# Patient Record
Sex: FEMALE | Race: WHITE | NOT HISPANIC OR LATINO | Employment: OTHER | URBAN - NONMETROPOLITAN AREA
[De-identification: names, ages, dates, MRNs, and addresses within clinical notes are randomized per-mention and may not be internally consistent; named-entity substitution may affect disease eponyms.]

---

## 2017-01-12 PROCEDURE — 88305 TISSUE EXAM BY PATHOLOGIST: CPT | Performed by: GENERAL PRACTICE

## 2017-01-13 ENCOUNTER — LAB REQUISITION (OUTPATIENT)
Dept: LAB | Facility: HOSPITAL | Age: 72
End: 2017-01-13

## 2017-01-13 DIAGNOSIS — Z00.00 ENCOUNTER FOR GENERAL ADULT MEDICAL EXAMINATION WITHOUT ABNORMAL FINDINGS: ICD-10-CM

## 2017-01-16 LAB
CYTO UR: NORMAL
LAB AP CASE REPORT: NORMAL
LAB AP CLINICAL INFORMATION: NORMAL
Lab: NORMAL
PATH REPORT.FINAL DX SPEC: NORMAL
PATH REPORT.GROSS SPEC: NORMAL

## 2018-01-23 ENCOUNTER — OFFICE VISIT (OUTPATIENT)
Dept: GASTROENTEROLOGY | Facility: CLINIC | Age: 73
End: 2018-01-23

## 2018-01-23 VITALS
WEIGHT: 202 LBS | HEART RATE: 82 BPM | DIASTOLIC BLOOD PRESSURE: 78 MMHG | BODY MASS INDEX: 33.66 KG/M2 | SYSTOLIC BLOOD PRESSURE: 172 MMHG | HEIGHT: 65 IN | OXYGEN SATURATION: 98 % | TEMPERATURE: 97.7 F

## 2018-01-23 DIAGNOSIS — Z86.010 HX OF COLONIC POLYP: Primary | ICD-10-CM

## 2018-01-23 PROCEDURE — S0260 H&P FOR SURGERY: HCPCS | Performed by: NURSE PRACTITIONER

## 2018-01-23 RX ORDER — OMEPRAZOLE 20 MG/1
20 CAPSULE, DELAYED RELEASE ORAL AS NEEDED
COMMUNITY

## 2018-01-23 RX ORDER — LOSARTAN POTASSIUM AND HYDROCHLOROTHIAZIDE 12.5; 5 MG/1; MG/1
TABLET ORAL
Refills: 3 | Status: ON HOLD | COMMUNITY
Start: 2017-10-17 | End: 2019-11-14

## 2018-01-23 RX ORDER — LORAZEPAM 0.5 MG/1
TABLET ORAL
Refills: 0 | Status: ON HOLD | COMMUNITY
Start: 2017-11-06 | End: 2019-11-14

## 2018-01-23 RX ORDER — INFLUENZA VACCINE, ADJUVANTED 15; 15; 15 UG/.5ML; UG/.5ML; UG/.5ML
INJECTION, SUSPENSION INTRAMUSCULAR
Refills: 0 | COMMUNITY
Start: 2017-10-17 | End: 2018-01-23

## 2018-01-23 RX ORDER — FERROUS SULFATE 325(65) MG
325 TABLET ORAL
COMMUNITY
End: 2021-08-19

## 2018-01-23 RX ORDER — ASPIRIN 81 MG/1
81 TABLET ORAL DAILY
COMMUNITY

## 2018-02-16 ENCOUNTER — TELEPHONE (OUTPATIENT)
Dept: FAMILY MEDICINE CLINIC | Facility: CLINIC | Age: 73
End: 2018-02-16

## 2018-02-16 NOTE — TELEPHONE ENCOUNTER
No she is not I did not even look in chart I just fig since kids were then she was ours I will have her call her dr for meds

## 2018-02-16 NOTE — TELEPHONE ENCOUNTER
Pt called she has 99.0 temp and just bad headache just she thinks she is gettingthe flu she asked for tamiflu wg

## 2019-03-19 ENCOUNTER — OFFICE VISIT (OUTPATIENT)
Dept: GASTROENTEROLOGY | Facility: CLINIC | Age: 74
End: 2019-03-19

## 2019-03-19 VITALS
SYSTOLIC BLOOD PRESSURE: 164 MMHG | WEIGHT: 205 LBS | TEMPERATURE: 97.8 F | BODY MASS INDEX: 34.16 KG/M2 | DIASTOLIC BLOOD PRESSURE: 78 MMHG | HEIGHT: 65 IN | OXYGEN SATURATION: 99 % | HEART RATE: 61 BPM

## 2019-03-19 DIAGNOSIS — Z86.010 HX OF COLONIC POLYP: Primary | ICD-10-CM

## 2019-03-19 DIAGNOSIS — I10 ESSENTIAL HYPERTENSION: ICD-10-CM

## 2019-03-19 PROCEDURE — S0260 H&P FOR SURGERY: HCPCS | Performed by: NURSE PRACTITIONER

## 2019-03-19 NOTE — PROGRESS NOTES
[facility]    Primary Physician Billy Leal MD    3/19/2019    Pascale Jimenez   1945      Chief Complaint   Patient presents with   • Colonoscopy       Subjective     HPI    Pascale Jimenez is a 74 y.o. female who presents as a referral for preventative maintenance. She has no complaints of nausea or vomiting. No change in bowels. No wt loss. No BRBPR. No melena. No abdominal pain.        Last colonoscopy was 3/2014 polyp ( polypoid appearing granulation tissue, no malignancy), sigmoid diverticulosis, .  The patient does have history of colon polyps. The patient does not have history of colon cancer.   There is no family history of colon polyps. There is no family history of colon cancer.     Past Medical History:   Diagnosis Date   • Colon polyp    • Diverticulitis    • Duodenal ulcer    • GERD (gastroesophageal reflux disease)    • Hemorrhoids    • Histoplasmosis    • Hyperlipidemia    • Hypertension        Past Surgical History:   Procedure Laterality Date   • APPENDECTOMY     • CHOLECYSTECTOMY     • COLONOSCOPY  03/25/2014   • ENDOSCOPY  07/14/2015   • HYSTERECTOMY     • REPLACEMENT TOTAL KNEE     • SPINE SURGERY     • TOTAL HIP ARTHROPLASTY Bilateral     x 2   • TUBAL ABDOMINAL LIGATION         Outpatient Medications Marked as Taking for the 3/19/19 encounter (Office Visit) with Namrata Quiros APRN   Medication Sig Dispense Refill   • aspirin 81 MG EC tablet Take 81 mg by mouth Daily.     • ferrous sulfate 325 (65 FE) MG tablet Take 325 mg by mouth Daily With Breakfast.     • FLUTICASONE PROPIONATE, INHAL, IN Inhale Daily.     • LORazepam (ATIVAN) 0.5 MG tablet TK 1 T PO TID PRA. NO DRIVING IF DROWSY PRN  0   • losartan-hydrochlorothiazide (HYZAAR) 50-12.5 MG per tablet TK 1 T PO D  3   • omeprazole (priLOSEC) 20 MG capsule Take 20 mg by mouth Daily.         Allergies   Allergen Reactions   • Demerol [Meperidine] Nausea And Vomiting   • Morphine And Related Nausea  And Vomiting   • Prednisone Other (See Comments)     Pt does not recall   • Stadol [Butorphanol] Other (See Comments)     Pt does not recall   • Talwin [Pentazocine] Mental Status Change   • Adhesive Tape Rash       Social History     Socioeconomic History   • Marital status:      Spouse name: Not on file   • Number of children: Not on file   • Years of education: Not on file   • Highest education level: Not on file   Social Needs   • Financial resource strain: Not on file   • Food insecurity - worry: Not on file   • Food insecurity - inability: Not on file   • Transportation needs - medical: Not on file   • Transportation needs - non-medical: Not on file   Occupational History   • Not on file   Tobacco Use   • Smoking status: Former Smoker   • Smokeless tobacco: Never Used   Substance and Sexual Activity   • Alcohol use: No   • Drug use: No   • Sexual activity: Defer   Other Topics Concern   • Not on file   Social History Narrative   • Not on file       Family History   Problem Relation Age of Onset   • Colon cancer Neg Hx    • Colon polyps Neg Hx        Review of Systems   Constitutional: Negative for chills, fever and unexpected weight change.   Respiratory: Negative for cough, shortness of breath and wheezing.    Cardiovascular: Negative for chest pain and palpitations.   Gastrointestinal: Negative for abdominal distention, abdominal pain, anal bleeding, blood in stool, constipation, diarrhea, nausea, rectal pain and vomiting.       Objective     Vitals:    03/19/19 1419   BP: 164/78   Pulse: 61   Temp: 97.8 °F (36.6 °C)   SpO2: 99%         03/19/19  1419   Weight: 93 kg (205 lb)     Body mass index is 34.64 kg/m².    Physical Exam   Constitutional: She appears well-developed and well-nourished. No distress.   Cardiovascular: Normal rate, regular rhythm and normal heart sounds.   Pulmonary/Chest: Effort normal and breath sounds normal.   Abdominal: Soft. Bowel sounds are normal. She exhibits no  distension. There is no tenderness.   Musculoskeletal: She exhibits no edema.   Neurological: She is alert.   Skin: Skin is warm and dry.   Psychiatric: She has a normal mood and affect. Her behavior is normal.   Vitals reviewed.      Imaging Results (most recent)     None          Assessment/Plan     Pascale was seen today for colonoscopy.    Diagnoses and all orders for this visit:    Hx of colonic polyp  -     External Facility Surgical/Procedural Request; Future    Essential hypertension    Other orders  -     polyethylene glycol (GOLYTELY) 236 g solution; Take 4,000 mL by mouth 1 (One) Time for 1 dose. Take as directed per instruction sheet.    Plan for colonoscopy.  The patient was advised to take any blood pressure or heart  medications the morning of  procedure if that is when he/she normally takes.             Body mass index is 34.64 kg/m².    Patient's Body mass index is 34.64 kg/m². BMI is above normal parameters. Recommendations include: no follow up, recommend weight loss. .      * Surgery not found *  All risks, benefits, alternatives, and indications of colonoscopy procedure have been discussed with the patient. Risks to include perforation of the colon requiring possible surgery or colostomy, risk of bleeding from biopsies or removal of colon tissue, possibility of missing a colon polyp or cancer, or adverse drug reaction.  Benefits to include the diagnosis and management of disease of the colon and rectum. Alternatives to include barium enema, radiographic evaluation, lab testing or no intervention. Pt verbalizes understanding and agrees.       JUAN Persaud      EMR Dragon/transcription disclaimer:  Much of this encounter note is electronic transcription/translation of spoken language to printed text.  The electronic translation of spoken language may be erroneous, or at times, nonsensical words or phrases may be inadvertently transcribed.  Although I have reviewed the note for such errors,  some may still exist.

## 2019-04-15 ENCOUNTER — TELEPHONE (OUTPATIENT)
Dept: INTERNAL MEDICINE | Facility: CLINIC | Age: 74
End: 2019-04-15

## 2019-04-15 NOTE — TELEPHONE ENCOUNTER
Pt never seen Emma Deimm or anyone else in our clinic, called pt to confirm and she said that it is indeed a mistake. Wrong pt.

## 2019-04-15 NOTE — TELEPHONE ENCOUNTER
PATIENT CALLED IN WANTING TO REFILL A RX FOR HER EYE. SHE STATED SHE WAS ATTACKED BY A [employer]IG WITH LIME DISEASE, SHE STATED SHE HAS A BULLS EYES AND HAS BEEN SEEN FOR THIS BEFORE. CALL WAS LOST

## 2019-04-17 ENCOUNTER — TELEPHONE (OUTPATIENT)
Dept: GASTROENTEROLOGY | Facility: CLINIC | Age: 74
End: 2019-04-17

## 2019-04-17 NOTE — TELEPHONE ENCOUNTER
PT called and would like to do the mag citrate instead of the Golytely.  Pt said she discussed it with you.

## 2019-04-23 ENCOUNTER — OUTSIDE FACILITY SERVICE (OUTPATIENT)
Dept: GASTROENTEROLOGY | Facility: CLINIC | Age: 74
End: 2019-04-23

## 2019-04-23 PROCEDURE — 45378 DIAGNOSTIC COLONOSCOPY: CPT | Performed by: INTERNAL MEDICINE

## 2019-04-30 PROCEDURE — G0123 SCREEN CERV/VAG THIN LAYER: HCPCS | Performed by: GENERAL PRACTICE

## 2019-05-02 ENCOUNTER — LAB REQUISITION (OUTPATIENT)
Dept: LAB | Facility: HOSPITAL | Age: 74
End: 2019-05-02

## 2019-05-02 DIAGNOSIS — Z01.419 ENCOUNTER FOR GYNECOLOGICAL EXAMINATION WITHOUT ABNORMAL FINDING: ICD-10-CM

## 2019-05-02 LAB
GEN CATEG CVX/VAG CYTO-IMP: NORMAL
LAB AP CASE REPORT: NORMAL
LAB AP GYN ADDITIONAL INFORMATION: NORMAL
LAB AP GYN OTHER FINDINGS: NORMAL
PATH INTERP SPEC-IMP: NORMAL
STAT OF ADQ CVX/VAG CYTO-IMP: NORMAL

## 2019-11-13 ENCOUNTER — HOSPITAL ENCOUNTER (OUTPATIENT)
Facility: HOSPITAL | Age: 74
Setting detail: OBSERVATION
Discharge: HOME | End: 2019-11-15
Attending: NEUROLOGICAL SURGERY | Admitting: NEUROLOGICAL SURGERY

## 2019-11-13 DIAGNOSIS — R26.89 DECREASED MOBILITY: ICD-10-CM

## 2019-11-13 DIAGNOSIS — S22.000A COMPRESSION FRACTURE OF BODY OF THORACIC VERTEBRA (HCC): Primary | ICD-10-CM

## 2019-11-13 LAB
ALBUMIN SERPL-MCNC: 4 G/DL (ref 3.5–5.2)
ALBUMIN/GLOB SERPL: 1.4 G/DL
ALP SERPL-CCNC: 90 U/L (ref 39–117)
ALT SERPL W P-5'-P-CCNC: 12 U/L (ref 1–33)
ANION GAP SERPL CALCULATED.3IONS-SCNC: 13 MMOL/L (ref 5–15)
APTT PPP: 31.1 SECONDS (ref 24.1–35)
AST SERPL-CCNC: 17 U/L (ref 1–32)
BASOPHILS # BLD AUTO: 0.04 10*3/MM3 (ref 0–0.2)
BASOPHILS NFR BLD AUTO: 0.4 % (ref 0–1.5)
BILIRUB SERPL-MCNC: 0.5 MG/DL (ref 0.2–1.2)
BUN BLD-MCNC: 17 MG/DL (ref 8–23)
BUN/CREAT SERPL: 30.9 (ref 7–25)
CALCIUM SPEC-SCNC: 9.4 MG/DL (ref 8.6–10.5)
CHLORIDE SERPL-SCNC: 98 MMOL/L (ref 98–107)
CO2 SERPL-SCNC: 30 MMOL/L (ref 22–29)
CREAT BLD-MCNC: 0.55 MG/DL (ref 0.57–1)
DEPRECATED RDW RBC AUTO: 42.7 FL (ref 37–54)
EOSINOPHIL # BLD AUTO: 0.17 10*3/MM3 (ref 0–0.4)
EOSINOPHIL NFR BLD AUTO: 1.5 % (ref 0.3–6.2)
ERYTHROCYTE [DISTWIDTH] IN BLOOD BY AUTOMATED COUNT: 13.1 % (ref 12.3–15.4)
GFR SERPL CREATININE-BSD FRML MDRD: 108 ML/MIN/1.73
GLOBULIN UR ELPH-MCNC: 2.9 GM/DL
GLUCOSE BLD-MCNC: 154 MG/DL (ref 65–99)
HCT VFR BLD AUTO: 34.7 % (ref 34–46.6)
HGB BLD-MCNC: 11.8 G/DL (ref 12–15.9)
IMM GRANULOCYTES # BLD AUTO: 0.06 10*3/MM3 (ref 0–0.05)
IMM GRANULOCYTES NFR BLD AUTO: 0.5 % (ref 0–0.5)
INR PPP: 1 (ref 0.91–1.09)
LYMPHOCYTES # BLD AUTO: 1.56 10*3/MM3 (ref 0.7–3.1)
LYMPHOCYTES NFR BLD AUTO: 13.8 % (ref 19.6–45.3)
MCH RBC QN AUTO: 30.6 PG (ref 26.6–33)
MCHC RBC AUTO-ENTMCNC: 34 G/DL (ref 31.5–35.7)
MCV RBC AUTO: 90.1 FL (ref 79–97)
MONOCYTES # BLD AUTO: 0.74 10*3/MM3 (ref 0.1–0.9)
MONOCYTES NFR BLD AUTO: 6.6 % (ref 5–12)
NEUTROPHILS # BLD AUTO: 8.72 10*3/MM3 (ref 1.7–7)
NEUTROPHILS NFR BLD AUTO: 77.2 % (ref 42.7–76)
NRBC BLD AUTO-RTO: 0 /100 WBC (ref 0–0.2)
PLATELET # BLD AUTO: 344 10*3/MM3 (ref 140–450)
PMV BLD AUTO: 9.7 FL (ref 6–12)
POTASSIUM BLD-SCNC: 3.9 MMOL/L (ref 3.5–5.2)
PROT SERPL-MCNC: 6.9 G/DL (ref 6–8.5)
PROTHROMBIN TIME: 13.5 SECONDS (ref 11.9–14.6)
RBC # BLD AUTO: 3.85 10*6/MM3 (ref 3.77–5.28)
SODIUM BLD-SCNC: 141 MMOL/L (ref 136–145)
WBC NRBC COR # BLD: 11.29 10*3/MM3 (ref 3.4–10.8)

## 2019-11-13 PROCEDURE — 96374 THER/PROPH/DIAG INJ IV PUSH: CPT

## 2019-11-13 PROCEDURE — G0378 HOSPITAL OBSERVATION PER HR: HCPCS

## 2019-11-13 PROCEDURE — 80053 COMPREHEN METABOLIC PANEL: CPT | Performed by: NEUROLOGICAL SURGERY

## 2019-11-13 PROCEDURE — 99219 PR INITIAL OBSERVATION CARE/DAY 50 MINUTES: CPT | Performed by: NEUROLOGICAL SURGERY

## 2019-11-13 PROCEDURE — 85730 THROMBOPLASTIN TIME PARTIAL: CPT | Performed by: NEUROLOGICAL SURGERY

## 2019-11-13 PROCEDURE — 85025 COMPLETE CBC W/AUTO DIFF WBC: CPT | Performed by: NEUROLOGICAL SURGERY

## 2019-11-13 PROCEDURE — 25010000002 KETOROLAC TROMETHAMINE PER 15 MG: Performed by: NEUROLOGICAL SURGERY

## 2019-11-13 PROCEDURE — 94799 UNLISTED PULMONARY SVC/PX: CPT

## 2019-11-13 PROCEDURE — 94760 N-INVAS EAR/PLS OXIMETRY 1: CPT

## 2019-11-13 PROCEDURE — 85610 PROTHROMBIN TIME: CPT | Performed by: NEUROLOGICAL SURGERY

## 2019-11-13 RX ORDER — UBIDECARENONE 75 MG
50 CAPSULE ORAL DAILY
COMMUNITY
End: 2021-01-18 | Stop reason: SDDI

## 2019-11-13 RX ORDER — FERROUS SULFATE 325(65) MG
325 TABLET ORAL
Status: DISCONTINUED | OUTPATIENT
Start: 2019-11-14 | End: 2019-11-15 | Stop reason: HOSPADM

## 2019-11-13 RX ORDER — LORAZEPAM 0.5 MG/1
0.5 TABLET ORAL EVERY 6 HOURS PRN
Status: DISCONTINUED | OUTPATIENT
Start: 2019-11-13 | End: 2019-11-15 | Stop reason: HOSPADM

## 2019-11-13 RX ORDER — PROMETHAZINE HYDROCHLORIDE 25 MG/ML
12.5 INJECTION, SOLUTION INTRAMUSCULAR; INTRAVENOUS EVERY 6 HOURS PRN
Status: DISCONTINUED | OUTPATIENT
Start: 2019-11-13 | End: 2019-11-15 | Stop reason: HOSPADM

## 2019-11-13 RX ORDER — PROMETHAZINE HYDROCHLORIDE 25 MG/1
12.5 TABLET ORAL EVERY 6 HOURS PRN
Status: DISCONTINUED | OUTPATIENT
Start: 2019-11-13 | End: 2019-11-15 | Stop reason: HOSPADM

## 2019-11-13 RX ORDER — SENNA AND DOCUSATE SODIUM 50; 8.6 MG/1; MG/1
2 TABLET, FILM COATED ORAL 2 TIMES DAILY PRN
Status: DISCONTINUED | OUTPATIENT
Start: 2019-11-13 | End: 2019-11-15 | Stop reason: HOSPADM

## 2019-11-13 RX ORDER — ASPIRIN 81 MG/1
81 TABLET ORAL DAILY
Status: DISCONTINUED | OUTPATIENT
Start: 2019-11-14 | End: 2019-11-15 | Stop reason: HOSPADM

## 2019-11-13 RX ORDER — BISACODYL 10 MG
10 SUPPOSITORY, RECTAL RECTAL DAILY PRN
Status: DISCONTINUED | OUTPATIENT
Start: 2019-11-13 | End: 2019-11-15 | Stop reason: HOSPADM

## 2019-11-13 RX ORDER — PANTOPRAZOLE SODIUM 40 MG/1
40 TABLET, DELAYED RELEASE ORAL
Status: DISCONTINUED | OUTPATIENT
Start: 2019-11-14 | End: 2019-11-15 | Stop reason: HOSPADM

## 2019-11-13 RX ORDER — PROMETHAZINE HYDROCHLORIDE 12.5 MG/1
12.5 SUPPOSITORY RECTAL EVERY 6 HOURS PRN
Status: DISCONTINUED | OUTPATIENT
Start: 2019-11-13 | End: 2019-11-15 | Stop reason: HOSPADM

## 2019-11-13 RX ORDER — SODIUM CHLORIDE 0.9 % (FLUSH) 0.9 %
10 SYRINGE (ML) INJECTION EVERY 12 HOURS SCHEDULED
Status: DISCONTINUED | OUTPATIENT
Start: 2019-11-13 | End: 2019-11-15 | Stop reason: HOSPADM

## 2019-11-13 RX ORDER — SODIUM CHLORIDE 0.9 % (FLUSH) 0.9 %
10 SYRINGE (ML) INJECTION AS NEEDED
Status: DISCONTINUED | OUTPATIENT
Start: 2019-11-13 | End: 2019-11-15 | Stop reason: HOSPADM

## 2019-11-13 RX ORDER — KETOROLAC TROMETHAMINE 30 MG/ML
30 INJECTION, SOLUTION INTRAMUSCULAR; INTRAVENOUS EVERY 6 HOURS PRN
Status: DISCONTINUED | OUTPATIENT
Start: 2019-11-13 | End: 2019-11-15 | Stop reason: HOSPADM

## 2019-11-13 RX ADMIN — SODIUM CHLORIDE, PRESERVATIVE FREE 10 ML: 5 INJECTION INTRAVENOUS at 23:08

## 2019-11-13 RX ADMIN — KETOROLAC TROMETHAMINE 30 MG: 30 INJECTION, SOLUTION INTRAMUSCULAR; INTRAVENOUS at 23:07

## 2019-11-13 RX ADMIN — LORAZEPAM 0.5 MG: 0.5 TABLET ORAL at 23:07

## 2019-11-14 ENCOUNTER — APPOINTMENT (OUTPATIENT)
Dept: MRI IMAGING | Facility: HOSPITAL | Age: 74
End: 2019-11-14

## 2019-11-14 ENCOUNTER — APPOINTMENT (OUTPATIENT)
Dept: CT IMAGING | Facility: HOSPITAL | Age: 74
End: 2019-11-14

## 2019-11-14 PROCEDURE — 25010000002 KETOROLAC TROMETHAMINE PER 15 MG: Performed by: NEUROLOGICAL SURGERY

## 2019-11-14 PROCEDURE — 96376 TX/PRO/DX INJ SAME DRUG ADON: CPT

## 2019-11-14 PROCEDURE — 94799 UNLISTED PULMONARY SVC/PX: CPT

## 2019-11-14 PROCEDURE — 72146 MRI CHEST SPINE W/O DYE: CPT

## 2019-11-14 PROCEDURE — 99225 PR SBSQ OBSERVATION CARE/DAY 25 MINUTES: CPT | Performed by: NURSE PRACTITIONER

## 2019-11-14 PROCEDURE — G0378 HOSPITAL OBSERVATION PER HR: HCPCS

## 2019-11-14 PROCEDURE — 94760 N-INVAS EAR/PLS OXIMETRY 1: CPT

## 2019-11-14 PROCEDURE — 71250 CT THORAX DX C-: CPT

## 2019-11-14 PROCEDURE — 97161 PT EVAL LOW COMPLEX 20 MIN: CPT

## 2019-11-14 PROCEDURE — 97165 OT EVAL LOW COMPLEX 30 MIN: CPT

## 2019-11-14 RX ORDER — HYDROCHLOROTHIAZIDE 12.5 MG/1
12.5 TABLET ORAL DAILY
COMMUNITY

## 2019-11-14 RX ORDER — LOSARTAN POTASSIUM 100 MG/1
100 TABLET ORAL DAILY
COMMUNITY

## 2019-11-14 RX ADMIN — LORAZEPAM 0.5 MG: 0.5 TABLET ORAL at 21:58

## 2019-11-14 RX ADMIN — PANTOPRAZOLE SODIUM 40 MG: 40 TABLET, DELAYED RELEASE ORAL at 05:12

## 2019-11-14 RX ADMIN — KETOROLAC TROMETHAMINE 30 MG: 30 INJECTION, SOLUTION INTRAMUSCULAR; INTRAVENOUS at 21:58

## 2019-11-14 RX ADMIN — SODIUM CHLORIDE, PRESERVATIVE FREE 10 ML: 5 INJECTION INTRAVENOUS at 10:11

## 2019-11-14 RX ADMIN — LORAZEPAM 0.5 MG: 0.5 TABLET ORAL at 16:03

## 2019-11-14 NOTE — THERAPY EVALUATION
Patient Name: Pascale Jimenez  : 1945    MRN: [id number]                              Today's Date: 2019       Admit Date: 2019    Visit Dx:     ICD-10-CM ICD-9-CM   1. Decreased mobility R26.89 781.99     Patient Active Problem List   Diagnosis   • Compression fracture of body of thoracic vertebra (CMS/HCC)     Past Medical History:   Diagnosis Date   • Colon polyp    • Diverticulitis    • Duodenal ulcer    • GERD (gastroesophageal reflux disease)    • Hemorrhoids    • Histoplasmosis    • Hyperlipidemia    • Hypertension      Past Surgical History:   Procedure Laterality Date   • APPENDECTOMY     • CHOLECYSTECTOMY     • COLONOSCOPY  2014   • ENDOSCOPY  2015   • HYSTERECTOMY     • REPLACEMENT TOTAL KNEE     • SPINE SURGERY     • TOTAL HIP ARTHROPLASTY Bilateral     x 2   • TUBAL ABDOMINAL LIGATION       General Information     Row Name 19 1128          PT Evaluation Time/Intention    Document Type  evaluation pt presents with possible acute T7 compression Fx, pt reports L paraspinal back pain and midthoracic pain  -MS (r) DE (t) MS (c)     Mode of Treatment  physical therapy  -MS (r) DE (t) MS (c)     Row Name 19 1128          General Information    Patient Profile Reviewed?  yes  -MS (r) DE (t) MS (c)     Prior Level of Function  independent:;all household mobility;community mobility;gait;transfer;ADL's;grooming;feeding;cooking;home management;dressing;bathing  -MS (r) DE (t) MS (c)     Existing Precautions/Restrictions  fall;spinal  -MS (r) DE (t) MS (c)     Barriers to Rehab  none identified  -MS (r) DE (t) MS (c)     Row Name 19 1128          Relationship/Environment    Lives With  grandchild(jeanie)  -MS (r) DE (t) MS (c)     Name(s) of Who Lives With Patient  cares for 2 grandchildren  -MS (r) DE (t) MS (c)     Row Name 19 1128          Resource/Environmental Concerns    Current Living Arrangements  home/apartment/condo  -MS (r) DE (t) MS (c)     Row Name  11/14/19 1128          Home Main Entrance    Number of Stairs, Main Entrance  eight  -MS (r) DE (t) MS (c)     Stair Railings, Main Entrance  railings on both sides of stairs  -MS (r) DE (t) MS (c)     Row Name 11/14/19 1128          Cognitive Assessment/Intervention- PT/OT    Orientation Status (Cognition)  oriented x 4  -MS (r) DE (t) MS (c)     Row Name 11/14/19 1128          Safety Issues, Functional Mobility    Impairments Affecting Function (Mobility)  pain  -MS (r) DE (t) MS (c)       User Key  (r) = Recorded By, (t) = Taken By, (c) = Cosigned By    Initials Name Provider Type    Marilu Loza, PT, DPT, NCS Physical Therapist    Abiodun Milan, PT Student PT Student        Mobility     Row Name 11/14/19 1130          Bed Mobility Assessment/Treatment    Bed Mobility Assessment/Treatment  supine-sit;scooting/bridging;sit-supine  -MS (r) DE (t) MS (c)     Scooting/Bridging [place] (Bed Mobility)  conditional independence  -MS (r) DE (t) MS (c)     Supine-Sit [place] (Bed Mobility)  conditional independence  -MS (r) DE (t) MS (c)     Sit-Supine [place] (Bed Mobility)  conditional independence  -MS (r) DE (t) MS (c)     Assistive Device (Bed Mobility)  head of bed elevated  -MS (r) DE (t) MS (c)     Row Name 11/14/19 1130          Sit-Stand Transfer    Sit-Stand [place] (Transfers)  independent  -MS (r) DE (t) MS (c)     Row Name 11/14/19 1130          Gait/Stairs Assessment/Training    Gait/Stairs Assessment/Training  gait/ambulation independence  -MS (r) DE (t) MS (c)     [place] Level (Gait)  independent  -MS (r) DE (t) MS (c)     Distance in Feet (Gait)  200 feet.  -MS (r) DE (t) MS (c)     Negotiation (Stairs)  stairs independence  -MS (r) DE (t) MS (c)     [place] Level (Stairs)  independent  -MS (r) DE (t) MS (c)     Handrail Location (Stairs)  both sides  -MS (r) DE (t) MS (c)     Number of Steps (Stairs)  5 stairs   -MS (r) DE (t) MS (c)       User Key  (r) =  Recorded By, (t) = Taken By, (c) = Cosigned By    Initials Name Provider Type    Marilu Loza R, PT, DPT, NCS Physical Therapist    Abiodun Milan, PT Student PT Student        Obj/Interventions     Row Name 11/14/19 1132          General ROM    GENERAL ROM COMMENTS  BUE and BLE WNL  -MS (r) DE (t) MS (c)     Row Name 11/14/19 1132          MMT (Manual Muscle Testing)    General MMT Comments  BUE and BLE WNL  -MS (r) DE (t) MS (c)     Row Name 11/14/19 1132          Static Sitting Balance    Level of Coffee (Unsupported Sitting, Static Balance)  independent  -MS (r) DE (t) MS (c)     Sitting Position (Unsupported Sitting, Static Balance)  sitting on edge of bed  -MS (r) DE (t) MS (c)     Row Name 11/14/19 1132          Dynamic Sitting Balance    Level of [place], Reaches Outside Midline (Sitting, Dynamic Balance)  independent  -MS (r) DE (t) MS (c)     Sitting Position, Reaches Outside Midline (Sitting, Dynamic Balance)  sitting on edge of bed  -MS (r) DE (t) MS (c)     Row Name 11/14/19 1132          Static Standing Balance    Level of Coffee (Supported Standing, Static Balance)  independent  -MS (r) DE (t) MS (c)     Row Name 11/14/19 1132          Dynamic Standing Balance    Level of Coffee, Reaches Outside Midline (Standing, Dynamic Balance)  supervision  -MS (r) DE (t) MS (c)     Row Name 11/14/19 1132          Sensory Assessment/Intervention    Sensory General Assessment  no sensation deficits identified  -MS (r) DE (t) MS (c)       User Key  (r) = Recorded By, (t) = Taken By, (c) = Cosigned By    Initials Name Provider Type    Marilu Loaz R, PT, DPT, NCS Physical Therapist    Abiodun Milan, PT Student PT Student        Goals/Plan    No documentation.       Clinical Impression     Row Name 11/14/19 1133          Pain Assessment    Additional Documentation  Pain Scale: Numbers Pre/Post-Treatment (Group)  -MS (r) DE (t) MS (c)     Row Name 11/14/19 1133          Pain  Scale: Numbers Pre/Post-Treatment    Pain Scale: Numbers, Pretreatment  2/10  -MS (r) DE (t) MS (c)     Pain Location - Side  Left  -MS (r) DE (t) MS (c)     Pain Location - Orientation  posterior  -MS (r) DE (t) MS (c)     Pain Location  back  -MS (r) DE (t) MS (c)     Pre/Post Treatment Pain Comment  pt also states her buttocks are a little sore from fall.  -MS (r) DE (t) MS (c)     Pain Intervention(s)  Ambulation/increased activity;Repositioned  -MS (r) DE (t) MS (c)     Row Name 11/14/19 1133          Plan of Care Review    Plan of Care Reviewed With  patient  -MS (r) DE (t) MS (c)     Row Name 11/14/19 1133          Physical Therapy Clinical Impression    Patient/Family Goals Statement (PT Clinical Impression)  to be able to return home safely  -MS (r) DE (t) MS (c)     Criteria for Skilled Interventions Met (PT Clinical Impression)  no;current level of function same as previous level of function;no problems identified which require skilled intervention  -MS (r) DE (t) MS (c)     Row Name 11/14/19 1133          Positioning and Restraints    Pre-Treatment Position  in bed  -MS (r) DE (t) MS (c)     Post Treatment Position  bed  -MS (r) DE (t) MS (c)     In Bed  fowlers;encouraged to call for assist;call light within reach;side rails up x2;SCD pump applied  -MS (r) DE (t) MS (c)       User Key  (r) = Recorded By, (t) = Taken By, (c) = Cosigned By    Initials Name Provider Type    Marilu Loza, PT, DPT, NCS Physical Therapist    Abiodun Milan, PT Student PT Student        Outcome Measures     Row Name 11/14/19 4769          How much help from another person do you currently need...    Turning from your back to your side while in flat bed without using bedrails?  4  -MS (r) DE (t) MS (c)     Moving from lying on back to sitting on the side of a flat bed without bedrails?  4  -MS (r) DE (t) MS (c)     Moving to and from a bed to a chair (including a wheelchair)?  4  -MS (r) DE (t) MS (c)     Standing up  "from a chair using your arms (e.g., wheelchair, bedside chair)?  4  -MS (r) DE (t) MS (c)     Climbing 3-5 steps with a railing?  4  -MS (r) DE (t) MS (c)     To walk in hospital room?  4  -MS (r) DE (t) MS (c)     AM-PAC 6 Clicks Score (PT)  24  -MS (r) DE (t)     Row Name 11/14/19 1135          Functional Assessment    Outcome Measure Options  AM-PAC 6 Clicks Basic Mobility (PT)  -MS (r) DE (t) MS (c)       User Key  (r) = Recorded By, (t) = Taken By, (c) = Cosigned By    Initials Name Provider Type    Marilu Loza FATOU, PT, DPT, NCS Physical Therapist    Abiodun Milan, PT Student PT Student        Physical Therapy Education     Title: PT OT SLP Therapies (Done)     Topic: Physical Therapy (Done)     Point: Body mechanics (Done)     Learning Progress Summary           Patient BELLA Manuel COURTNEY CHRISTIANSON by DE at 11/14/2019 11:36 AM    Comment:  pt educated on current precautions and strategies to adhere to them. pt given tips for proper body mechanics during functional task                   Point: Precautions (Done)     Learning Progress Summary           Patient BELLA Manuel VU, DU by DE at 11/14/2019 11:36 AM    Comment:  pt educated on current precautions and strategies to adhere to them. pt given tips for proper body mechanics during functional task                               User Key     Initials Effective Dates Name Provider Type Discipline    DE 10/10/19 -  Abiodun Tolentino, PT Student PT Student PT              PT Recommendation and Plan     Outcome Summary/Treatment Plan (PT)  Anticipated Discharge Disposition (PT): home with assist  Plan of Care Reviewed With: patient  Progress: improving  Outcome Summary: PT eval completed on this date. pt was A&O x4 and very cooperative. pt reported a minor amount of pain in her midback. pt states she has been getting around fine the past 3 days just has had a little pain and she came to the hospital to get answers on what injury she obtained and make sure \"she wouldnt be " "paralyzed\" from it. pt moved in and out of bed, performed functional transfers, ambulated approx 200 feet and used 5 stairs, all with independence. other than noted pain, pt is functioning at her baseline therefore PT intervention is not indicated at this time. Anticipated D/C to home with assist from family, when medically ready.        Time Calculation:   PT Charges     Row Name 11/14/19 1127             Time Calculation    Start Time  1108 chart review [id number]  -MS (r) DE (t) MS (c)      Stop Time  1126  -MS (r) DE (t) MS (c)      Time Calculation (min)  18 min  -MS (r) DE (t)      PT Received On  11/14/19  -MS (r) DE (t) MS (c)      PT Goal Re-Cert Due Date  --  -MS (r) DE (t) MS (c)        User Key  (r) = Recorded By, (t) = Taken By, (c) = Cosigned By    Initials Name Provider Type    MS Salbador Marilu R, PT, DPT, NCS Physical Therapist    Abiodun Milan, PT Student PT Student        Therapy Charges for Today     Code Description Service Date Service Provider Modifiers Qty    [id number] HC PT EVAL LOW COMPLEXITY 2 11/14/2019 Abiodun Tolentino, PT Student GP 1          PT G-Codes  Outcome Measure Options: AM-PAC 6 Clicks Basic Mobility (PT)  AM-PAC 6 Clicks Score (PT): 24    Abiodun Tolentino PT Student  11/14/2019       "

## 2019-11-14 NOTE — PLAN OF CARE
"Problem: Patient Care Overview  Goal: Plan of Care Review   11/14/19 7239   Coping/Psychosocial   Plan of Care Reviewed With patient   Plan of Care Review   Progress improving   OTHER   Outcome Summary PT annabelle completed on this date. pt was A&O x4 and very cooperative. pt reported a minor amount of pain in her midback. pt states she has been getting around fine the past 3 days just has had a little pain and she came to the hospital to get answers on what injury she obtained and make sure \"she wouldnt be paralyzed\" from it. pt moved in and out of bed, performed functional transfers, ambulated approx 200 feet and used 5 stairs, all with independence. other than noted pain, pt is functioning at her baseline therefore PT intervention is not indicated at this time. Anticipated D/C to home with assist from family, when medically ready.          "

## 2019-11-14 NOTE — THERAPY DISCHARGE NOTE
Acute Care - Occupational Therapy Initial Eval/Discharge  [facility]     Patient Name: Pascale Jimenez  : 1945  MRN: [id number]  Today's Date: 2019  Onset of Illness/Injury or Date of Surgery: 19  Date of Referral to OT: 19  Referring Physician: Dr. Argueta      Admit Date: 2019       ICD-10-CM ICD-9-CM   1. Decreased mobility R26.89 781.99     Patient Active Problem List   Diagnosis   • Compression fracture of body of thoracic vertebra (CMS/HCC)     Past Medical History:   Diagnosis Date   • Colon polyp    • Diverticulitis    • Duodenal ulcer    • GERD (gastroesophageal reflux disease)    • Hemorrhoids    • Histoplasmosis    • Hyperlipidemia    • Hypertension      Past Surgical History:   Procedure Laterality Date   • APPENDECTOMY     • CHOLECYSTECTOMY     • COLONOSCOPY  2014   • ENDOSCOPY  2015   • HYSTERECTOMY     • REPLACEMENT TOTAL KNEE     • SPINE SURGERY     • TOTAL HIP ARTHROPLASTY Bilateral     x 2   • TUBAL ABDOMINAL LIGATION            OT ASSESSMENT FLOWSHEET (last 12 hours)      Occupational Therapy Evaluation     Row Name 19 1112                   OT Evaluation Time/Intention    Subjective Information  complains of;pain  -CS        Document Type  evaluation  -CS        Mode of Treatment  occupational therapy  -CS           General Information    Patient Profile Reviewed?  yes  -CS        Onset of Illness/Injury or Date of Surgery  19  -CS        Referring Physician  Dr. Argueta  -CS        Patient Observations  alert;cooperative;agree to therapy  -CS        Patient/Family Observations  no family present  -CS        General Observations of Patient  Pt up in bathroom independently  -CS        Prior Level of Function  independent:;all household mobility;community mobility;ADL's;dressing;bathing  -CS        Equipment Currently Used at Home  none  -CS        Pertinent History of Current Functional Problem  Pt presents with back pain after slipping on ice  on Monday 11/10.  Pt is experiencing L paraspinal back pain.  DX: T7 compression fx  -CS        Existing Precautions/Restrictions  fall;spinal  -CS        Risks Reviewed  patient:;LOB;nausea/vomiting;dizziness;increased discomfort  -CS        Benefits Reviewed  patient:;improve function;increase independence;increase strength;increase balance;decrease pain  -CS        Barriers to Rehab  none identified  -CS           Relationship/Environment    Lives With  grandchild(jeanie) dependent  -CS           Resource/Environmental Concerns    Current Living Arrangements  home/apartment/condo  -CS           Home Main Entrance    Number of Stairs, Main Entrance  eight  -CS        Stair Railings, Main Entrance  railings on both sides of stairs  -CS           Cognitive Assessment/Interventions    Additional Documentation  Cognitive Assessment/Intervention (Group)  -CS           Cognitive Assessment/Intervention- PT/OT    Affect/Mental Status (Cognitive)  WNL  -CS        Orientation Status (Cognition)  oriented x 4  -CS        Follows Commands (Cognition)  WNL  -CS        Cognitive Function (Cognitive)  WNL  -CS        Personal Safety Interventions  fall prevention program maintained;gait belt;nonskid shoes/slippers when out of bed;supervised activity  -CS           Safety Issues, Functional Mobility    Impairments Affecting Function (Mobility)  pain  -CS           Bed Mobility Assessment/Treatment    Bed Mobility Assessment/Treatment  sit-supine;scooting/bridging  -CS        Scooting/Bridging Kay (Bed Mobility)  independent  -CS        Sit-Supine Kay (Bed Mobility)  independent  -CS        Assistive Device (Bed Mobility)  head of bed elevated  -CS           Functional Mobility    Functional Mobility- Ind. Level  independent  -CS        Functional Mobility- Comment  Pt walked entire length of hallway  -CS           Transfer Assessment/Treatment    Transfer Assessment/Treatment  sit-stand transfer;stand-sit transfer   -CS           Sit-Stand Transfer    Sit-Stand [place] (Transfers)  independent  -CS           Stand-Sit Transfer    Stand-Sit [place] (Transfers)  independent  -CS           ADL Assessment/Intervention    BADL Assessment/Intervention  bathing;upper body dressing;lower body dressing  -CS           Bathing Assessment/Intervention    Comment (Bathing)  Pt completed independently prior to OT eval  -CS           Upper Body Dressing Assessment/Training    Comment (Upper Body Dressing)  Pt completed independently prior to OT eval  -CS           Lower Body Dressing Assessment/Training    Comment (Lower Body Dressing)  Pt completed independently prior to OT eval  -CS           General ROM    GENERAL ROM COMMENTS  BUE AROM WFL  -CS           MMT (Manual Muscle Testing)    General MMT Comments  BUE strength: 5/5  -CS           Sensory Assessment/Intervention    Sensory General Assessment  no sensation deficits identified  -CS           Positioning and Restraints    Pre-Treatment Position  bathroom  -CS        Post Treatment Position  bed  -CS        In Bed  fowlers;call light within reach;encouraged to call for assist  -CS           Pain Assessment    Additional Documentation  Pain Scale: Numbers Pre/Post-Treatment (Group)  -CS           Pain Scale: Numbers Pre/Post-Treatment    Pain Scale: Numbers, Pretreatment  2/10  -CS        Pain Location - Side  Bilateral  -CS        Pain Location - Orientation  posterior  -CS        Pain Location  back  -CS        Pain Intervention(s)  Medication (See MAR);Repositioned;Ambulation/increased activity  -CS           Plan of Care Review    Plan of Care Reviewed With  patient  -CS           Clinical Impression (OT)    Date of Referral to OT  11/13/19  -CS        OT Diagnosis  Impaired ADLs and funcitonal mobility  -CS        Patient/Family Goals Statement (OT Eval)  to go home  -CS        Criteria for Skilled Therapeutic Interventions Met (OT Eval)  no;no problems identified which  require skilled intervention  -CS        Therapy Frequency (OT Eval)  evaluation only  -CS        Care Plan Review (OT)  evaluation/treatment results reviewed  -CS        Anticipated Discharge Disposition (OT)  home with assist  -CS           Living Environment    Home Accessibility  stairs to enter home  -CS          User Key  (r) = Recorded By, (t) = Taken By, (c) = Cosigned By    Initials Name Effective Dates    Kesha Quinones OTR/LGONZALO 04/02/19 -               OT Recommendation and Plan  Outcome Summary/Treatment Plan (OT)  Anticipated Discharge Disposition (OT): home with assist  Therapy Frequency (OT Eval): evaluation only  Plan of Care Review  Plan of Care Reviewed With: patient  Plan of Care Reviewed With: patient  Outcome Summary: OT evaluation completed.  Pt demo no further need for skilled OT services, as pt is at her baseline.  Pt completed full shower and dressing task prior to therapy evaluation independently.  Pt is at her baseline with ADLs and completed all functional mobility with mod I.  OT will sign off at this time with recommendation for pt to discharge home with assist.         Outcome Measures     Row Name 11/14/19 1112             How much help from another is currently needed...    Putting on and taking off regular lower body clothing?  4  -CS      Bathing (including washing, rinsing, and drying)  4  -CS      Toileting (which includes using toilet bed pan or urinal)  4  -CS      Putting on and taking off regular upper body clothing  4  -CS      Taking care of personal grooming (such as brushing teeth)  4  -CS      Eating meals  4  -CS      AM-PAC 6 Clicks Score (OT)  24  -CS         Functional Assessment    Outcome Measure Options  AM-PAC 6 Clicks Daily Activity (OT)  -CS        User Key  (r) = Recorded By, (t) = Taken By, (c) = Cosigned By    Initials Name Provider Type    Kesha Quinones, OTR/L, GONZALO Occupational Therapist          Time Calculation:   Time Calculation- OT      Row Name 11/14/19 1346 11/14/19 1345          Time Calculation- OT    OT Start Time  1112  -CS  0740  -CS     OT Stop Time  1134  -CS  0800  -CS     OT Time Calculation (min)  22 min  -CS  20 min  -CS     OT Received On  11/14/19  -CS  11/14/19  -CS       User Key  (r) = Recorded By, (t) = Taken By, (c) = Cosigned By    Initials Name Provider Type    CS Kesha Knutson OTR/L, GONZALO Occupational Therapist        Therapy Suggested Charges     Code   Minutes Charges    None           Therapy Charges for Today     Code Description Service Date Service Provider Modifiers Qty    [id number] HC OT EVAL LOW COMPLEXITY 3 11/14/2019 Kesah Knutson OTR/L, CNT GO 1               OT Discharge Summary  Anticipated Discharge Disposition (OT): home with assist  Reason for Discharge: At baseline function, Independent  Outcomes Achieved: Other(1x OT evaluation)  Discharge Destination: Home with assist    MARYCRUZ Guardado/L, CNT  11/14/2019

## 2019-11-14 NOTE — PROGRESS NOTES
"Pascale Jimenez  74 y.o.      Chief complaint:   Thoracic back pain.  CT scan of the chest did not show a rib fracture.  There is concern that the T7 compression deformity may be acute.  Pain controlled with IV Toradol    Subjective:  Symptoms:  Stable.    Diet:  NPO.    Activity level: Impaired due to pain.    Pain:  She complains of pain that is moderate.  She reports pain is improving.  Pain is partially controlled.      No events    Temp:  [97.6 °F (36.4 °C)-98.4 °F (36.9 °C)] 98.4 °F (36.9 °C)  Heart Rate:  [75-80] 80  Resp:  [18-19] 18  BP: (156-189)/(50-75) 156/50      Objective:  General Appearance:  Well-appearing, comfortable, in no acute distress and in pain.    Vital signs: (most recent): Blood pressure 156/50, pulse 80, temperature 98.4 °F (36.9 °C), temperature source Oral, resp. rate 18, height 167.6 cm (66\"), weight 96.2 kg (212 lb 0 oz), SpO2 96 %.  Vital signs are normal.  No fever.    Output: Producing urine.    HEENT: Normal HEENT exam.    Lungs:  Normal effort and normal respiratory rate.  She is not in respiratory distress.    Heart: Normal rate.  Regular rhythm.    Chest: Symmetric chest wall expansion.   Extremities: Normal range of motion.    Skin:  Warm and dry.    Abdomen: Bowel sounds are normal.   There is no abdominal tenderness.     Pulses: Distal pulses are intact.        Neurologic Exam     Mental Status   Oriented to person, place, and time.   Attention: normal. Concentration: normal.   Speech: speech is normal   Level of consciousness: alert  Normal comprehension.     Cranial Nerves   Cranial nerves II through XII intact.     Motor Exam   Muscle bulk: normal    Strength   Strength 5/5 throughout.     Sensory Exam   Light touch normal.         Compression fracture of body of thoracic vertebra (CMS/HCC)      Lab Results (last 24 hours)     Procedure Component Value Units Date/Time    Comprehensive Metabolic Panel [[id number]]  (Abnormal) Collected:  11/13/19 2206    Specimen:  Blood " Updated:  11/13/19 2338     Glucose 154 mg/dL      BUN 17 mg/dL      Creatinine 0.55 mg/dL      Sodium 141 mmol/L      Potassium 3.9 mmol/L      Chloride 98 mmol/L      CO2 30.0 mmol/L      Calcium 9.4 mg/dL      Total Protein 6.9 g/dL      Albumin 4.00 g/dL      ALT (SGPT) 12 U/L      AST (SGOT) 17 U/L      Alkaline Phosphatase 90 U/L      Total Bilirubin 0.5 mg/dL      eGFR Non African Amer 108 mL/min/1.73      Globulin 2.9 gm/dL      A/G Ratio 1.4 g/dL      BUN/Creatinine Ratio 30.9     Anion Gap 13.0 mmol/L     Narrative:       GFR Normal >60  Chronic Kidney Disease <60  Kidney Failure <15    aPTT [[id number]]  (Normal) Collected:  11/13/19 2314    Specimen:  Blood Updated:  11/13/19 2332     PTT 31.1 seconds     Protime-INR [[id number]]  (Normal) Collected:  11/13/19 2314    Specimen:  Blood Updated:  11/13/19 2332     Protime 13.5 Seconds      INR 1.00    CBC Auto Differential [[id number]]  (Abnormal) Collected:  11/13/19 2314    Specimen:  Blood Updated:  11/13/19 2322     WBC 11.29 10*3/mm3      RBC 3.85 10*6/mm3      Hemoglobin 11.8 g/dL      Hematocrit 34.7 %      MCV 90.1 fL      MCH 30.6 pg      MCHC 34.0 g/dL      RDW 13.1 %      RDW-SD 42.7 fl      MPV 9.7 fL      Platelets 344 10*3/mm3      Neutrophil % 77.2 %      Lymphocyte % 13.8 %      Monocyte % 6.6 %      Eosinophil % 1.5 %      Basophil % 0.4 %      Immature Grans % 0.5 %      Neutrophils, Absolute 8.72 10*3/mm3      Lymphocytes, Absolute 1.56 10*3/mm3      Monocytes, Absolute 0.74 10*3/mm3      Eosinophils, Absolute 0.17 10*3/mm3      Basophils, Absolute 0.04 10*3/mm3      Immature Grans, Absolute 0.06 10*3/mm3      nRBC 0.0 /100 WBC               Plan:   CV: Heart rate and blood pressure stable  Pulmonary: Oxygen level stable   GI: N.p.o.  : Adequate urine output  Neuro: Exam stable  MRI of the thoracic spine to be done today to check the acuity of fracture.  Continue with pain control.  PT to see and evaluate today    Car Mejia,  APRN

## 2019-11-14 NOTE — H&P
Date of Admission: 11/13/2019  Primary Care Physician: Billy Leal MD        Subjective:    Chief complaint back pain    HPI: 74-year-old female who slipped on some ice on Monday.  She landed backwards on her buttocks.  She has had left paraspinal back pain and midthoracic area ever since.  Is worse when she lies on her left side.  She denies any lower extremity pain numbness or tingling.  She went to an outside emergency room and had a CT scan which was curious for a T7 compression fracture. she was transferred here for neurosurgical evaluation    Review of Systems   Musculoskeletal: Positive for back pain.   All other systems reviewed and are negative.       Past Medical History:   Past Medical History:   Diagnosis Date   • Colon polyp    • Diverticulitis    • Duodenal ulcer    • GERD (gastroesophageal reflux disease)    • Hemorrhoids    • Histoplasmosis    • Hyperlipidemia    • Hypertension        Past Surgical History:   Past Surgical History:   Procedure Laterality Date   • APPENDECTOMY     • CHOLECYSTECTOMY     • COLONOSCOPY  03/25/2014   • ENDOSCOPY  07/14/2015   • HYSTERECTOMY     • REPLACEMENT TOTAL KNEE     • SPINE SURGERY     • TOTAL HIP ARTHROPLASTY Bilateral     x 2   • TUBAL ABDOMINAL LIGATION         Family History: family history is not on file.    Social History:  reports that she has quit smoking. She has never used smokeless tobacco. She reports that she does not drink alcohol or use drugs.    Code Status: full    Medications:  Medications Prior to Admission   Medication Sig Dispense Refill Last Dose   • aspirin 81 MG EC tablet Take 81 mg by mouth Daily.   Taking   • ferrous sulfate 325 (65 FE) MG tablet Take 325 mg by mouth Daily With Breakfast.   Taking   • FLUTICASONE PROPIONATE, INHAL, IN Inhale Daily.   Taking   • LORazepam (ATIVAN) 0.5 MG tablet TK 1 T PO TID PRA. NO DRIVING IF DROWSY PRN  0 Taking   • losartan-hydrochlorothiazide (HYZAAR) 50-12.5 MG per tablet TK 1 T PO D  3  Taking   • omeprazole (priLOSEC) 20 MG capsule Take 20 mg by mouth Daily.   Taking   • vitamin B-12 (CYANOCOBALAMIN) 100 MCG tablet Take 50 mcg by mouth Daily.          Allergies:  Allergies   Allergen Reactions   • Demerol [Meperidine] Nausea And Vomiting   • Morphine And Related Nausea And Vomiting   • Prednisone Other (See Comments)     Pt does not recall   • Stadol [Butorphanol] Other (See Comments)     Pt does not recall   • Talwin [Pentazocine] Mental Status Change   • Adhesive Tape Rash       Objective:  Physical Exam   Constitutional: She is oriented to person, place, and time. She appears well-developed and well-nourished.   Cardiovascular: Normal rate and regular rhythm.   Pulmonary/Chest: Effort normal. No stridor. No respiratory distress.   Abdominal: Soft. She exhibits no distension.   Neurological: She is oriented to person, place, and time. She has normal strength. She has a normal Finger-Nose-Finger Test. Gait normal.   Psychiatric: Her speech is normal.       Neurologic Exam     Mental Status   Oriented to person, place, and time.   Attention: normal.   Speech: speech is normal   Level of consciousness: alert  Knowledge: good.     Cranial Nerves   Cranial nerves II through XII intact.     Motor Exam   Muscle bulk: normal  Overall muscle tone: normal  Right arm pronator drift: absent  Left arm pronator drift: absent    Strength   Strength 5/5 throughout.     Sensory Exam   Light touch normal.   Pinprick normal.     Gait, Coordination, and Reflexes     Gait  Gait: normal    Coordination   Finger to nose coordination: normal    Tremor   Resting tremor: absent  Intention tremor: absent  Action tremor: absent    Reflexes   Reflexes 2+ except as noted.       Vital Signs  Temp:  [97.8 °F (36.6 °C)] 97.8 °F (36.6 °C)  Heart Rate:  [75] 75  Resp:  [19] 19  BP: (189)/(75) 189/75        Results Review:  I reviewed the patient's new clinical results.  I reviewed the patient's new imaging results and agree with  the interpretation.  I reviewed the patient's other test results and agree with the interpretation         Lab Results (last 24 hours)     ** No results found for the last 24 hours. **          Imaging Results (Last 24 Hours)     ** No results found for the last 24 hours. **             Active Hospital Problems    Diagnosis   • Compression fracture of body of thoracic vertebra (CMS/HCC)       Assessment/Plan:   CT scan of the thoracic and lumbar spine shows multilevel degenerative changes throughout.  There is the suggestion of a superior endplate fracture of T7.  The patient is complaining of paraspinal leftward back pain at about T7.  We are getting an MRI of the thoracic spine tomorrow and a CT scan of the chest without contrast to assess for her rib fractures and whether there is any acute compression deformity of T7.    I discussed the patients findings and my recommendations with patient, family and nursing staff    Artemio Argueta MD  11/13/19  10:52 PM    Time: More than 50% of time spent in counseling and coordination of care:  Total face-to-face/floor time 60 min.  Time spent in counseling 30 min. Counseling included the following topics: Diagnosis, condition, treatment, plan

## 2019-11-14 NOTE — PLAN OF CARE
Problem: Patient Care Overview  Goal: Plan of Care Review  Outcome: Ongoing (interventions implemented as appropriate)   11/14/19 3722   Coping/Psychosocial   Plan of Care Reviewed With patient   Plan of Care Review   Progress improving   OTHER   Outcome Summary OT evaluation completed. Pt demo no further need for skilled OT services, as pt is at her baseline. Pt completed full shower and dressing task prior to therapy evaluation independently. Pt is at her baseline with ADLs and completed all functional mobility with mod I. OT will sign off at this time with recommendation for pt to discharge home with assist.

## 2019-11-14 NOTE — PLAN OF CARE
Problem: Patient Care Overview  Goal: Plan of Care Review  Outcome: Ongoing (interventions implemented as appropriate)   11/14/19 0335   Coping/Psychosocial   Plan of Care Reviewed With patient;family   Plan of Care Review   Progress no change   OTHER   Outcome Summary Pt came to floor as a DA from [facility]. Pt was very nervous and anxious upon to arrival to the floor with a SBP of 189. Gave pt Ativan and Toradol; pt has been resting well throughout the night. MRI and CT of chest scheduled for 11/14. MRI checklist complete. Cont to monitor VS. Bed alarm set.        Problem: Fall Risk (Adult)  Goal: Identify Related Risk Factors and Signs and Symptoms  Outcome: Ongoing (interventions implemented as appropriate)    Goal: Absence of Fall  Outcome: Ongoing (interventions implemented as appropriate)      Problem: Pain, Chronic (Adult)  Goal: Identify Related Risk Factors and Signs and Symptoms  Outcome: Ongoing (interventions implemented as appropriate)    Goal: Acceptable Pain/Comfort Level and Functional Ability  Outcome: Ongoing (interventions implemented as appropriate)      Problem: Anxiety (Adult)  Goal: Identify Related Risk Factors and Signs and Symptoms  Outcome: Ongoing (interventions implemented as appropriate)    Goal: Reduction/Resolution  Outcome: Ongoing (interventions implemented as appropriate)      Problem: Fracture Orthopaedic (Adult)  Goal: Signs and Symptoms of Listed Potential Problems Will be Absent, Minimized or Managed (Fracture Orthopaedic)  Outcome: Ongoing (interventions implemented as appropriate)      Problem: Pain, Acute (Adult)  Goal: Identify Related Risk Factors and Signs and Symptoms  Outcome: Ongoing (interventions implemented as appropriate)    Goal: Acceptable Pain Control/Comfort Level  Outcome: Ongoing (interventions implemented as appropriate)

## 2019-11-14 NOTE — PROGRESS NOTES
Discharge Planning Assessment   [facility]     Patient Name: Pascale Jimenez  MRN: [id number]  Today's Date: 11/14/2019    Admit Date: 11/13/2019    Discharge Needs Assessment     Row Name 11/14/19 1056       Living Environment    Lives With  other (see comments)    Name(s) of Who Lives With Patient  cares for 2 grandkids    Current Living Arrangements  home/apartment/condo    Primary Care Provided by  self    Provides Primary Care For  no one    Family Caregiver if Needed  child(jeanie), adult    Quality of Family Relationships  helpful;involved;supportive    Able to Return to Prior Arrangements  yes       Resource/Environmental Concerns    Resource/Environmental Concerns  none    Transportation Concerns  car, none       Transition Planning    Patient/Family Anticipates Transition to  home    Patient/Family Anticipated Services at Transition  none    Transportation Anticipated  family or friend will provide       Discharge Needs Assessment    Readmission Within the Last 30 Days  no previous admission in last 30 days    Concerns to be Addressed  no discharge needs identified;denies needs/concerns at this time    Equipment Currently Used at Home  cane, straight;shower chair;commode;walker, standard;walker, rolling    Anticipated Changes Related to Illness  none    Equipment Needed After Discharge  none    Discharge Coordination/Progress  Pt has RX coverage and a PCP. Pt plans on discharging home to care for her 2 grandchildren when medically stable. Pt states that children are in the care of other grandparents at this time. Pt denies any needs or concerns at this time. SW will follow and assist with any other discharge needs that may arise.         Discharge Plan    No documentation.       Destination      No service coordination in this encounter.      Durable Medical Equipment      No service coordination in this encounter.      Dialysis/Infusion      No service coordination in this encounter.      Home Medical Care       No service coordination in this encounter.      Therapy      No service coordination in this encounter.      Community Resources      No service coordination in this encounter.          Demographic Summary    No documentation.       Functional Status    No documentation.       Psychosocial    No documentation.       Abuse/Neglect    No documentation.       Legal    No documentation.       Substance Abuse    No documentation.       Patient Forms    No documentation.           Kaitlynn Dey

## 2019-11-15 VITALS
RESPIRATION RATE: 16 BRPM | DIASTOLIC BLOOD PRESSURE: 69 MMHG | WEIGHT: 212 LBS | HEIGHT: 66 IN | TEMPERATURE: 97.8 F | HEART RATE: 96 BPM | BODY MASS INDEX: 34.07 KG/M2 | OXYGEN SATURATION: 96 % | SYSTOLIC BLOOD PRESSURE: 154 MMHG

## 2019-11-15 PROCEDURE — 99217 PR OBSERVATION CARE DISCHARGE MANAGEMENT: CPT | Performed by: NURSE PRACTITIONER

## 2019-11-15 PROCEDURE — G0378 HOSPITAL OBSERVATION PER HR: HCPCS

## 2019-11-15 PROCEDURE — 94799 UNLISTED PULMONARY SVC/PX: CPT

## 2019-11-15 RX ORDER — ACETAMINOPHEN AND CODEINE PHOSPHATE 300; 30 MG/1; MG/1
1 TABLET ORAL EVERY 8 HOURS PRN
Qty: 90 TABLET | Refills: 0 | Status: SHIPPED | OUTPATIENT
Start: 2019-11-15 | End: 2021-01-19

## 2019-11-15 RX ADMIN — FERROUS SULFATE TAB 325 MG (65 MG ELEMENTAL FE) 325 MG: 325 (65 FE) TAB at 10:15

## 2019-11-15 RX ADMIN — ASPIRIN 81 MG: 81 TABLET ORAL at 10:15

## 2019-11-15 RX ADMIN — PANTOPRAZOLE SODIUM 40 MG: 40 TABLET, DELAYED RELEASE ORAL at 06:04

## 2019-11-15 RX ADMIN — BUDESONIDE 1 PUFF: 180 AEROSOL, POWDER RESPIRATORY (INHALATION) at 06:36

## 2019-11-15 RX ADMIN — LOSARTAN POTASSIUM: 50 TABLET, FILM COATED ORAL at 10:15

## 2019-11-15 NOTE — PROGRESS NOTES
Continued Stay Note  [facility]     Patient Name: Pascale Jimenez  MRN: [id number]  Today's Date: 11/15/2019    Admit Date: 11/13/2019    Discharge Plan     Row Name 11/15/19 1044       Plan    Final Discharge Disposition Code  01 - home or self-care    Final Note  PT IS BEING DCD HOME TODAY WITH NO DC NEEDS. PT HAS DME AT HOME.         Discharge Codes    No documentation.       Expected Discharge Date and Time     Expected Discharge Date Expected Discharge Time    Nov 15, 2019             FLORENCE Porras

## 2019-11-15 NOTE — DISCHARGE SUMMARY
Date of Discharge:  11/15/2019    Discharge Diagnosis: Thoracic pain, T7 compression deformity    Presenting Problem/History of Present Illness  Compression fracture of body of thoracic vertebra (CMS/HCC) [S22.000A]  Compression fracture of body of thoracic vertebra (CMS/HCC) [S22.000A]       Hospital Course      This is a 74-year-old female patient who slipped and fell on ice on November 11, 2019.  She landed backwards on her buttocks and had left paraspinal pain and pain in her midthoracic region.  Pain did get to the point where she had to go to the emergency room.  She was not complaining of pain in her legs or numbness and tingling.  She says the pain in her back is constant.  She has CT scan at the outside facility which did show concern for T7 compression deformity.  She was transferred to [facility] for neurosurgical evaluation.  An MRI was done which did show an acute T7 compression deformity.  Patient has been managing this with anti-inflammatory medication.  She says the pain is still present but it is tolerable at this time.  She thinks that she would like to go home and try to treat this conservatively for bracing.  We will order a TLSO brace for the patient.  We will also order a shower chair for her.  We will follow-up again with her in 2 weeks.  I will give her an order for Tylenol 3 to help with the pain.  She was told to call our office if she had any worsening pain.  She is not to engage in any strenuous activity including bending lifting or twisting    Procedures Performed         Consults:   Consults     No orders found from 10/15/2019 to 11/14/2019.          Pertinent Test Results: radiology: MRI: Showed a T7 compression deformity that is acute.  There is no retropulsion or concern for instability.  No cord signal change    Condition on Discharge: Stable    Vital Signs  Temp:  [97.5 °F (36.4 °C)-98.3 °F (36.8 °C)] 97.8 °F (36.6 °C)  Heart Rate:  [73-98] 96  Resp:  [16-18] 16  BP:  (154-174)/(54-78) 154/69    Physical Exam:   Physical Exam   Constitutional: She is oriented to person, place, and time. She appears well-developed and well-nourished.   HENT:   Head: Normocephalic.   Eyes: Conjunctivae, EOM and lids are normal. Pupils are equal, round, and reactive to light.   Neck: Normal range of motion.   Cardiovascular: Normal rate, regular rhythm and normal heart sounds.   Pulmonary/Chest: Effort normal and breath sounds normal.   Abdominal: Normal appearance.   Musculoskeletal: Normal range of motion.        Thoracic back: She exhibits pain.   Neurological: She is alert and oriented to person, place, and time. She has normal strength and normal reflexes. She displays normal reflexes. No cranial nerve deficit or sensory deficit. GCS eye subscore is 4. GCS verbal subscore is 5. GCS motor subscore is 6.   Skin: Skin is warm.   Psychiatric: She has a normal mood and affect. Her speech is normal and behavior is normal. Thought content normal. Cognition and memory are normal.        Neurologic Exam     Mental Status   Oriented to person, place, and time.   Attention: normal. Concentration: normal.   Speech: speech is normal   Level of consciousness: alert  Normal comprehension.     Cranial Nerves   Cranial nerves II through XII intact.     CN III, IV, VI   Pupils are equal, round, and reactive to light.  Extraocular motions are normal.     Motor Exam   Muscle bulk: normal    Strength   Strength 5/5 throughout.     Sensory Exam   Light touch normal.          Discharge Disposition  Home or Self Care    Discharge Medications     Discharge Medications      New Medications      Instructions Start Date   acetaminophen-codeine 300-30 MG per tablet  Commonly known as:  TYLENOL #3   1 tablet, Oral, Every 8 Hours PRN         Continue These Medications      Instructions Start Date   aspirin 81 MG EC tablet   81 mg, Oral, Daily      ferrous sulfate 325 (65 FE) MG tablet   325 mg, Oral, Daily With Breakfast       hydroCHLOROthiazide 12.5 MG tablet  Commonly known as:  HYDRODIURIL   12.5 mg, Oral, Daily      losartan 100 MG tablet  Commonly known as:  COZAAR   100 mg, Oral, Daily      omeprazole 20 MG capsule  Commonly known as:  priLOSEC   20 mg, Oral, 2 Times Daily      vitamin B-12 100 MCG tablet  Commonly known as:  CYANOCOBALAMIN   50 mcg, Oral, Daily             Discharge Diet:   Diet Instructions     Diet: Regular; Thin      Discharge Diet:  Regular    Fluid Consistency:  Thin          Activity at Discharge:   Activity Instructions     Other Instructions (Specify)      Activity Instructions: no strenuous activity. Wear brace when out of bed or sitting up          Follow-up Appointments  No future appointments.  Additional Instructions for the Follow-ups that You Need to Schedule     Call MD With Problems / Concerns   As directed      Call with worsening back or leg pain, drainage from wound, fever, or difficulty walking    Order Comments:  Call with worsening back or leg pain, drainage from wound, fever, or difficulty walking          Discharge Follow-up with Specialty: gurdeep mejia np; 2 Weeks   As directed      Specialty:  gurdeep mejia np    Follow Up:  2 Weeks    Follow Up Details:  follow up on a day when Dr Argueta is in the office               Test Results Pending at Discharge       Gurdeep Mejia, JUAN  11/15/19  8:28 AM    Time: Discharge 31 min

## 2019-11-15 NOTE — PLAN OF CARE
Problem: Patient Care Overview  Goal: Plan of Care Review  Outcome: Ongoing (interventions implemented as appropriate)   11/15/19 0303   Coping/Psychosocial   Plan of Care Reviewed With patient   Plan of Care Review   Progress improving   OTHER   Outcome Summary A&Ox4. C/o back pain and anxious; controlled with IV Toradol and PO Ativan. Pt ambuating steady. Anxious to go home. Pt plans to hire someone to help her in the home to care for her grandkids. D/c SCDs per pt request; called Ellie for OK. VSS.        Problem: Fall Risk (Adult)  Goal: Identify Related Risk Factors and Signs and Symptoms  Outcome: Outcome(s) achieved Date Met: 11/15/19    Goal: Absence of Fall  Outcome: Outcome(s) achieved Date Met: 11/15/19      Problem: Pain, Chronic (Adult)  Goal: Identify Related Risk Factors and Signs and Symptoms  Outcome: Outcome(s) achieved Date Met: 11/15/19    Goal: Acceptable Pain/Comfort Level and Functional Ability  Outcome: Outcome(s) achieved Date Met: 11/15/19      Problem: Anxiety (Adult)  Goal: Identify Related Risk Factors and Signs and Symptoms  Outcome: Ongoing (interventions implemented as appropriate)    Goal: Reduction/Resolution  Outcome: Outcome(s) achieved Date Met: 11/15/19      Problem: Fracture Orthopaedic (Adult)  Goal: Signs and Symptoms of Listed Potential Problems Will be Absent, Minimized or Managed (Fracture Orthopaedic)  Outcome: Ongoing (interventions implemented as appropriate)      Problem: Pain, Acute (Adult)  Goal: Identify Related Risk Factors and Signs and Symptoms  Outcome: Ongoing (interventions implemented as appropriate)    Goal: Acceptable Pain Control/Comfort Level  Outcome: Ongoing (interventions implemented as appropriate)

## 2019-11-27 ENCOUNTER — HOSPITAL ENCOUNTER (OUTPATIENT)
Dept: GENERAL RADIOLOGY | Facility: HOSPITAL | Age: 74
Discharge: HOME | End: 2019-11-27
Admitting: NURSE PRACTITIONER

## 2019-11-27 ENCOUNTER — TELEPHONE (OUTPATIENT)
Dept: NEUROSURGERY | Facility: CLINIC | Age: 74
End: 2019-11-27

## 2019-11-27 ENCOUNTER — OFFICE VISIT (OUTPATIENT)
Dept: NEUROSURGERY | Facility: CLINIC | Age: 74
End: 2019-11-27

## 2019-11-27 VITALS
SYSTOLIC BLOOD PRESSURE: 120 MMHG | WEIGHT: 212.5 LBS | HEIGHT: 65 IN | BODY MASS INDEX: 35.4 KG/M2 | DIASTOLIC BLOOD PRESSURE: 70 MMHG

## 2019-11-27 DIAGNOSIS — M54.50 ACUTE BILATERAL LOW BACK PAIN WITHOUT SCIATICA: ICD-10-CM

## 2019-11-27 DIAGNOSIS — S22.000A COMPRESSION FRACTURE OF BODY OF THORACIC VERTEBRA (HCC): ICD-10-CM

## 2019-11-27 DIAGNOSIS — Z87.891 FORMER SMOKER: ICD-10-CM

## 2019-11-27 DIAGNOSIS — S22.000A COMPRESSION FRACTURE OF BODY OF THORACIC VERTEBRA (HCC): Primary | ICD-10-CM

## 2019-11-27 PROCEDURE — 72070 X-RAY EXAM THORAC SPINE 2VWS: CPT

## 2019-11-27 PROCEDURE — 99213 OFFICE O/P EST LOW 20 MIN: CPT | Performed by: NURSE PRACTITIONER

## 2019-11-27 PROCEDURE — 72100 X-RAY EXAM L-S SPINE 2/3 VWS: CPT

## 2019-11-27 RX ORDER — FLUTICASONE PROPIONATE 50 MCG
SPRAY, SUSPENSION (ML) NASAL
Refills: 3 | COMMUNITY
Start: 2019-08-29

## 2019-11-27 NOTE — TELEPHONE ENCOUNTER
Patient went for x-rays after her appointment today, Car reviewed them and patient was called with the results, x-rays did not show new fracture and her Thoracic fracture was stable, she is scheduled for her MRI on 12/04/19,

## 2019-11-27 NOTE — PROGRESS NOTES
"    Chief complaint:   Chief Complaint   Patient presents with   • Thoracic compression fracture     Pascale is returning today with complaint of severe pain, she had an appointment for next week but called the office and felt she should be seen sooner because of her pain level.  She is in her brace for her compression fracture but isn't sure if it is fitted correctly.         Subjective     HPI: This is a 74-year-old female patient who slipped and fell on the ice on November 11, 2019.  She did sustain a T7 compression deformity.  She was improving and had her pain at a tolerable level when she left the hospital.  She was in a TLSO brace.  She comes in today for further evaluation the patient states that she continues to have pain in her back down more so in the lumbar region on both the left and right sides.  She is also complaining of pain up around her bra area which is consistent with a T7 compression deformity.  There is never any imaging done of her low back as the majority of her complaint was in the thoracic region.  Denies any lower extremity pain or numbness and tingling.  Denies any bowel or bladder incontinence.  She remains in the brace.  Rates her pain on a scale of 0-10 at a 8.  She says it can interfere with actives of daily living.  She does take over-the-counter medicine as well as Tylenol with codeine to help with the pain    Review of Systems   Musculoskeletal: Positive for back pain.   Neurological: Negative.          Objective      Vital Signs  /70 (BP Location: Right arm, Patient Position: Sitting)   Ht 165.1 cm (65\")   Wt 96.4 kg (212 lb 8 oz)   BMI 35.36 kg/m²     Physical Exam   Constitutional: She is oriented to person, place, and time. She appears well-developed and well-nourished.   HENT:   Head: Normocephalic.   Eyes: EOM are normal. Pupils are equal, round, and reactive to light.   Neck: Normal range of motion.   Pulmonary/Chest: Effort normal.   Musculoskeletal: Normal range of " motion.        Thoracic back: She exhibits pain.        Lumbar back: She exhibits pain.   Neurological: She is alert and oriented to person, place, and time. She has normal strength and normal reflexes. No cranial nerve deficit or sensory deficit. Gait normal. GCS eye subscore is 4. GCS verbal subscore is 5. GCS motor subscore is 6.   Skin: Skin is warm.   Psychiatric: She has a normal mood and affect. Her speech is normal and behavior is normal. Thought content normal.       Results Review: No new imaging        Assessment/Plan: At this point I think it would be prudent to send the patient for x-rays of the thoracic and lumbar spine.  In addition of that I think it would also be of a benefit to send the patient for an MRI of the lumbar spine to see if any of the vertebrae that show an acute compression deformity.  We will follow-up with her once the imaging is completed.  BMI shows that she is overweight.  Be much chart was given to the patient.  She is a non-smoker      Pascale was seen today for thoracic compression fracture.    Diagnoses and all orders for this visit:    Compression fracture of body of thoracic vertebra (CMS/HCC)  -     XR Spine Thoracic 2 View; Future  -     MRI Lumbar Spine Without Contrast; Future    Acute bilateral low back pain without sciatica  -     XR Spine Lumbar AP & Lateral; Future  -     MRI Lumbar Spine Without Contrast; Future    BMI 35.0-35.9,adult    Former smoker        I discussed the patients findings and my recommendations with patient  Car Mejia, JUAN  11/27/19  12:43 PM

## 2019-11-27 NOTE — PATIENT INSTRUCTIONS

## 2019-12-04 ENCOUNTER — HOSPITAL ENCOUNTER (OUTPATIENT)
Dept: MRI IMAGING | Facility: HOSPITAL | Age: 74
Discharge: HOME | End: 2019-12-04
Admitting: NURSE PRACTITIONER

## 2019-12-04 DIAGNOSIS — S22.000A COMPRESSION FRACTURE OF BODY OF THORACIC VERTEBRA (HCC): ICD-10-CM

## 2019-12-04 DIAGNOSIS — M54.50 ACUTE BILATERAL LOW BACK PAIN WITHOUT SCIATICA: ICD-10-CM

## 2019-12-04 PROCEDURE — 72148 MRI LUMBAR SPINE W/O DYE: CPT

## 2019-12-11 ENCOUNTER — TELEPHONE (OUTPATIENT)
Dept: NEUROSURGERY | Facility: CLINIC | Age: 74
End: 2019-12-11

## 2019-12-11 NOTE — TELEPHONE ENCOUNTER
Pascale has called the office and left a message for a call back requesting an appointment back to the office in December, right now she is scheduled for January, but she wants to be seen before the end of the year, she also wants the results on her recent MRI.    I tried to return her call, no answer but I have left a message for a call back.

## 2019-12-13 ENCOUNTER — TELEPHONE (OUTPATIENT)
Dept: NEUROSURGERY | Facility: CLINIC | Age: 74
End: 2019-12-13

## 2019-12-13 DIAGNOSIS — S22.000A COMPRESSION FRACTURE OF BODY OF THORACIC VERTEBRA (HCC): Primary | ICD-10-CM

## 2019-12-13 NOTE — TELEPHONE ENCOUNTER
Patient called and given the results of her MRI of the lumbar spine.  She states her low back is not bothering as much as her mid back is at this time.  It is known that the patient does have a T7 compression deformity.  We will order a new set of x-rays of her thoracic spine and see her in follow-up next week

## 2019-12-16 ENCOUNTER — HOSPITAL ENCOUNTER (OUTPATIENT)
Dept: GENERAL RADIOLOGY | Facility: HOSPITAL | Age: 74
Discharge: HOME | End: 2019-12-16
Admitting: NURSE PRACTITIONER

## 2019-12-16 DIAGNOSIS — S22.000A COMPRESSION FRACTURE OF BODY OF THORACIC VERTEBRA (HCC): ICD-10-CM

## 2019-12-16 PROCEDURE — 72070 X-RAY EXAM THORAC SPINE 2VWS: CPT

## 2019-12-17 ENCOUNTER — OFFICE VISIT (OUTPATIENT)
Dept: NEUROSURGERY | Facility: CLINIC | Age: 74
End: 2019-12-17

## 2019-12-17 VITALS
BODY MASS INDEX: 35.32 KG/M2 | WEIGHT: 212 LBS | SYSTOLIC BLOOD PRESSURE: 140 MMHG | DIASTOLIC BLOOD PRESSURE: 90 MMHG | HEIGHT: 65 IN

## 2019-12-17 DIAGNOSIS — S22.000A COMPRESSION FRACTURE OF BODY OF THORACIC VERTEBRA (HCC): Primary | ICD-10-CM

## 2019-12-17 DIAGNOSIS — Z87.891 FORMER SMOKER: ICD-10-CM

## 2019-12-17 PROCEDURE — 99213 OFFICE O/P EST LOW 20 MIN: CPT | Performed by: NURSE PRACTITIONER

## 2019-12-17 NOTE — PATIENT INSTRUCTIONS

## 2019-12-17 NOTE — PROGRESS NOTES
"    Chief complaint:   Chief Complaint   Patient presents with   • Compression fracture     Pascale is returning for follow up for her pain from a compression fracture, she states her pain level is still at a level 6.         Subjective     HPI: This is a 74-year-old female patient who slipped and fell on the ice on November 11, 2019.  The patient did sustain a T7 compression deformity.  She states that since she has been in the hospital she does feel that she is about two thirds of the way better but still does have this nagging pain in her back right below her bra.  He says it is exacerbated by the fact that she does have a productive cough at this time which is causing her more pain.  She remains in the brace.  Denies any lower extremity pain or numbness and tingling.  Rates her pain on a scale of 0-10 is 6.  She says it does interfere with her activities of daily living.  Denies any bowel or bladder incontinence    Review of Systems   Musculoskeletal: Positive for back pain.   Neurological: Negative.          Objective      Vital Signs  /90 (BP Location: Right arm, Patient Position: Sitting)   Ht 165.1 cm (65\")   Wt 96.2 kg (212 lb)   BMI 35.28 kg/m²     Physical Exam   Constitutional: She is oriented to person, place, and time. She appears well-developed and well-nourished.   HENT:   Head: Normocephalic.   Eyes: Pupils are equal, round, and reactive to light. EOM are normal.   Neck: Normal range of motion.   Pulmonary/Chest: Effort normal.   Musculoskeletal: Normal range of motion.        Thoracic back: She exhibits pain.        Lumbar back: She exhibits pain.   Neurological: She is alert and oriented to person, place, and time. She has normal strength and normal reflexes. No cranial nerve deficit or sensory deficit. Gait normal. GCS eye subscore is 4. GCS verbal subscore is 5. GCS motor subscore is 6.   Skin: Skin is warm.   Psychiatric: She has a normal mood and affect. Her speech is normal and behavior " is normal. Thought content normal.       Results Review: X-rays of the thoracic spine show stable compression deformity of T7 without retropulsion        Assessment/Plan: At this point the patient is going to continue with conservative treatment to try and get over her compression deformity.  We are going to allow her to come out of the brace due to the fact that she feels like it is restricting her breathing and aggravating her with the cough.  We will follow-up again with her in 2 weeks to see how she is doing.  She was told to call us if she had any further problems.    Patient is a smoker  BMI shows the patient is Obese. BMI chart was given to the patient.     Pascale was seen today for compression fracture.    Diagnoses and all orders for this visit:    Compression fracture of body of thoracic vertebra (CMS/HCC)    Former smoker    BMI 35.0-35.9,adult        I discussed the patients findings and my recommendations with patient  Car Mejia, APRN  12/17/19  8:56 AM

## 2020-11-14 ENCOUNTER — HOSPITAL ENCOUNTER (OUTPATIENT)
Facility: HOSPITAL | Age: 75
Setting detail: OBSERVATION
Discharge: HOME HEALTH | End: 2020-11-16
Attending: FAMILY MEDICINE | Admitting: FAMILY MEDICINE

## 2020-11-14 ENCOUNTER — APPOINTMENT (OUTPATIENT)
Dept: MRI IMAGING | Facility: HOSPITAL | Age: 75
End: 2020-11-14

## 2020-11-14 ENCOUNTER — APPOINTMENT (OUTPATIENT)
Dept: NEUROLOGY | Facility: HOSPITAL | Age: 75
End: 2020-11-14

## 2020-11-14 ENCOUNTER — APPOINTMENT (OUTPATIENT)
Dept: CT IMAGING | Facility: HOSPITAL | Age: 75
End: 2020-11-14

## 2020-11-14 DIAGNOSIS — Z74.09 IMPAIRED MOBILITY: ICD-10-CM

## 2020-11-14 DIAGNOSIS — Z78.9 DECREASED ACTIVITIES OF DAILY LIVING (ADL): ICD-10-CM

## 2020-11-14 DIAGNOSIS — R47.9 SPEECH DISTURBANCE, UNSPECIFIED TYPE: ICD-10-CM

## 2020-11-14 DIAGNOSIS — R93.89 ABNORMAL MRI: ICD-10-CM

## 2020-11-14 DIAGNOSIS — R90.89 ABNORMAL FINDING ON MRI OF BRAIN: ICD-10-CM

## 2020-11-14 DIAGNOSIS — G47.34 NOCTURNAL HYPOXEMIA: ICD-10-CM

## 2020-11-14 DIAGNOSIS — R29.90 STROKE-LIKE SYMPTOMS: Primary | ICD-10-CM

## 2020-11-14 LAB
ALBUMIN SERPL-MCNC: 4.2 G/DL (ref 3.5–5.2)
ALBUMIN/GLOB SERPL: 1.4 G/DL
ALP SERPL-CCNC: 99 U/L (ref 39–117)
ALT SERPL W P-5'-P-CCNC: 18 U/L (ref 1–33)
AMPHET+METHAMPHET UR QL: NEGATIVE
AMPHETAMINES UR QL: NEGATIVE
ANION GAP SERPL CALCULATED.3IONS-SCNC: 9 MMOL/L (ref 5–15)
AST SERPL-CCNC: 20 U/L (ref 1–32)
BARBITURATES UR QL SCN: NEGATIVE
BASOPHILS # BLD AUTO: 0.03 10*3/MM3 (ref 0–0.2)
BASOPHILS NFR BLD AUTO: 0.4 % (ref 0–1.5)
BENZODIAZ UR QL SCN: NEGATIVE
BILIRUB SERPL-MCNC: 0.6 MG/DL (ref 0–1.2)
BUN SERPL-MCNC: 11 MG/DL (ref 8–23)
BUN/CREAT SERPL: 20.8 (ref 7–25)
BUPRENORPHINE SERPL-MCNC: NEGATIVE NG/ML
CALCIUM SPEC-SCNC: 9.4 MG/DL (ref 8.6–10.5)
CANNABINOIDS SERPL QL: NEGATIVE
CHLORIDE SERPL-SCNC: 102 MMOL/L (ref 98–107)
CO2 SERPL-SCNC: 30 MMOL/L (ref 22–29)
COCAINE UR QL: NEGATIVE
CREAT SERPL-MCNC: 0.53 MG/DL (ref 0.57–1)
DEPRECATED RDW RBC AUTO: 43 FL (ref 37–54)
EOSINOPHIL # BLD AUTO: 0.16 10*3/MM3 (ref 0–0.4)
EOSINOPHIL NFR BLD AUTO: 1.9 % (ref 0.3–6.2)
ERYTHROCYTE [DISTWIDTH] IN BLOOD BY AUTOMATED COUNT: 13.2 % (ref 12.3–15.4)
GFR SERPL CREATININE-BSD FRML MDRD: 113 ML/MIN/1.73
GLOBULIN UR ELPH-MCNC: 3.1 GM/DL
GLUCOSE SERPL-MCNC: 128 MG/DL (ref 65–99)
HCT VFR BLD AUTO: 37.2 % (ref 34–46.6)
HGB BLD-MCNC: 12.8 G/DL (ref 12–15.9)
HOLD SPECIMEN: NORMAL
HOLD SPECIMEN: NORMAL
IMM GRANULOCYTES # BLD AUTO: 0.03 10*3/MM3 (ref 0–0.05)
IMM GRANULOCYTES NFR BLD AUTO: 0.4 % (ref 0–0.5)
INR PPP: 0.99 (ref 0.91–1.09)
LYMPHOCYTES # BLD AUTO: 1.31 10*3/MM3 (ref 0.7–3.1)
LYMPHOCYTES NFR BLD AUTO: 15.3 % (ref 19.6–45.3)
MAGNESIUM SERPL-MCNC: 1.8 MG/DL (ref 1.6–2.4)
MCH RBC QN AUTO: 30.8 PG (ref 26.6–33)
MCHC RBC AUTO-ENTMCNC: 34.4 G/DL (ref 31.5–35.7)
MCV RBC AUTO: 89.4 FL (ref 79–97)
METHADONE UR QL SCN: NEGATIVE
MONOCYTES # BLD AUTO: 0.45 10*3/MM3 (ref 0.1–0.9)
MONOCYTES NFR BLD AUTO: 5.3 % (ref 5–12)
NEUTROPHILS NFR BLD AUTO: 6.56 10*3/MM3 (ref 1.7–7)
NEUTROPHILS NFR BLD AUTO: 76.7 % (ref 42.7–76)
NRBC BLD AUTO-RTO: 0 /100 WBC (ref 0–0.2)
OPIATES UR QL: NEGATIVE
OXYCODONE UR QL SCN: NEGATIVE
PCP UR QL SCN: NEGATIVE
PLATELET # BLD AUTO: 374 10*3/MM3 (ref 140–450)
PMV BLD AUTO: 9.5 FL (ref 6–12)
POTASSIUM SERPL-SCNC: 3.8 MMOL/L (ref 3.5–5.2)
PROPOXYPH UR QL: NEGATIVE
PROT SERPL-MCNC: 7.3 G/DL (ref 6–8.5)
PROTHROMBIN TIME: 12.7 SECONDS (ref 11.9–14.6)
RBC # BLD AUTO: 4.16 10*6/MM3 (ref 3.77–5.28)
SARS-COV-2 RNA PNL SPEC NAA+PROBE: NOT DETECTED
SODIUM SERPL-SCNC: 141 MMOL/L (ref 136–145)
TRICYCLICS UR QL SCN: NEGATIVE
TROPONIN T SERPL-MCNC: <0.01 NG/ML (ref 0–0.03)
WBC # BLD AUTO: 8.54 10*3/MM3 (ref 3.4–10.8)
WHOLE BLOOD HOLD SPECIMEN: NORMAL
WHOLE BLOOD HOLD SPECIMEN: NORMAL

## 2020-11-14 PROCEDURE — 70552 MRI BRAIN STEM W/DYE: CPT

## 2020-11-14 PROCEDURE — 85025 COMPLETE CBC W/AUTO DIFF WBC: CPT

## 2020-11-14 PROCEDURE — G0378 HOSPITAL OBSERVATION PER HR: HCPCS

## 2020-11-14 PROCEDURE — 96375 TX/PRO/DX INJ NEW DRUG ADDON: CPT

## 2020-11-14 PROCEDURE — 84484 ASSAY OF TROPONIN QUANT: CPT | Performed by: NURSE PRACTITIONER

## 2020-11-14 PROCEDURE — 93010 ELECTROCARDIOGRAM REPORT: CPT | Performed by: INTERNAL MEDICINE

## 2020-11-14 PROCEDURE — 93005 ELECTROCARDIOGRAM TRACING: CPT | Performed by: FAMILY MEDICINE

## 2020-11-14 PROCEDURE — 80306 DRUG TEST PRSMV INSTRMNT: CPT | Performed by: PSYCHIATRY & NEUROLOGY

## 2020-11-14 PROCEDURE — 80053 COMPREHEN METABOLIC PANEL: CPT

## 2020-11-14 PROCEDURE — 99284 EMERGENCY DEPT VISIT MOD MDM: CPT

## 2020-11-14 PROCEDURE — 85610 PROTHROMBIN TIME: CPT

## 2020-11-14 PROCEDURE — C9803 HOPD COVID-19 SPEC COLLECT: HCPCS

## 2020-11-14 PROCEDURE — 87635 SARS-COV-2 COVID-19 AMP PRB: CPT | Performed by: NURSE PRACTITIONER

## 2020-11-14 PROCEDURE — 0 GADOBENATE DIMEGLUMINE 529 MG/ML SOLUTION: Performed by: FAMILY MEDICINE

## 2020-11-14 PROCEDURE — 95816 EEG AWAKE AND DROWSY: CPT

## 2020-11-14 PROCEDURE — 83735 ASSAY OF MAGNESIUM: CPT | Performed by: PSYCHIATRY & NEUROLOGY

## 2020-11-14 PROCEDURE — 70450 CT HEAD/BRAIN W/O DYE: CPT

## 2020-11-14 PROCEDURE — 99205 OFFICE O/P NEW HI 60 MIN: CPT | Performed by: PSYCHIATRY & NEUROLOGY

## 2020-11-14 PROCEDURE — 95816 EEG AWAKE AND DROWSY: CPT | Performed by: PSYCHIATRY & NEUROLOGY

## 2020-11-14 PROCEDURE — A9577 INJ MULTIHANCE: HCPCS | Performed by: FAMILY MEDICINE

## 2020-11-14 PROCEDURE — 25010000002 LEVETIRACETAM IN NACL 0.82% 500 MG/100ML SOLUTION: Performed by: PSYCHIATRY & NEUROLOGY

## 2020-11-14 PROCEDURE — 93005 ELECTROCARDIOGRAM TRACING: CPT

## 2020-11-14 PROCEDURE — 70551 MRI BRAIN STEM W/O DYE: CPT

## 2020-11-14 RX ORDER — ACETAMINOPHEN AND CODEINE PHOSPHATE 300; 30 MG/1; MG/1
1 TABLET ORAL EVERY 8 HOURS PRN
Status: DISCONTINUED | OUTPATIENT
Start: 2020-11-14 | End: 2020-11-15

## 2020-11-14 RX ORDER — LOSARTAN POTASSIUM 50 MG/1
100 TABLET ORAL DAILY
Status: DISCONTINUED | OUTPATIENT
Start: 2020-11-14 | End: 2020-11-16 | Stop reason: HOSPADM

## 2020-11-14 RX ORDER — SODIUM CHLORIDE 9 MG/ML
75 INJECTION, SOLUTION INTRAVENOUS ONCE
Status: COMPLETED | OUTPATIENT
Start: 2020-11-14 | End: 2020-11-14

## 2020-11-14 RX ORDER — LEVETIRACETAM 5 MG/ML
500 INJECTION INTRAVASCULAR EVERY 12 HOURS SCHEDULED
Status: DISCONTINUED | OUTPATIENT
Start: 2020-11-14 | End: 2020-11-16

## 2020-11-14 RX ORDER — ASPIRIN 81 MG/1
81 TABLET ORAL DAILY
Status: DISCONTINUED | OUTPATIENT
Start: 2020-11-15 | End: 2020-11-16 | Stop reason: HOSPADM

## 2020-11-14 RX ORDER — HYDROCHLOROTHIAZIDE 25 MG/1
12.5 TABLET ORAL DAILY
Status: DISCONTINUED | OUTPATIENT
Start: 2020-11-14 | End: 2020-11-16 | Stop reason: HOSPADM

## 2020-11-14 RX ORDER — SODIUM CHLORIDE 0.9 % (FLUSH) 0.9 %
10 SYRINGE (ML) INJECTION AS NEEDED
Status: DISCONTINUED | OUTPATIENT
Start: 2020-11-14 | End: 2020-11-16 | Stop reason: HOSPADM

## 2020-11-14 RX ORDER — SODIUM CHLORIDE 0.9 % (FLUSH) 0.9 %
10 SYRINGE (ML) INJECTION EVERY 12 HOURS SCHEDULED
Status: DISCONTINUED | OUTPATIENT
Start: 2020-11-14 | End: 2020-11-16 | Stop reason: HOSPADM

## 2020-11-14 RX ORDER — PANTOPRAZOLE SODIUM 40 MG/1
40 TABLET, DELAYED RELEASE ORAL EVERY MORNING
Status: DISCONTINUED | OUTPATIENT
Start: 2020-11-15 | End: 2020-11-16 | Stop reason: HOSPADM

## 2020-11-14 RX ORDER — ATORVASTATIN CALCIUM 40 MG/1
80 TABLET, FILM COATED ORAL NIGHTLY
Status: DISCONTINUED | OUTPATIENT
Start: 2020-11-14 | End: 2020-11-16 | Stop reason: HOSPADM

## 2020-11-14 RX ORDER — FERROUS SULFATE 325(65) MG
325 TABLET ORAL
Status: DISCONTINUED | OUTPATIENT
Start: 2020-11-15 | End: 2020-11-16 | Stop reason: HOSPADM

## 2020-11-14 RX ORDER — ONDANSETRON 2 MG/ML
4 INJECTION INTRAMUSCULAR; INTRAVENOUS EVERY 6 HOURS PRN
Status: DISCONTINUED | OUTPATIENT
Start: 2020-11-14 | End: 2020-11-16 | Stop reason: HOSPADM

## 2020-11-14 RX ADMIN — SODIUM CHLORIDE, PRESERVATIVE FREE 10 ML: 5 INJECTION INTRAVENOUS at 20:30

## 2020-11-14 RX ADMIN — SODIUM CHLORIDE 75 ML/HR: 9 INJECTION, SOLUTION INTRAVENOUS at 23:46

## 2020-11-14 RX ADMIN — LEVETIRACETAM 500 MG: 5 INJECTION INTRAVENOUS at 23:46

## 2020-11-14 RX ADMIN — GADOBENATE DIMEGLUMINE 20 ML: 529 INJECTION, SOLUTION INTRAVENOUS at 18:04

## 2020-11-14 NOTE — H&P
[facility]  HISTORY AND PHYSICAL    Date of Admission: 11/14/2020  Primary Care Physician: Car Landeros MD    Subjective     Chief Complaint: Strokelike symptoms/possible seizure    History of Present Illness  Patient is a 75-year  female presented ER with complaint of numbness.  Patient started with symptoms started 4:00 this morning when she woke up noted that her ring finger and left small finger feel numb.  Patient then got numbness around her mouth feel like eating hot pepper per patient.  Patient did not feel quite right.  Numbness has improved well in her hands and fingers.  Continue positive stroke symptoms patient went to the ER to be evaluated.  Patient currently denies any chest pain or shortness of breath.  Patient denies nausea vomit diarrhea.  Patient denies any fever night sweats chills.  Patient denies any Covid-like symptoms.    Review of Systems   Constitutional: Positive for activity change, appetite change and fatigue. Negative for chills and fever.   HENT: Negative for hearing loss, nosebleeds, tinnitus and trouble swallowing.    Eyes: Negative for visual disturbance.   Respiratory: Negative for cough, chest tightness, shortness of breath and wheezing.    Cardiovascular: Negative for chest pain, palpitations and leg swelling.   Gastrointestinal: Negative for abdominal distention, abdominal pain, blood in stool, constipation, diarrhea, nausea and vomiting.   Endocrine: Negative for cold intolerance, heat intolerance, polydipsia, polyphagia and polyuria.   Genitourinary: Negative for decreased urine volume, difficulty urinating, dysuria, flank pain, frequency and hematuria.   Musculoskeletal: Positive for arthralgias, gait problem and myalgias. Negative for joint swelling.   Skin: Negative for rash.   Allergic/Immunologic: Negative for immunocompromised state.   Neurological: Positive for weakness. Negative for dizziness,  syncope, light-headedness and headaches.   Hematological: Negative for adenopathy. Does not bruise/bleed easily.   Psychiatric/Behavioral: Positive for confusion. Negative for sleep disturbance. The patient is not nervous/anxious.         Otherwise complete ROS reviewed and negative except as mentioned in the HPI.      Past Medical History:   Past Medical History:   Diagnosis Date   • Colon polyp    • Diabetes mellitus (CMS/HCC)    • Diverticulitis    • Duodenal ulcer    • GERD (gastroesophageal reflux disease)    • Hemorrhoids    • Histoplasmosis    • Hyperlipidemia    • Hypertension        Past Surgical History:  Past Surgical History:   Procedure Laterality Date   • APPENDECTOMY     • CHOLECYSTECTOMY     • COLONOSCOPY  03/25/2014   • ENDOSCOPY  07/14/2015   • HYSTERECTOMY     • REPLACEMENT TOTAL KNEE     • SPINE SURGERY     • TOTAL HIP ARTHROPLASTY Bilateral     x 2   • TUBAL ABDOMINAL LIGATION          Family History: family history includes Cancer in her father; Heart disease in her father and mother.    Social History:  reports that she has quit smoking. She has never used smokeless tobacco. She reports that she does not drink alcohol or use drugs.    Medications:  Prior to Admission medications    Medication Sig Start Date End Date Taking? Authorizing Provider   acetaminophen-codeine (TYLENOL #3) 300-30 MG per tablet Take 1 tablet by mouth Every 8 (Eight) Hours As Needed for Moderate Pain . 11/15/19   Car Mejia APRN   aspirin 81 MG EC tablet Take 81 mg by mouth Daily.    ProviderSangeeta MD   ferrous sulfate 325 (65 FE) MG tablet Take 325 mg by mouth Daily With Breakfast.    Sangeeta Ulloa MD   fluticasone (FLONASE) 50 MCG/ACT nasal spray USE 1 SPRAY IN THE NOSTRILS BID 8/29/19   Sangeeta Ulloa MD   hydroCHLOROthiazide (HYDRODIURIL) 12.5 MG tablet Take 12.5 mg by mouth Daily.    Sangeeta Ulloa MD   losartan (COZAAR) 100 MG tablet Take 100 mg by mouth Daily.    Provider  "MD Sangeeta   omeprazole (priLOSEC) 20 MG capsule Take 20 mg by mouth 2 (Two) Times a Day.    Provider, MD Sangeeta   vitamin B-12 (CYANOCOBALAMIN) 100 MCG tablet Take 50 mcg by mouth Daily.    Provider, MD Sangeeta     Allergies:  Allergies   Allergen Reactions   • Demerol [Meperidine] Nausea And Vomiting   • Morphine And Related Nausea And Vomiting   • Stadol [Butorphanol] Other (See Comments)     Pt does not recall   • Talwin [Pentazocine] Mental Status Change   • Adhesive Tape Rash   • Prednisone Other (See Comments)     Pt does not recall       Objective     Vital Signs: /89 (BP Location: Left arm, Patient Position: Sitting)   Pulse 81   Temp 98.2 °F (36.8 °C) (Oral)   Resp 16   Ht 165.1 cm (65\")   Wt 95.3 kg (210 lb)   SpO2 97%   BMI 34.95 kg/m²   Physical Exam  Vitals signs and nursing note reviewed.   Constitutional:       Appearance: She is well-developed.   HENT:      Head: Normocephalic and atraumatic.   Eyes:      Conjunctiva/sclera: Conjunctivae normal.      Pupils: Pupils are equal, round, and reactive to light.   Neck:      Musculoskeletal: Neck supple.      Vascular: No JVD.   Cardiovascular:      Rate and Rhythm: Normal rate and regular rhythm.      Heart sounds: Normal heart sounds. No murmur. No friction rub. No gallop.    Pulmonary:      Effort: Pulmonary effort is normal. No respiratory distress.      Breath sounds: Normal breath sounds. No wheezing or rales.   Chest:      Chest wall: No tenderness.   Abdominal:      General: Bowel sounds are normal. There is no distension.      Palpations: Abdomen is soft.      Tenderness: There is no abdominal tenderness. There is no guarding or rebound.   Musculoskeletal: Normal range of motion.         General: No tenderness or deformity.   Skin:     General: Skin is warm and dry.      Capillary Refill: Capillary refill takes 2 to 3 seconds.      Findings: No rash.   Neurological:      Mental Status: She is alert and oriented to person, " place, and time.      Cranial Nerves: No cranial nerve deficit.      Motor: No abnormal muscle tone.      Deep Tendon Reflexes: Reflexes normal.      Comments: Cranial nerve 2 through 12 grossly intact.  Negative pronator drift.  Negative finger touch.  Strength is 5 out of 5 symmetrical.   Psychiatric:         Mood and Affect: Mood normal.         Behavior: Behavior normal.         Thought Content: Thought content normal.         Judgment: Judgment normal.             Results Reviewed:    Lab Results (last 24 hours)     Procedure Component Value Units Date/Time    Urine Drug Screen - Urine, Clean Catch [[id number]]  (Normal) Collected: 11/14/20 1556    Specimen: Urine, Clean Catch Updated: 11/14/20 1622     THC, Screen, Urine Negative     Phencyclidine (PCP), Urine Negative     Cocaine Screen, Urine Negative     Methamphetamine, Ur Negative     Opiate Screen Negative     Amphetamine Screen, Urine Negative     Benzodiazepine Screen, Urine Negative     Tricyclic Antidepressants Screen Negative     Methadone Screen, Urine Negative     Barbiturates Screen, Urine Negative     Oxycodone Screen, Urine Negative     Propoxyphene Screen Negative     Buprenorphine, Screen, Urine Negative    Narrative:      Cutoff For Drugs Screened:    Amphetamines               500 ng/ml  Barbiturates               200 ng/ml  Benzodiazepines            150 ng/ml  Cocaine                    150 ng/ml  Methadone                  200 ng/ml  Opiates                    100 ng/ml  Phencyclidine               25 ng/ml  THC                            50 ng/ml  Methamphetamine            500 ng/ml  Tricyclic Antidepressants  300 ng/ml  Oxycodone                  100 ng/ml  Propoxyphene               300 ng/ml  Buprenorphine               10 ng/ml    The normal value for all drugs tested is negative. This report includes unconfirmed screening results, with the cutoff values listed, to be used for medical treatment purposes only.  Unconfirmed results  must not be used for non-medical purposes such as employment or legal testing.  Clinical consideration should be applied to any drug of abuse test, particularly when unconfirmed results are used.      COVID PRE-OP / PRE-PROCEDURE SCREENING ORDER (NO ISOLATION) - Swab, Nasal Cavity [[id number]] Collected: 11/14/20 1558    Specimen: Swab from Nasal Cavity Updated: 11/14/20 1608    Narrative:      The following orders were created for panel order COVID PRE-OP / PRE-PROCEDURE SCREENING ORDER (NO ISOLATION) - Swab, Nasal Cavity.  Procedure                               Abnormality         Status                     ---------                               -----------         ------                     COVID-19,[facility]...[[id number]]                      In process                   Please view results for these tests on the individual orders.    COVID-19,[facility] IN-HOUSE,Nasal Swab No Transport Media 3-4 HR TAT - Swab, Nasal Cavity [[id number]] Collected: 11/14/20 1558    Specimen: Swab from Nasal Cavity Updated: 11/14/20 1608    Magnesium [[id number]]  (Normal) Collected: 11/14/20 1026    Specimen: Blood Updated: 11/14/20 1540     Magnesium 1.8 mg/dL     Troponin [[id number]]  (Normal) Collected: 11/14/20 1026    Specimen: Blood Updated: 11/14/20 1213     Troponin T <0.010 ng/mL     Narrative:      Troponin T Reference Range:  <= 0.03 ng/mL-   Negative for AMI  >0.03 ng/mL-     Abnormal for myocardial necrosis.  Clinicians would have to utilize clinical acumen, EKG, Troponin and serial changes to determine if it is an Acute Myocardial Infarction or myocardial injury due to an underlying chronic condition.       Results may be falsely decreased if patient taking Biotin.      [place] Draw [[id number]] Collected: 11/14/20 1026    Specimen: Blood Updated: 11/14/20 1130    Narrative:      The following orders were created for panel order [place] Draw.  Procedure                               Abnormality         Status                      ---------                               -----------         ------                     Light Blue Top[[id number]]                                   Final result               Green Top (Gel)[[id number]]                                  Final result               Lavender Top[[id number]]                                     Final result               Red Top[[id number]]                                          Final result                 Please view results for these tests on the individual orders.    Green Top (Gel) [[id number]] Collected: 11/14/20 1026    Specimen: Blood Updated: 11/14/20 1130     Extra Tube Hold for add-ons.     Comment: Auto resulted.       Red Top [[id number]] Collected: 11/14/20 1026    Specimen: Blood Updated: 11/14/20 1130     Extra Tube Hold for add-ons.     Comment: Auto resulted.       Light Blue Top [[id number]] Collected: 11/14/20 1026    Specimen: Blood Updated: 11/14/20 1130     Extra Tube hold for add-on     Comment: Auto resulted       Lavender Top [[id number]] Collected: 11/14/20 1026    Specimen: Blood Updated: 11/14/20 1130     Extra Tube hold for add-on     Comment: Auto resulted       Comprehensive Metabolic Panel [[id number]]  (Abnormal) Collected: 11/14/20 1026    Specimen: Blood Updated: 11/14/20 1057     Glucose 128 mg/dL      BUN 11 mg/dL      Creatinine 0.53 mg/dL      Sodium 141 mmol/L      Potassium 3.8 mmol/L      Chloride 102 mmol/L      CO2 30.0 mmol/L      Calcium 9.4 mg/dL      Total Protein 7.3 g/dL      Albumin 4.20 g/dL      ALT (SGPT) 18 U/L      AST (SGOT) 20 U/L      Alkaline Phosphatase 99 U/L      Total Bilirubin 0.6 mg/dL      eGFR Non African Amer 113 mL/min/1.73      Globulin 3.1 gm/dL      A/G Ratio 1.4 g/dL      BUN/Creatinine Ratio 20.8     Anion Gap 9.0 mmol/L     Narrative:      GFR Normal >60  Chronic Kidney Disease <60  Kidney Failure <15      Protime-INR [[id number]]  (Normal) Collected: 11/14/20 1026    Specimen: Blood Updated: 11/14/20 1048      Protime 12.7 Seconds      INR 0.99    CBC & Differential [[id number]]  (Abnormal) Collected: 11/14/20 1026    Specimen: Blood Updated: 11/14/20 1039    Narrative:      The following orders were created for panel order CBC & Differential.  Procedure                               Abnormality         Status                     ---------                               -----------         ------                     CBC Auto Differential[[id number]]        Abnormal            Final result                 Please view results for these tests on the individual orders.    CBC Auto Differential [[id number]]  (Abnormal) Collected: 11/14/20 1026    Specimen: Blood Updated: 11/14/20 1039     WBC 8.54 10*3/mm3      RBC 4.16 10*6/mm3      Hemoglobin 12.8 g/dL      Hematocrit 37.2 %      MCV 89.4 fL      MCH 30.8 pg      MCHC 34.4 g/dL      RDW 13.2 %      RDW-SD 43.0 fl      MPV 9.5 fL      Platelets 374 10*3/mm3      Neutrophil % 76.7 %      Lymphocyte % 15.3 %      Monocyte % 5.3 %      Eosinophil % 1.9 %      Basophil % 0.4 %      Immature Grans % 0.4 %      Neutrophils, Absolute 6.56 10*3/mm3      Lymphocytes, Absolute 1.31 10*3/mm3      Monocytes, Absolute 0.45 10*3/mm3      Eosinophils, Absolute 0.16 10*3/mm3      Basophils, Absolute 0.03 10*3/mm3      Immature Grans, Absolute 0.03 10*3/mm3      nRBC 0.0 /100 WBC            Radiology Data:    Imaging Results (Last 24 Hours)     Procedure Component Value Units Date/Time    MRI Brain Without Contrast [[id number]] Collected: 11/14/20 1452     Updated: 11/14/20 1512    Narrative:      Indication: numbness around mouth and small fingers to the left hand        Technique: Multisequence, multiplanar MRI of the brain without contrast.     Comparison: CT scan dated 11/14/2020     Findings:      Abnormal diffusion signal hyperintensity diffusely along the right  hippocampus. Associated T2 FLAIR hyperintensity identified along the  left hippocampus. No T2 FLAIR abnormality identified at  the temporal  pole. The contralateral mesial temporal lobe is unremarkable. Underlying  mild chronic small vessel ischemic change. No intra-axial or extra-axial  hemorrhage. No intracranial mass lesion or visualized mass effect. The  ventricles, cortical sulci and basal cisterns are symmetric and age  appropriate. Posterior fossa structures are unremarkable. Pituitary  gland and sella are unremarkable. The major intracranial flow-voids are  preserved. Orbital contents are unremarkable. Chronic mucosal thickening  along the floors of the maxillary sinuses. Mastoid air cells are clear.  Nonspecific T2 FLAIR hyperintensity left of midline in the frontal bone.  Inner and outer table of the cortical bone appears intact and there is  no lytic lesion in this area identified on today's CT scan.        Impression:      Impression:     1. Abnormal diffusion restriction identified diffusely along the right  hippocampus. Favor post ictal change over hippocampal infarct given the  diffuse nature of the signal abnormality along the hippocampus as  compared with a focal involvement which is more typical of infarct.  2. I do not see an obvious intra-axial mass on this exam. Postcontrast  imaging could be considered to evaluate for enhancing lesion or  suspicious pachymeningeal enhancement.     Findings and recommendations were discussed with MATIAS HENSLEY on  11/14/2020 at 3:09 PM CST.  This report was finalized on 11/14/2020 15:09 by Dr Ryder Castaneda, .    CT Head Without Contrast [[id number]] Collected: 11/14/20 1246     Updated: 11/14/20 1251    Narrative:      CT HEAD WO CONTRAST- 11/14/2020 12:23 PM CST     HISTORY: numbness around mouth and small fingers in left hand       DOSE LENGTH PRODUCT: 608 mGy cm. Automated exposure control was also  utilized to decrease patient radiation dose.     Technique:   Axial CT of the brain without IV contrast. Sagittal and coronal  reformations are also provided for review. Soft tissue  and bone kernels  are available for interpretation.     Comparison: None.     Findings:      There is no evidence of acute large vascular territory infarct. No  intra-axial or extra-axial hemorrhage. No visualized mass lesion or mass  effect. The ventricles, cortical sulci and basal cisterns are symmetric  and age appropriate.  Posterior fossa structures are unremarkable. The  scalp and calvarium are intact. Visualized paranasal sinuses and  mastoids are clear.        Impression:      Impression:    1. No acute intracranial process.  This report was finalized on 11/14/2020 12:48 by Dr Ryder Castaneda, .          I have personally reviewed and interpreted the radiology studies and ECG obtained at time of admission.     Assessment / Plan      Assessment & Plan  Active Hospital Problems    Diagnosis   • Stroke-like symptoms     Plans    Strokelike symptoms/possible seizure.  TIA protocol.  Neurology consult.  CT scan head-no acute process.  MRI of the head-Abnormal diffusion restriction identified diffusely along the right hippocampus- favor post ictal change over hippocampal infarct given the diffuse nature of the signal abnormality along the hippocampus as compared with a focal involvement which is more typical of infarct.  Cardiogram.  Carotid duplex.    Chronic pain.  Tylenol 3 as needed.    CAD/hypertension.  Continue aspirin.  Continue hydrochlorthiazide.  Continue Cozaar.    Reflux.  Continue Pepcid.    Anemia.  Continue iron sulfate.    Covid-19-negative.    Code Status: Full code.     I discussed the patient's findings and my recommendations with: Patient.    Estimated length of stay: 1 to 3 days.    Electronically signed by Rafi Martin MD, 11/14/20, 4:18 PM CST.

## 2020-11-14 NOTE — ED PROVIDER NOTES
"Subjective   Patient is a 75-year-old female presents emergency department with chief complaints of numbness.  Patient states around 4 AM this morning when she woke up she noted that her left ring finger and left small finger were numb.  She states she additionally noted numbness around her mouth and describes it as \"the feeling you get after eating a hot pepper.\"  She states that she just felt as if something was not quite right.  She has felt somewhat shaky all over.  She states that the numbness has improved however remains in particular to the small fingers in her left hand.  She is concerned about a possible TIA and elected to come the ER for evaluation and treatment.  Patient denies any chest pain or shortness of breath.  She has been otherwise healthy.  She denies any nausea, vomiting, or diarrhea.  She has had no abdominal pain.  She denies any worsening cough or congestion or recorded fevers.  She reports at times she has a small cough secondary to sinus drainage but states that this is not unusual for her.          Review of Systems   Constitutional: Negative.  Negative for fever.   HENT: Negative.  Negative for congestion.    Respiratory: Negative.  Negative for cough and shortness of breath.    Cardiovascular: Negative.  Negative for chest pain.   Gastrointestinal: Negative.  Negative for abdominal pain, diarrhea, nausea and vomiting.   Genitourinary: Negative.    Musculoskeletal: Negative.    Skin: Negative.    Neurological: Positive for numbness.   All other systems reviewed and are negative.      Past Medical History:   Diagnosis Date   • Colon polyp    • Diabetes mellitus (CMS/HCC)    • Diverticulitis    • Duodenal ulcer    • GERD (gastroesophageal reflux disease)    • Hemorrhoids    • Histoplasmosis    • Hyperlipidemia    • Hypertension        Allergies   Allergen Reactions   • Demerol [Meperidine] Nausea And Vomiting   • Morphine And Related Nausea And Vomiting   • Stadol [Butorphanol] Other (See " Comments)     Pt does not recall   • Talwin [Pentazocine] Mental Status Change   • Adhesive Tape Rash   • Prednisone Other (See Comments)     Pt does not recall       Past Surgical History:   Procedure Laterality Date   • APPENDECTOMY     • CHOLECYSTECTOMY     • COLONOSCOPY  03/25/2014   • ENDOSCOPY  07/14/2015   • HYSTERECTOMY     • REPLACEMENT TOTAL KNEE     • SPINE SURGERY     • TOTAL HIP ARTHROPLASTY Bilateral     x 2   • TUBAL ABDOMINAL LIGATION         Family History   Problem Relation Age of Onset   • Colon cancer Neg Hx    • Colon polyps Neg Hx        Social History     Socioeconomic History   • Marital status:      Spouse name: Not on file   • Number of children: Not on file   • Years of education: Not on file   • Highest education level: Not on file   Tobacco Use   • Smoking status: Former Smoker   • Smokeless tobacco: Never Used   Substance and Sexual Activity   • Alcohol use: No   • Drug use: No   • Sexual activity: Defer           Objective   Physical Exam  Vitals signs and nursing note reviewed.   Constitutional:       Appearance: Normal appearance. She is well-developed.   HENT:      Head: Normocephalic and atraumatic.      Right Ear: External ear normal.      Left Ear: External ear normal.      Nose: Nose normal.      Mouth/Throat:      Mouth: Mucous membranes are moist.      Pharynx: Oropharynx is clear.   Eyes:      Conjunctiva/sclera: Conjunctivae normal.      Pupils: Pupils are equal, round, and reactive to light.   Neck:      Musculoskeletal: Normal range of motion and neck supple.   Cardiovascular:      Rate and Rhythm: Normal rate and regular rhythm.      Pulses: Normal pulses.      Heart sounds: Normal heart sounds.   Pulmonary:      Effort: Pulmonary effort is normal.      Breath sounds: Normal breath sounds.   Abdominal:      General: Abdomen is flat. Bowel sounds are normal.      Palpations: Abdomen is soft.   Musculoskeletal: Normal range of motion.   Skin:     General: Skin is  warm and dry.   Neurological:      Mental Status: She is alert and oriented to person, place, and time.      Sensory: Sensory deficit present.      Comments: Patient is alert and oriented x3, speech is clear, uvula and palate is midline, face is symmetrical, motor strength is 5/5 bilaterally to upper and lower extremities, patient reports continued numbness to her left small finger and ring finger   Psychiatric:         Mood and Affect: Mood normal.         Behavior: Behavior normal.         Thought Content: Thought content normal.         Judgment: Judgment normal.         Procedures           ED Course  ED Course as of Nov 14 1626   Sat Nov 14, 2020   1536 Spoke with Dr. Дмитрий Zhou regarding patient's MRI results.  MRI shows abnormal diffusion along the right hyper campus favoring a post ictal scenario versus atypical infarct.  Dr. Дмитрий Alexis wants the patient to be admitted for further evaluation of stroke versus seizure.  She is ordering an EEG.  Spoke with the hospitalist who is agreeable for admission.  She will be placed under Dr. Martin's list.  Please see his note for complete details.    [TW]      ED Course User Index  [TW] Cara Quiñones, JUAN                                           [facility]  Number of Diagnoses or Management Options  Abnormal MRI: new and requires workup  Stroke-like symptoms: new and requires workup     Amount and/or Complexity of Data Reviewed  Clinical lab tests: ordered and reviewed  Tests in the radiology section of CPT®: ordered and reviewed  Discuss the patient with other providers: yes    Risk of Complications, Morbidity, and/or Mortality  Presenting problems: moderate  Diagnostic procedures: moderate  Management options: moderate    Patient Progress  Patient progress: improved      Final diagnoses:   Stroke-like symptoms   Abnormal MRI            Cara Quiñones, JUAN  11/14/20 1626

## 2020-11-14 NOTE — CONSULTS
"    Neurology Consult Note    Patient:  Pascale Jimenez   YOB: 1945  MRN:  [id number]  Date of Admission:  11/14/2020 11:28 AM    Date: 11/14/2020    Referring Provider: Rafi Martin MD  Reason for Consultation: Stroke      History of present illness:     This is a 75 y.o. right handed female with H/O DM, hypertension, hyperlipidemia, former smoker, evaluated for stroke     Patient lives alone but takes care of a 79-year-old grandson grandchildren.  She is frequently visited every day by family.  Her last family member saw at 8 PM last night.  At that time she went to bed and was normal but woke up around 4 AM and noted tingling as if she had just swallowed \"hot pepper\".  It was it of the bilateral lips and not a unilateral symptoms.  In addition she noted numbness in digits 4 and 5 of the left hand.  She has a history of ulnar tunnel syndrome and has had ulnar tunnel surgery.  She is not had numbness and tingling in those 2 digits since the surgery.  She states her symptoms lasted most of the day but is currently not present so it seemed to have lasted under 12 hours.  She did note a small area of pain/headache over the right posterior occipital territory with the onset of the symptoms.  That also has resolved.    She reports that she takes a baby aspirin a day.  Today she took for because she was concerned it was a stroke    She suffered a T7 compression fracture a year ago when she slipped and fell on the ice.  She denies that she ever fell and hit her head at that time.  She denies any head trauma.    An MRI of the brain was performed and showed abnormality in the diffusion weighted imaging.  It did raise the question because of the ADC maps being negative that whether or not this could have been a seizure.  She denies any lapses of memory or of time.  She states 2 months ago she got very tired is around 2:30 PM and she went to bed and did not wake up until the next morning, otherwise she has " been awake alert and not noticing any lapses of time.  She denies any history of seizures.  She denies any history of migraines.  Past Medical History:   Diagnosis Date   • Colon polyp    • Diabetes mellitus (CMS/HCC)    • Diverticulitis    • Duodenal ulcer    • GERD (gastroesophageal reflux disease)    • Hemorrhoids    • Histoplasmosis    • Hyperlipidemia    • Hypertension        Past Surgical History:   Procedure Laterality Date   • APPENDECTOMY     • CHOLECYSTECTOMY     • COLONOSCOPY  03/25/2014   • ENDOSCOPY  07/14/2015   • HYSTERECTOMY     • REPLACEMENT TOTAL KNEE     • SPINE SURGERY     • TOTAL HIP ARTHROPLASTY Bilateral     x 2   • TUBAL ABDOMINAL LIGATION         Prior to Admission medications    Medication Sig Start Date End Date Taking? Authorizing Provider   acetaminophen-codeine (TYLENOL #3) 300-30 MG per tablet Take 1 tablet by mouth Every 8 (Eight) Hours As Needed for Moderate Pain . 11/15/19   Car Mejia APRN   aspirin 81 MG EC tablet Take 81 mg by mouth Daily.    aSngeeta Ulloa MD   ferrous sulfate 325 (65 FE) MG tablet Take 325 mg by mouth Daily With Breakfast.    Sangeeta Ulloa MD   fluticasone (FLONASE) 50 MCG/ACT nasal spray USE 1 SPRAY IN THE NOSTRILS BID 8/29/19   Sangeeta Ulloa MD   hydroCHLOROthiazide (HYDRODIURIL) 12.5 MG tablet Take 12.5 mg by mouth Daily.    Sangeeta Ulloa MD   losartan (COZAAR) 100 MG tablet Take 100 mg by mouth Daily.    Sangeeta Ulloa MD   omeprazole (priLOSEC) 20 MG capsule Take 20 mg by mouth 2 (Two) Times a Day.    Sangeeta Ulloa MD   vitamin B-12 (CYANOCOBALAMIN) 100 MCG tablet Take 50 mcg by mouth Daily.    ProviderSangeeta MD       Hospital scheduled medications:      Hospital PRN medications:  •  sodium chloride    Allergies   Allergen Reactions   • Demerol [Meperidine] Nausea And Vomiting   • Morphine And Related Nausea And Vomiting   • Stadol [Butorphanol] Other (See Comments)     Pt does not recall   •  Talwin [Pentazocine] Mental Status Change   • Adhesive Tape Rash   • Prednisone Other (See Comments)     Pt does not recall       Social History     Socioeconomic History   • Marital status:      Spouse name: Not on file   • Number of children: Not on file   • Years of education: Not on file   • Highest education level: Not on file   Tobacco Use   • Smoking status: Former Smoker   • Smokeless tobacco: Never Used   Substance and Sexual Activity   • Alcohol use: No   • Drug use: No   • Sexual activity: Defer     Family History   Problem Relation Age of Onset   • Colon cancer Neg Hx    • Colon polyps Neg Hx        Review of Systems  A 14 point review of systems was reviewed and was negative except for feeling hungry and thristy    Vital Signs   Temp:  [98.2 °F (36.8 °C)] 98.2 °F (36.8 °C)  Heart Rate:  [81] 81  Resp:  [16] 16  BP: (147)/(89) 147/89    General Exam:  Head:  Normal cephalic, atraumatic  HEENT:  Neck supple  Fundoscopic Exam:  No signs of disc edema  CVS:  Regular rate and rhythm.  No murmurs  Carotid Examination:  No bruits  Lungs:  Clear to auscultation  Abdomen:  Non-tender, Non-distended  Extremities:  No signs of peripheral edema  Skin:  No rashes    Neurologic Exam:    Mental Status:    -Awake, Alert, Oriented X 3  -No word finding difficulties  -No aphasia  -No dysarthria  -Follows simple and complex commands    CN II:  Visual fields full.  Pupils equally reactive to light  CN III, IV, VI:  Extraocular Muscles full with no signs of nystagmus  CN V:  Facial sensory is symmetric   CN VII:  Facial motor symmetric  CN VIII:  Gross hearing intact bilaterally  CN IX/X:  Palate elevates symmetrically  CN XI:  Shoulder shrug symmetric  CN XII:  Tongue is midline on protrusion    Motor: (strength out of 5:  1= minimal movement, 2 = movement in plane of gravity, 3 = movement against gravity, 4 = movement against some resistance, 5 = full strength)    -Right Upper Ext: Proximal: 5 Distal: 5  -Left  Upper Ext: Proximal: 5 Distal: 5    -Right Lower Ext: Proximal: 5 Distal: 5  -Left Lower Ext: Proximal: 5 Distal: 5    DTR:  -Right   Bicep: 2+ Triceps: 2+ Brachioradialis: 2+   Patella: 2+ Ankle: 2+ Neg Babinski  -Left   Bicep: 2+ Triceps: 2+ Brachioradialis: 2+   Patella: 2+ Ankle: 2+ Neg Babinski    Sensory:  -Intact to light touch, pinprick, temperature, pain, and proprioception    Coordination:  -Finger to nose intact  -Heel to shin intact  -No ataxia    Gait  -No signs of ataxia  -ambulates unassisted      Results Review:  Lab Results (last 7 days)     Procedure Component Value Units Date/Time    Magnesium [[id number]]  (Normal) Collected: 11/14/20 1026    Specimen: Blood Updated: 11/14/20 1540     Magnesium 1.8 mg/dL     Troponin [[id number]]  (Normal) Collected: 11/14/20 1026    Specimen: Blood Updated: 11/14/20 1213     Troponin T <0.010 ng/mL     Narrative:      Troponin T Reference Range:  <= 0.03 ng/mL-   Negative for AMI  >0.03 ng/mL-     Abnormal for myocardial necrosis.  Clinicians would have to utilize clinical acumen, EKG, Troponin and serial changes to determine if it is an Acute Myocardial Infarction or myocardial injury due to an underlying chronic condition.       Results may be falsely decreased if patient taking Biotin.      [place] Draw [[id number]] Collected: 11/14/20 1026    Specimen: Blood Updated: 11/14/20 1130    Narrative:      The following orders were created for panel order [place] Draw.  Procedure                               Abnormality         Status                     ---------                               -----------         ------                     Light Blue Top[[id number]]                                   Final result               Green Top (Gel)[[id number]]                                  Final result               Lavender Top[[id number]]                                     Final result               Red Top[[id number]]                                          Final result                  Please view results for these tests on the individual orders.    Green Top (Gel) [[id number]] Collected: 11/14/20 1026    Specimen: Blood Updated: 11/14/20 1130     Extra Tube Hold for add-ons.     Comment: Auto resulted.       Red Top [[id number]] Collected: 11/14/20 1026    Specimen: Blood Updated: 11/14/20 1130     Extra Tube Hold for add-ons.     Comment: Auto resulted.       Light Blue Top [[id number]] Collected: 11/14/20 1026    Specimen: Blood Updated: 11/14/20 1130     Extra Tube hold for add-on     Comment: Auto resulted       Lavender Top [[id number]] Collected: 11/14/20 1026    Specimen: Blood Updated: 11/14/20 1130     Extra Tube hold for add-on     Comment: Auto resulted       Comprehensive Metabolic Panel [[id number]]  (Abnormal) Collected: 11/14/20 1026    Specimen: Blood Updated: 11/14/20 1057     Glucose 128 mg/dL      BUN 11 mg/dL      Creatinine 0.53 mg/dL      Sodium 141 mmol/L      Potassium 3.8 mmol/L      Chloride 102 mmol/L      CO2 30.0 mmol/L      Calcium 9.4 mg/dL      Total Protein 7.3 g/dL      Albumin 4.20 g/dL      ALT (SGPT) 18 U/L      AST (SGOT) 20 U/L      Alkaline Phosphatase 99 U/L      Total Bilirubin 0.6 mg/dL      eGFR Non African Amer 113 mL/min/1.73      Globulin 3.1 gm/dL      A/G Ratio 1.4 g/dL      BUN/Creatinine Ratio 20.8     Anion Gap 9.0 mmol/L     Narrative:      GFR Normal >60  Chronic Kidney Disease <60  Kidney Failure <15      Protime-INR [[id number]]  (Normal) Collected: 11/14/20 1026    Specimen: Blood Updated: 11/14/20 1048     Protime 12.7 Seconds      INR 0.99    CBC & Differential [[id number]]  (Abnormal) Collected: 11/14/20 1026    Specimen: Blood Updated: 11/14/20 1039    Narrative:      The following orders were created for panel order CBC & Differential.  Procedure                               Abnormality         Status                     ---------                               -----------         ------                     CBC Auto  Differential[[id number]]        Abnormal            Final result                 Please view results for these tests on the individual orders.    CBC Auto Differential [[id number]]  (Abnormal) Collected: 11/14/20 1026    Specimen: Blood Updated: 11/14/20 1039     WBC 8.54 10*3/mm3      RBC 4.16 10*6/mm3      Hemoglobin 12.8 g/dL      Hematocrit 37.2 %      MCV 89.4 fL      MCH 30.8 pg      MCHC 34.4 g/dL      RDW 13.2 %      RDW-SD 43.0 fl      MPV 9.5 fL      Platelets 374 10*3/mm3      Neutrophil % 76.7 %      Lymphocyte % 15.3 %      Monocyte % 5.3 %      Eosinophil % 1.9 %      Basophil % 0.4 %      Immature Grans % 0.4 %      Neutrophils, Absolute 6.56 10*3/mm3      Lymphocytes, Absolute 1.31 10*3/mm3      Monocytes, Absolute 0.45 10*3/mm3      Eosinophils, Absolute 0.16 10*3/mm3      Basophils, Absolute 0.03 10*3/mm3      Immature Grans, Absolute 0.03 10*3/mm3      nRBC 0.0 /100 WBC           .  Imaging Results (Last 24 Hours)     Procedure Component Value Units Date/Time    MRI Brain Without Contrast [[id number]] Collected: 11/14/20 1452     Updated: 11/14/20 1512    Narrative:      Indication: numbness around mouth and small fingers to the left hand        Technique: Multisequence, multiplanar MRI of the brain without contrast.     Comparison: CT scan dated 11/14/2020     Findings:      Abnormal diffusion signal hyperintensity diffusely along the right  hippocampus. Associated T2 FLAIR hyperintensity identified along the  left hippocampus. No T2 FLAIR abnormality identified at the temporal  pole. The contralateral mesial temporal lobe is unremarkable. Underlying  mild chronic small vessel ischemic change. No intra-axial or extra-axial  hemorrhage. No intracranial mass lesion or visualized mass effect. The  ventricles, cortical sulci and basal cisterns are symmetric and age  appropriate. Posterior fossa structures are unremarkable. Pituitary  gland and sella are unremarkable. The major intracranial flow-voids  are  preserved. Orbital contents are unremarkable. Chronic mucosal thickening  along the floors of the maxillary sinuses. Mastoid air cells are clear.  Nonspecific T2 FLAIR hyperintensity left of midline in the frontal bone.  Inner and outer table of the cortical bone appears intact and there is  no lytic lesion in this area identified on today's CT scan.        Impression:      Impression:     1. Abnormal diffusion restriction identified diffusely along the right  hippocampus. Favor post ictal change over hippocampal infarct given the  diffuse nature of the signal abnormality along the hippocampus as  compared with a focal involvement which is more typical of infarct.  2. I do not see an obvious intra-axial mass on this exam. Postcontrast  imaging could be considered to evaluate for enhancing lesion or  suspicious pachymeningeal enhancement.     Findings and recommendations were discussed with MATIAS HENSLEY on  11/14/2020 at 3:09 PM CST.  This report was finalized on 11/14/2020 15:09 by Dr Ryder Castaneda, .    CT Head Without Contrast [[id number]] Collected: 11/14/20 1246     Updated: 11/14/20 1251    Narrative:      CT HEAD WO CONTRAST- 11/14/2020 12:23 PM CST     HISTORY: numbness around mouth and small fingers in left hand       DOSE LENGTH PRODUCT: 608 mGy cm. Automated exposure control was also  utilized to decrease patient radiation dose.     Technique:   Axial CT of the brain without IV contrast. Sagittal and coronal  reformations are also provided for review. Soft tissue and bone kernels  are available for interpretation.     Comparison: None.     Findings:      There is no evidence of acute large vascular territory infarct. No  intra-axial or extra-axial hemorrhage. No visualized mass lesion or mass  effect. The ventricles, cortical sulci and basal cisterns are symmetric  and age appropriate.  Posterior fossa structures are unremarkable. The  scalp and calvarium are intact. Visualized paranasal sinuses  and  mastoids are clear.        Impression:      Impression:    1. No acute intracranial process.  This report was finalized on 11/14/2020 12:48 by Dr Ryder Castaneda, .        Personal review of MRI  DWI show abnormal diffusion restriction but ADC maps are unremarkable.       Impression    1.  Transient  episode of numbness in the perioral area as well as digits 4 and 5 on the left lasting under 12 hours.  Symptoms have completely resolved.  2.  Abnormal MRI of the brain.  Definitely there is no imaging abnormality of the ADC map but certainly there is abnormalities with diffusion-weighted imaging.  You can also see some of these abnormalities on FLAIR.  In the differential would have been a stroke that is older that is not showing up on the ADC map.  But a postictal state could also include this.  The patient is very sharp and with it and alert oriented able to answer questions rapidly.  It is unlikely these are seizures or that she has had a recent seizure but this cannot be excluded  3.  She has risk factors for stroke including diabetes, hypertension, hyperlipidemia, and former smoker    Patient has not had anything to eat or drink all day and feels dehydrated  Plan    UDS  Magnesium  MRI of brain with contrast  EEG stat  Lipid panel  Hemoglobin A1c  Telemetry  Echocardiogram  SCD's  Bedside swallowing eval       I discussed the patients findings and my recommendations with patient, nursing staff and Dr Martin and Cara Alexis MD  11/14/20  16:03 CST

## 2020-11-15 ENCOUNTER — APPOINTMENT (OUTPATIENT)
Dept: CARDIOLOGY | Facility: HOSPITAL | Age: 75
End: 2020-11-15

## 2020-11-15 PROBLEM — I25.10 CAD (CORONARY ARTERY DISEASE): Status: ACTIVE | Noted: 2020-11-15

## 2020-11-15 LAB
ALBUMIN SERPL-MCNC: 3.7 G/DL (ref 3.5–5.2)
ALBUMIN/GLOB SERPL: 1.4 G/DL
ALP SERPL-CCNC: 76 U/L (ref 39–117)
ALT SERPL W P-5'-P-CCNC: 14 U/L (ref 1–33)
ANION GAP SERPL CALCULATED.3IONS-SCNC: 10 MMOL/L (ref 5–15)
AST SERPL-CCNC: 14 U/L (ref 1–32)
BH CV ECHO MEAS - AO MAX PG (FULL): 2.2 MMHG
BH CV ECHO MEAS - AO MAX PG: 6.1 MMHG
BH CV ECHO MEAS - AO MEAN PG (FULL): 2 MMHG
BH CV ECHO MEAS - AO MEAN PG: 4 MMHG
BH CV ECHO MEAS - AO ROOT AREA (BSA CORRECTED): 1.6
BH CV ECHO MEAS - AO ROOT AREA: 8 CM^2
BH CV ECHO MEAS - AO ROOT DIAM: 3.2 CM
BH CV ECHO MEAS - AO V2 MAX: 123 CM/SEC
BH CV ECHO MEAS - AO V2 MEAN: 99.4 CM/SEC
BH CV ECHO MEAS - AO V2 VTI: 33.2 CM
BH CV ECHO MEAS - AVA(I,A): 2.7 CM^2
BH CV ECHO MEAS - AVA(I,D): 2.7 CM^2
BH CV ECHO MEAS - AVA(V,A): 2.7 CM^2
BH CV ECHO MEAS - AVA(V,D): 2.7 CM^2
BH CV ECHO MEAS - BSA(HAYCOCK): 2.1 M^2
BH CV ECHO MEAS - BSA: 2 M^2
BH CV ECHO MEAS - BZI_BMI: 34.9 KILOGRAMS/M^2
BH CV ECHO MEAS - BZI_METRIC_HEIGHT: 165.1 CM
BH CV ECHO MEAS - BZI_METRIC_WEIGHT: 95.3 KG
BH CV ECHO MEAS - EDV(CUBED): 50.7 ML
BH CV ECHO MEAS - EDV(MOD-SP4): 113 ML
BH CV ECHO MEAS - EDV(TEICH): 58.1 ML
BH CV ECHO MEAS - EF(CUBED): 72.4 %
BH CV ECHO MEAS - EF(MOD-SP4): 59.3 %
BH CV ECHO MEAS - EF(TEICH): 65 %
BH CV ECHO MEAS - ESV(CUBED): 14 ML
BH CV ECHO MEAS - ESV(MOD-SP4): 46 ML
BH CV ECHO MEAS - ESV(TEICH): 20.4 ML
BH CV ECHO MEAS - FS: 34.9 %
BH CV ECHO MEAS - IVS/LVPW: 0.99
BH CV ECHO MEAS - IVSD: 1.4 CM
BH CV ECHO MEAS - LA DIMENSION: 3.5 CM
BH CV ECHO MEAS - LA/AO: 1.1
BH CV ECHO MEAS - LAT PEAK E' VEL: 5.8 CM/SEC
BH CV ECHO MEAS - LV DIASTOLIC VOL/BSA (35-75): 56 ML/M^2
BH CV ECHO MEAS - LV MASS(C)D: 197.7 GRAMS
BH CV ECHO MEAS - LV MASS(C)DI: 97.9 GRAMS/M^2
BH CV ECHO MEAS - LV MAX PG: 3.8 MMHG
BH CV ECHO MEAS - LV MEAN PG: 2 MMHG
BH CV ECHO MEAS - LV SYSTOLIC VOL/BSA (12-30): 22.8 ML/M^2
BH CV ECHO MEAS - LV V1 MAX: 97.5 CM/SEC
BH CV ECHO MEAS - LV V1 MEAN: 72.5 CM/SEC
BH CV ECHO MEAS - LV V1 VTI: 25.9 CM
BH CV ECHO MEAS - LVIDD: 3.7 CM
BH CV ECHO MEAS - LVIDS: 2.4 CM
BH CV ECHO MEAS - LVLD AP4: 8.1 CM
BH CV ECHO MEAS - LVLS AP4: 7.2 CM
BH CV ECHO MEAS - LVOT AREA (M): 3.5 CM^2
BH CV ECHO MEAS - LVOT AREA: 3.5 CM^2
BH CV ECHO MEAS - LVOT DIAM: 2.1 CM
BH CV ECHO MEAS - LVPWD: 1.5 CM
BH CV ECHO MEAS - MED PEAK E' VEL: 4.7 CM/SEC
BH CV ECHO MEAS - MV A MAX VEL: 128 CM/SEC
BH CV ECHO MEAS - MV DEC SLOPE: 400 CM/SEC^2
BH CV ECHO MEAS - MV DEC TIME: 0.23 SEC
BH CV ECHO MEAS - MV E MAX VEL: 90.1 CM/SEC
BH CV ECHO MEAS - MV E/A: 0.7
BH CV ECHO MEAS - SI(AO): 132.2 ML/M^2
BH CV ECHO MEAS - SI(CUBED): 18.2 ML/M^2
BH CV ECHO MEAS - SI(LVOT): 44.4 ML/M^2
BH CV ECHO MEAS - SI(MOD-SP4): 33.2 ML/M^2
BH CV ECHO MEAS - SI(TEICH): 18.7 ML/M^2
BH CV ECHO MEAS - SV(AO): 267 ML
BH CV ECHO MEAS - SV(CUBED): 36.7 ML
BH CV ECHO MEAS - SV(LVOT): 89.7 ML
BH CV ECHO MEAS - SV(MOD-SP4): 67 ML
BH CV ECHO MEAS - SV(TEICH): 37.8 ML
BH CV ECHO MEASUREMENTS AVERAGE E/E' RATIO: 17.16
BILIRUB SERPL-MCNC: 0.6 MG/DL (ref 0–1.2)
BUN SERPL-MCNC: 12 MG/DL (ref 8–23)
BUN/CREAT SERPL: 26.1 (ref 7–25)
CALCIUM SPEC-SCNC: 8.9 MG/DL (ref 8.6–10.5)
CHLORIDE SERPL-SCNC: 103 MMOL/L (ref 98–107)
CHOLEST SERPL-MCNC: 118 MG/DL (ref 0–200)
CO2 SERPL-SCNC: 27 MMOL/L (ref 22–29)
CREAT SERPL-MCNC: 0.46 MG/DL (ref 0.57–1)
DEPRECATED RDW RBC AUTO: 42 FL (ref 37–54)
ERYTHROCYTE [DISTWIDTH] IN BLOOD BY AUTOMATED COUNT: 13 % (ref 12.3–15.4)
GFR SERPL CREATININE-BSD FRML MDRD: 133 ML/MIN/1.73
GLOBULIN UR ELPH-MCNC: 2.7 GM/DL
GLUCOSE BLDC GLUCOMTR-MCNC: 114 MG/DL (ref 70–130)
GLUCOSE BLDC GLUCOMTR-MCNC: 137 MG/DL (ref 70–130)
GLUCOSE SERPL-MCNC: 125 MG/DL (ref 65–99)
HBA1C MFR BLD: 6.4 % (ref 4.8–5.6)
HCT VFR BLD AUTO: 34.3 % (ref 34–46.6)
HDLC SERPL-MCNC: 33 MG/DL (ref 40–60)
HGB BLD-MCNC: 11.5 G/DL (ref 12–15.9)
LDLC SERPL CALC-MCNC: 67 MG/DL (ref 0–100)
LDLC/HDLC SERPL: 1.99 {RATIO}
LEFT ATRIUM VOLUME INDEX: 20.4 ML/M2
LEFT ATRIUM VOLUME: 41.3 CM3
MAXIMAL PREDICTED HEART RATE: 146 BPM
MCH RBC QN AUTO: 29.8 PG (ref 26.6–33)
MCHC RBC AUTO-ENTMCNC: 33.5 G/DL (ref 31.5–35.7)
MCV RBC AUTO: 88.9 FL (ref 79–97)
PLATELET # BLD AUTO: 324 10*3/MM3 (ref 140–450)
PMV BLD AUTO: 9.6 FL (ref 6–12)
POTASSIUM SERPL-SCNC: 3.6 MMOL/L (ref 3.5–5.2)
PROT SERPL-MCNC: 6.4 G/DL (ref 6–8.5)
QT INTERVAL: 386 MS
QTC INTERVAL: 436 MS
RBC # BLD AUTO: 3.86 10*6/MM3 (ref 3.77–5.28)
SODIUM SERPL-SCNC: 140 MMOL/L (ref 136–145)
STRESS TARGET HR: 124 BPM
TRIGL SERPL-MCNC: 96 MG/DL (ref 0–150)
TSH SERPL DL<=0.05 MIU/L-ACNC: 1.37 UIU/ML (ref 0.27–4.2)
VLDLC SERPL-MCNC: 18 MG/DL (ref 5–40)
WBC # BLD AUTO: 7.51 10*3/MM3 (ref 3.4–10.8)

## 2020-11-15 PROCEDURE — 25010000002 MAGNESIUM SULFATE 2 GM/50ML SOLUTION: Performed by: PSYCHIATRY & NEUROLOGY

## 2020-11-15 PROCEDURE — 93306 TTE W/DOPPLER COMPLETE: CPT | Performed by: INTERNAL MEDICINE

## 2020-11-15 PROCEDURE — 25010000002 PERFLUTREN 6.52 MG/ML SUSPENSION: Performed by: FAMILY MEDICINE

## 2020-11-15 PROCEDURE — 83036 HEMOGLOBIN GLYCOSYLATED A1C: CPT | Performed by: FAMILY MEDICINE

## 2020-11-15 PROCEDURE — 80053 COMPREHEN METABOLIC PANEL: CPT | Performed by: FAMILY MEDICINE

## 2020-11-15 PROCEDURE — G0378 HOSPITAL OBSERVATION PER HR: HCPCS

## 2020-11-15 PROCEDURE — 82962 GLUCOSE BLOOD TEST: CPT

## 2020-11-15 PROCEDURE — 25010000002 LEVETIRACETAM IN NACL 0.82% 500 MG/100ML SOLUTION: Performed by: PSYCHIATRY & NEUROLOGY

## 2020-11-15 PROCEDURE — 93306 TTE W/DOPPLER COMPLETE: CPT

## 2020-11-15 PROCEDURE — 96361 HYDRATE IV INFUSION ADD-ON: CPT

## 2020-11-15 PROCEDURE — 99214 OFFICE O/P EST MOD 30 MIN: CPT | Performed by: PSYCHIATRY & NEUROLOGY

## 2020-11-15 PROCEDURE — 84443 ASSAY THYROID STIM HORMONE: CPT | Performed by: FAMILY MEDICINE

## 2020-11-15 PROCEDURE — 97161 PT EVAL LOW COMPLEX 20 MIN: CPT | Performed by: PHYSICAL THERAPIST

## 2020-11-15 PROCEDURE — 85027 COMPLETE CBC AUTOMATED: CPT | Performed by: FAMILY MEDICINE

## 2020-11-15 PROCEDURE — 80061 LIPID PANEL: CPT | Performed by: FAMILY MEDICINE

## 2020-11-15 PROCEDURE — 96376 TX/PRO/DX INJ SAME DRUG ADON: CPT

## 2020-11-15 PROCEDURE — 94762 N-INVAS EAR/PLS OXIMTRY CONT: CPT

## 2020-11-15 PROCEDURE — 96365 THER/PROPH/DIAG IV INF INIT: CPT

## 2020-11-15 RX ORDER — CLOPIDOGREL BISULFATE 75 MG/1
75 TABLET ORAL DAILY
Status: DISCONTINUED | OUTPATIENT
Start: 2020-11-15 | End: 2020-11-16 | Stop reason: HOSPADM

## 2020-11-15 RX ORDER — MAGNESIUM SULFATE HEPTAHYDRATE 40 MG/ML
2 INJECTION, SOLUTION INTRAVENOUS ONCE
Status: COMPLETED | OUTPATIENT
Start: 2020-11-15 | End: 2020-11-15

## 2020-11-15 RX ORDER — ACETAMINOPHEN 325 MG/1
650 TABLET ORAL EVERY 4 HOURS PRN
Status: DISCONTINUED | OUTPATIENT
Start: 2020-11-15 | End: 2020-11-16 | Stop reason: HOSPADM

## 2020-11-15 RX ADMIN — PANTOPRAZOLE SODIUM 40 MG: 40 TABLET, DELAYED RELEASE ORAL at 06:34

## 2020-11-15 RX ADMIN — HYDROCHLOROTHIAZIDE 12.5 MG: 25 TABLET ORAL at 09:42

## 2020-11-15 RX ADMIN — CLOPIDOGREL 75 MG: 75 TABLET, FILM COATED ORAL at 15:36

## 2020-11-15 RX ADMIN — LOSARTAN POTASSIUM 100 MG: 50 TABLET, FILM COATED ORAL at 09:42

## 2020-11-15 RX ADMIN — PERFLUTREN 8.48 MG: 6.52 INJECTION, SUSPENSION INTRAVENOUS at 09:06

## 2020-11-15 RX ADMIN — MAGNESIUM SULFATE HEPTAHYDRATE 2 G: 40 INJECTION, SOLUTION INTRAVENOUS at 12:47

## 2020-11-15 RX ADMIN — ACETAMINOPHEN 650 MG: 325 TABLET, FILM COATED ORAL at 09:41

## 2020-11-15 RX ADMIN — FERROUS SULFATE TAB 325 MG (65 MG ELEMENTAL FE) 325 MG: 325 (65 FE) TAB at 09:42

## 2020-11-15 RX ADMIN — LEVETIRACETAM 500 MG: 5 INJECTION INTRAVENOUS at 20:08

## 2020-11-15 RX ADMIN — SODIUM CHLORIDE, PRESERVATIVE FREE 10 ML: 5 INJECTION INTRAVENOUS at 09:43

## 2020-11-15 RX ADMIN — SODIUM CHLORIDE, PRESERVATIVE FREE 10 ML: 5 INJECTION INTRAVENOUS at 20:09

## 2020-11-15 RX ADMIN — ASPIRIN 81 MG: 81 TABLET ORAL at 09:41

## 2020-11-15 NOTE — PLAN OF CARE
Goal Outcome Evaluation:  Plan of Care Reviewed With: patient  Progress: improving  Outcome Summary: A&Ox4. C/o mild headache. Prn Tylenol given with good relief. NIH=0. Denies n/t. MARTÍNEZ. PPP. No neuro changes noted so far this shift. Tele- NS 88. . Room air. SCD. Sleeping in between care. Call light within reach. Will continue to monitor.

## 2020-11-15 NOTE — PROGRESS NOTES
Neurology Progress Note      Date of admission: 11/14/2020 11:28 AM  Date of visit: 11/15/2020    Chief Complaint:  F/u     Subjective     Subjective:  When I entered room patient asleep and snoring.somewhat loudly.  She did have a couple of episodes of what appeared  to be apnea.   EEG showed some hyperirritability--but this was mostly on the left  but no clear seizures. MRI is suggesting stroke verses seizure and patient reporting no seizure like activity/risk  She was started on Keppra as a precaution in view of the MRI.   Medications:  Current Facility-Administered Medications   Medication Dose Route Frequency Provider Last Rate Last Dose   • acetaminophen (TYLENOL) tablet 650 mg  650 mg Oral Q4H PRN Rafi Martin MD   650 mg at 11/15/20 0941   • aspirin EC tablet 81 mg  81 mg Oral Daily Rafi Martin MD   81 mg at 11/15/20 0941   • atorvastatin (LIPITOR) tablet 80 mg  80 mg Oral Nightly Rafi Martin MD       • ferrous sulfate tablet 325 mg  325 mg Oral Daily With Breakfast Rafi Martin MD   325 mg at 11/15/20 0942   • hydroCHLOROthiazide (HYDRODIURIL) tablet 12.5 mg  12.5 mg Oral Daily Rafi Martin MD   12.5 mg at 11/15/20 0942   • levETIRAcetam in NaCl 0.82% (KEPPRA) IVPB 500 mg  500 mg Intravenous Q12H Katy Leach MD 0 mL/hr at 11/15/20 1145     • losartan (COZAAR) tablet 100 mg  100 mg Oral Daily Rafi Martin MD   100 mg at 11/15/20 0942   • ondansetron (ZOFRAN) injection 4 mg  4 mg Intravenous Q6H PRN Rafi Martin MD       • pantoprazole (PROTONIX) EC tablet 40 mg  40 mg Oral QAM Rafi Martin MD   40 mg at 11/15/20 0634   • sodium chloride 0.9 % flush 10 mL  10 mL Intravenous PRN Rafi Martin MD       • sodium chloride 0.9 % flush 10 mL  10 mL Intravenous Q12H Rafi Martin MD   10 mL at 11/15/20 0943   • sodium chloride 0.9 % flush 10 mL  10 mL Intravenous PRN Rafi Martin MD           Review of Systems:   -A 14 point review of systems is completed and is  negative except for snoring    Objective     Objective      Vital Signs  Temp:  [97.4 °F (36.3 °C)-98.4 °F (36.9 °C)] 97.7 °F (36.5 °C)  Heart Rate:  [68-80] 69  Resp:  [16-20] 16  BP: (125-165)/(54-84) 150/56    Physical Exam:    HEENT:  Neck supple  CVS:  Regular rate and rhythm.  No murmurs  Carotid Examination:  No bruits  Lungs:  Clear to auscultation  Abdomen:  Non-tender, Non-distended  Extremities:  No signs of peripheral edema    Neurologic Exam:    -Awake, Alert, Oriented X 3  -No word finding difficulties  -No aphasia  -No dysarthria  -Follows simple and complex commands    Cranial nerves II through XII intact.  CN II:  Visual fields full.  Pupils equally reactive to light  CN III, IV, VI:  Extraocular Muscles full with no signs of nystagmus  CN V:  Facial sensory is symmetric   CN VII:  Facial motor symmetric  CN VIII:  Gross hearing intact bilaterally  CN IX/X:  Palate elevates symmetrically  CN XI:  Shoulder shrug symmetric  CN XII:  Tongue is midline on protrusion     Motor: (strength out of 5:  1= minimal movement, 2 = movement in plane of gravity, 3 = movement against gravity, 4 = movement against some resistance, 5 = full strength)    -Right Upper Ext: Proximal: 5 Distal: 5  -Left Upper Ext: Proximal: 5 Distal: 5    -Right Lower Ext: Proximal: 5 Distal: 5  -Left Lower Ext: Proximal: 5 Distal: 5    DTR:  2+ throughout in all four extremities    Sensory:  -Intact to light touch, pinprick, temperature, pain, and proprioception    Coordination/Gait:  -No ataxia     Results Review:    I reviewed the patient's new clinical results.    Lab Results (last 24 hours)     Procedure Component Value Units Date/Time    POC Glucose Once [[id number]]  (Abnormal) Collected: 11/15/20 1252    Specimen: Blood Updated: 11/15/20 1304     Glucose 137 mg/dL      Comment: : [id number] Jaci Voss ID: [id number]       POC Glucose Once [[id number]]  (Normal) Collected: 11/15/20 0644    Specimen: Blood Updated:  11/15/20 0655     Glucose 114 mg/dL      Comment: : [id number] Quinteroter ID: [id number]       Hemoglobin A1c [[id number]]  (Abnormal) Collected: 11/15/20 0431    Specimen: Blood Updated: 11/15/20 0537     Hemoglobin A1C 6.40 %     Narrative:      Hemoglobin A1C Ranges:    Increased Risk for Diabetes  5.7% to 6.4%  Diabetes                     >= 6.5%  Diabetic Goal                < 7.0%    Lipid Panel [[id number]]  (Abnormal) Collected: 11/15/20 0431    Specimen: Blood Updated: 11/15/20 0522     Total Cholesterol 118 mg/dL      Triglycerides 96 mg/dL      HDL Cholesterol 33 mg/dL      LDL Cholesterol  67 mg/dL      VLDL Cholesterol 18 mg/dL      LDL/HDL Ratio 1.99    Narrative:      Cholesterol Reference Ranges  (U.[place] Department of Health and Human Services ATP III Classifications)    Desirable          <200 mg/dL  Borderline High    200-239 mg/dL  High Risk          >240 mg/dL      Triglyceride Reference Ranges  (U.S. Department of Health and Human Services ATP III Classifications)    Normal           <150 mg/dL  Borderline High  150-199 mg/dL  High             200-499 mg/dL  Very High        >500 mg/dL    HDL Reference Ranges  (U.S. Department of Health and Human Services ATP III Classifcations)    Low     <40 mg/dl (major risk factor for CHD)  High    >60 mg/dl ('negative' risk factor for CHD)        LDL Reference Ranges  (U.S. Department of Health and Human Services ATP III Classifcations)    Optimal          <100 mg/dL  Near Optimal     100-129 mg/dL  Borderline High  130-159 mg/dL  High             160-189 mg/dL  Very High        >189 mg/dL    Comprehensive Metabolic Panel [[id number]]  (Abnormal) Collected: 11/15/20 0431    Specimen: Blood Updated: 11/15/20 0522     Glucose 125 mg/dL      BUN 12 mg/dL      Creatinine 0.46 mg/dL      Sodium 140 mmol/L      Potassium 3.6 mmol/L      Chloride 103 mmol/L      CO2 27.0 mmol/L      Calcium 8.9 mg/dL      Total Protein 6.4 g/dL      Albumin 3.70 g/dL       ALT (SGPT) 14 U/L      AST (SGOT) 14 U/L      Alkaline Phosphatase 76 U/L      Total Bilirubin 0.6 mg/dL      eGFR Non African Amer 133 mL/min/1.73      Globulin 2.7 gm/dL      A/G Ratio 1.4 g/dL      BUN/Creatinine Ratio 26.1     Anion Gap 10.0 mmol/L     Narrative:      GFR Normal >60  Chronic Kidney Disease <60  Kidney Failure <15      TSH [[id number]]  (Normal) Collected: 11/15/20 0431    Specimen: Blood Updated: 11/15/20 0522     TSH 1.370 uIU/mL     CBC (No Diff) [[id number]]  (Abnormal) Collected: 11/15/20 0431    Specimen: Blood Updated: 11/15/20 0449     WBC 7.51 10*3/mm3      RBC 3.86 10*6/mm3      Hemoglobin 11.5 g/dL      Hematocrit 34.3 %      MCV 88.9 fL      MCH 29.8 pg      MCHC 33.5 g/dL      RDW 13.0 %      RDW-SD 42.0 fl      MPV 9.6 fL      Platelets 324 10*3/mm3     COVID PRE-OP / PRE-PROCEDURE SCREENING ORDER (NO ISOLATION) - Swab, Nasal Cavity [[id number]]  (Normal) Collected: 11/14/20 1558    Specimen: Swab from Nasal Cavity Updated: 11/14/20 1654    Narrative:      The following orders were created for panel order COVID PRE-OP / PRE-PROCEDURE SCREENING ORDER (NO ISOLATION) - Swab, Nasal Cavity.  Procedure                               Abnormality         Status                     ---------                               -----------         ------                     COVID-19,[facility]...[[id number]]  Normal              Final result                 Please view results for these tests on the individual orders.    COVID-19,[facility] IN-HOUSE,Nasal Swab No Transport Media 3-4 HR TAT - Swab, Nasal Cavity [[id number]]  (Normal) Collected: 11/14/20 1558    Specimen: Swab from Nasal Cavity Updated: 11/14/20 1654     COVID19 Not Detected    Narrative:      Fact sheet for providers: https://www.fda.gov/media/692125/download     Fact sheet for patients: https://www.fda.gov/media/092098/download    Urine Drug Screen - Urine, Clean Catch [[id number]]  (Normal) Collected: 11/14/20 1556    Specimen: Urine,  Clean Catch Updated: 11/14/20 1622     THC, Screen, Urine Negative     Phencyclidine (PCP), Urine Negative     Cocaine Screen, Urine Negative     Methamphetamine, Ur Negative     Opiate Screen Negative     Amphetamine Screen, Urine Negative     Benzodiazepine Screen, Urine Negative     Tricyclic Antidepressants Screen Negative     Methadone Screen, Urine Negative     Barbiturates Screen, Urine Negative     Oxycodone Screen, Urine Negative     Propoxyphene Screen Negative     Buprenorphine, Screen, Urine Negative    Narrative:      Cutoff For Drugs Screened:    Amphetamines               500 ng/ml  Barbiturates               200 ng/ml  Benzodiazepines            150 ng/ml  Cocaine                    150 ng/ml  Methadone                  200 ng/ml  Opiates                    100 ng/ml  Phencyclidine               25 ng/ml  THC                            50 ng/ml  Methamphetamine            500 ng/ml  Tricyclic Antidepressants  300 ng/ml  Oxycodone                  100 ng/ml  Propoxyphene               300 ng/ml  Buprenorphine               10 ng/ml    The normal value for all drugs tested is negative. This report includes unconfirmed screening results, with the cutoff values listed, to be used for medical treatment purposes only.  Unconfirmed results must not be used for non-medical purposes such as employment or legal testing.  Clinical consideration should be applied to any drug of abuse test, particularly when unconfirmed results are used.      Magnesium [[id number]]  (Normal) Collected: 11/14/20 1026    Specimen: Blood Updated: 11/14/20 1540     Magnesium 1.8 mg/dL         Imaging Results (Last 24 Hours)     Procedure Component Value Units Date/Time    MRI Brain With Contrast [[id number]] Collected: 11/14/20 1851     Updated: 11/14/20 1859    Narrative:      EXAMINATION: MRI of the brain with contrast 11/14/2020     HISTORY: Abnormal MRI raising question of seizure     FINDINGS: Postcontrast imaging was requested  for further evaluation of a  focus of restricted diffusion within the mesial right temporal lobe and  specifically involving the right hippocampus on routine earlier MR  imaging.. Postcontrast imaging is obtained. FLAIR sagittal and axial  sequences as well as high-resolution imaging through the temporal lobes  are also obtained. There is atrophy of the brain with prominence of the  subarachnoid spaces and ventricular enlargement. Multiple foci of T2  abnormalities are are present involving the periventricular and higher  white matter tracts suggesting small vessel ischemic change. There is  asymmetric increased T2 signal on FLAIR sequencing within the mesial  right temporal lobe and specifically in the region of the right  hippocampus. This is confirmed on thin section high-resolution T2  imaging in the coronal plane. This does correspond to the area of  restricted diffusion and abnormal FLAIR signal on routine imaging  obtained earlier today. There is no associated mass effect or abnormal  contrast enhancement. I would favor that this finding is related to the  patient's postictal state. FLAIR and diffusion abnormalities can be  demonstrated in the immediate postictal time frame. Acute infarct in  this distribution is considered less likely. Short interval follow-up  imaging will be recommended.       Impression:      1.. As demonstrated on routine imaging through the brain there is again  asymmetric T2 signal involving the mesial right temporal lobe and  specifically the hippocampus. There is no associated abnormal contrast  enhancement. A neoplasm is considered unlikely and I suspect this  represents increased T2 signal and diffusion restriction related to the  patient's postictal state. Acute ischemia/infarct is considered less  likely. Short interval follow-up imaging will be recommended to assure  stability and/or likely resolution of this finding.  2. There is mild atrophy and small vessel disease. No foci  of abnormal  contrast enhancement are demonstrated.  This report was finalized on 11/14/2020 18:56 by Dr. Emmett Parson MD.    MRI Brain Without Contrast [[id number]] Collected: 11/14/20 1452     Updated: 11/14/20 1512    Narrative:      Indication: numbness around mouth and small fingers to the left hand        Technique: Multisequence, multiplanar MRI of the brain without contrast.     Comparison: CT scan dated 11/14/2020     Findings:      Abnormal diffusion signal hyperintensity diffusely along the right  hippocampus. Associated T2 FLAIR hyperintensity identified along the  left hippocampus. No T2 FLAIR abnormality identified at the temporal  pole. The contralateral mesial temporal lobe is unremarkable. Underlying  mild chronic small vessel ischemic change. No intra-axial or extra-axial  hemorrhage. No intracranial mass lesion or visualized mass effect. The  ventricles, cortical sulci and basal cisterns are symmetric and age  appropriate. Posterior fossa structures are unremarkable. Pituitary  gland and sella are unremarkable. The major intracranial flow-voids are  preserved. Orbital contents are unremarkable. Chronic mucosal thickening  along the floors of the maxillary sinuses. Mastoid air cells are clear.  Nonspecific T2 FLAIR hyperintensity left of midline in the frontal bone.  Inner and outer table of the cortical bone appears intact and there is  no lytic lesion in this area identified on today's CT scan.        Impression:      Impression:     1. Abnormal diffusion restriction identified diffusely along the right  hippocampus. Favor post ictal change over hippocampal infarct given the  diffuse nature of the signal abnormality along the hippocampus as  compared with a focal involvement which is more typical of infarct.  2. I do not see an obvious intra-axial mass on this exam. Postcontrast  imaging could be considered to evaluate for enhancing lesion or  suspicious pachymeningeal enhancement.      Findings and recommendations were discussed with MATIAS HENSLEY on  11/14/2020 at 3:09 PM CST.  This report was finalized on 11/14/2020 15:09 by Dr Ryder Castaneda, .        Telemetry Sinus 63 to 96   Assessment/Plan     Hospital Problem List      Stroke-like symptoms    Impression:  1. TIA  2. Abnormal MRI raising the question of seizure when patient denying any seizures or any history to suggest this as a possibility.  However EEG had some bilateral temporal slowing at times but that's not unremarkable for age  No clear seizurogenic potentials.  /there was electrode artifact on T4 which is the area of interest  3. Witnessed apnea and somewhat loud snoring. Concern for obstructive sleep apnea and this could cause seizures especially if C02 high.   4. Hyperlipidemia with LDL of 67 and goal of < 70.  5. DM with good control and hemoglobin A1C of 6.4 and goal of < 7.0    Plan:  · Continue Keppra for now  · Complete stroke work up  · Plavix added to aspirin for now--max of 3 weeks for dual antiplatelet  · Await echo  · Will need a follow up MRI in a few weeks.(3 to 6 weeks)   · Will need to repeat EEG   · Obtain overnight sleep oximetry with HOB flat        Katy Alexis MD  11/15/20  13:59 CST

## 2020-11-15 NOTE — PROGRESS NOTES
[facility] Medicine Services  INPATIENT PROGRESS NOTE    Length of Stay: 0  Date of Admission: 11/14/2020  Primary Care Physician: Car Landeros MD    Subjective   Chief Complaint: Confusion.    HPI   Confusion is much improved.  Ongoing work-up by neurology.  Patient denies any chest pain.  Patient having shortness of breath.  Strokelike symptoms since resolved.  MRI shows possible seizure.    Review of Systems   Constitutional: Positive for activity change, appetite change and fatigue. Negative for chills and fever.   HENT: Negative for hearing loss, nosebleeds, tinnitus and trouble swallowing.    Eyes: Negative for visual disturbance.   Respiratory: Negative for cough, chest tightness, shortness of breath and wheezing.    Cardiovascular: Negative for chest pain, palpitations and leg swelling.   Gastrointestinal: Negative for abdominal distention, abdominal pain, blood in stool, constipation, diarrhea, nausea and vomiting.   Endocrine: Negative for cold intolerance, heat intolerance, polydipsia, polyphagia and polyuria.   Genitourinary: Negative for decreased urine volume, difficulty urinating, dysuria, flank pain, frequency and hematuria.   Musculoskeletal: Positive for arthralgias, gait problem and myalgias. Negative for joint swelling.   Skin: Negative for rash.   Allergic/Immunologic: Negative for immunocompromised state.   Neurological: Positive for weakness. Negative for dizziness, syncope, light-headedness and headaches.   Hematological: Negative for adenopathy. Does not bruise/bleed easily.   Psychiatric/Behavioral: Positive for confusion resolved. Negative for sleep disturbance. The patient is not nervous/anxious.      All pertinent negatives and positives are as above. All other systems have been reviewed and are negative unless otherwise stated.     Objective    Temp:  [97.4 °F (36.3 °C)-98.4 °F (36.9 °C)] 97.7 °F (36.5 °C)  Heart Rate:  [68-80] 69  Resp:   [16-20] 16  BP: (125-165)/(54-84) 150/56    Intake/Output Summary (Last 24 hours) at 11/15/2020 1536  Last data filed at 11/15/2020 0638  Gross per 24 hour   Intake 340 ml   Output 800 ml   Net -460 ml     Physical Exam  Vitals signs and nursing note reviewed.   Constitutional:       Appearance: She is well-developed.   HENT:      Head: Normocephalic and atraumatic.   Eyes:      Conjunctiva/sclera: Conjunctivae normal.      Pupils: Pupils are equal, round, and reactive to light.   Neck:      Musculoskeletal: Neck supple.      Vascular: No JVD.   Cardiovascular:      Rate and Rhythm: Normal rate and regular rhythm.      Heart sounds: Normal heart sounds. No murmur. No friction rub. No gallop.    Pulmonary:      Effort: Pulmonary effort is normal. No respiratory distress.      Breath sounds: Normal breath sounds. No wheezing or rales.   Chest:      Chest wall: No tenderness.   Abdominal:      General: Bowel sounds are normal. There is no distension.  Obesity.     Palpations: Abdomen is soft.      Tenderness: There is no abdominal tenderness. There is no guarding or rebound.   Musculoskeletal: Normal range of motion.         General: No tenderness or deformity.   Skin:     General: Skin is warm and dry.      Capillary Refill: Capillary refill takes 2 to 3 seconds.      Findings: No rash.   Neurological:      Mental Status: She is alert and oriented to person, place, and time.      Cranial Nerves: No cranial nerve deficit.      Motor: No abnormal muscle tone.      Deep Tendon Reflexes: Reflexes normal.      Comments: Cranial nerve 2 through 12 grossly intact.  Negative pronator drift.  Negative finger touch.  Strength is 5 out of 5 symmetrical.   Psychiatric:         Mood and Affect: Mood normal.         Behavior: Behavior normal.         Thought Content: Thought content normal.         Judgment: Judgment normal.   Results Review:  Lab Results (last 24 hours)     Procedure Component Value Units Date/Time    POC Glucose  Once [[id number]]  (Abnormal) Collected: 11/15/20 1252    Specimen: Blood Updated: 11/15/20 1304     Glucose 137 mg/dL      Comment: : [id number] Jaci Voss ID: [id number]       POC Glucose Once [[id number]]  (Normal) Collected: 11/15/20 0644    Specimen: Blood Updated: 11/15/20 0655     Glucose 114 mg/dL      Comment: : [id number] Susi Alamo ID: [id number]       Hemoglobin A1c [[id number]]  (Abnormal) Collected: 11/15/20 0431    Specimen: Blood Updated: 11/15/20 0537     Hemoglobin A1C 6.40 %     Narrative:      Hemoglobin A1C Ranges:    Increased Risk for Diabetes  5.7% to 6.4%  Diabetes                     >= 6.5%  Diabetic Goal                < 7.0%    Lipid Panel [[id number]]  (Abnormal) Collected: 11/15/20 0431    Specimen: Blood Updated: 11/15/20 0522     Total Cholesterol 118 mg/dL      Triglycerides 96 mg/dL      HDL Cholesterol 33 mg/dL      LDL Cholesterol  67 mg/dL      VLDL Cholesterol 18 mg/dL      LDL/HDL Ratio 1.99    Narrative:      Cholesterol Reference Ranges  (U.S. Department of Health and Human Services ATP III Classifications)    Desirable          <200 mg/dL  Borderline High    200-239 mg/dL  High Risk          >240 mg/dL      Triglyceride Reference Ranges  (U.S. Department of Health and Human Services ATP III Classifications)    Normal           <150 mg/dL  Borderline High  150-199 mg/dL  High             200-499 mg/dL  Very High        >500 mg/dL    HDL Reference Ranges  (U.S. Department of Health and Human Services ATP III Classifcations)    Low     <40 mg/dl (major risk factor for CHD)  High    >60 mg/dl ('negative' risk factor for CHD)        LDL Reference Ranges  (U.S. Department of Health and Human Services ATP III Classifcations)    Optimal          <100 mg/dL  Near Optimal     100-129 mg/dL  Borderline High  130-159 mg/dL  High             160-189 mg/dL  Very High        >189 mg/dL    Comprehensive Metabolic Panel [[id number]]  (Abnormal) Collected: 11/15/20 0431     Specimen: Blood Updated: 11/15/20 0522     Glucose 125 mg/dL      BUN 12 mg/dL      Creatinine 0.46 mg/dL      Sodium 140 mmol/L      Potassium 3.6 mmol/L      Chloride 103 mmol/L      CO2 27.0 mmol/L      Calcium 8.9 mg/dL      Total Protein 6.4 g/dL      Albumin 3.70 g/dL      ALT (SGPT) 14 U/L      AST (SGOT) 14 U/L      Alkaline Phosphatase 76 U/L      Total Bilirubin 0.6 mg/dL      eGFR Non African Amer 133 mL/min/1.73      Globulin 2.7 gm/dL      A/G Ratio 1.4 g/dL      BUN/Creatinine Ratio 26.1     Anion Gap 10.0 mmol/L     Narrative:      GFR Normal >60  Chronic Kidney Disease <60  Kidney Failure <15      TSH [[id number]]  (Normal) Collected: 11/15/20 0431    Specimen: Blood Updated: 11/15/20 0522     TSH 1.370 uIU/mL     CBC (No Diff) [[id number]]  (Abnormal) Collected: 11/15/20 0431    Specimen: Blood Updated: 11/15/20 0449     WBC 7.51 10*3/mm3      RBC 3.86 10*6/mm3      Hemoglobin 11.5 g/dL      Hematocrit 34.3 %      MCV 88.9 fL      MCH 29.8 pg      MCHC 33.5 g/dL      RDW 13.0 %      RDW-SD 42.0 fl      MPV 9.6 fL      Platelets 324 10*3/mm3     COVID PRE-OP / PRE-PROCEDURE SCREENING ORDER (NO ISOLATION) - Swab, Nasal Cavity [[id number]]  (Normal) Collected: 11/14/20 1558    Specimen: Swab from Nasal Cavity Updated: 11/14/20 9934    Narrative:      The following orders were created for panel order COVID PRE-OP / PRE-PROCEDURE SCREENING ORDER (NO ISOLATION) - Swab, Nasal Cavity.  Procedure                               Abnormality         Status                     ---------                               -----------         ------                     COVID-19,[facility]...[[id number]]  Normal              Final result                 Please view results for these tests on the individual orders.    COVID-19,[facility] IN-HOUSE,Nasal Swab No Transport Media 3-4 HR TAT - Swab, Nasal Cavity [[id number]]  (Normal) Collected: 11/14/20 1558    Specimen: Swab from Nasal Cavity Updated: 11/14/20 2989      COVID19 Not Detected    Narrative:      Fact sheet for providers: https://www.fda.gov/media/446364/download     Fact sheet for patients: https://www.fda.gov/media/073435/download    Urine Drug Screen - Urine, Clean Catch [[id number]]  (Normal) Collected: 11/14/20 1556    Specimen: Urine, Clean Catch Updated: 11/14/20 1622     THC, Screen, Urine Negative     Phencyclidine (PCP), Urine Negative     Cocaine Screen, Urine Negative     Methamphetamine, Ur Negative     Opiate Screen Negative     Amphetamine Screen, Urine Negative     Benzodiazepine Screen, Urine Negative     Tricyclic Antidepressants Screen Negative     Methadone Screen, Urine Negative     Barbiturates Screen, Urine Negative     Oxycodone Screen, Urine Negative     Propoxyphene Screen Negative     Buprenorphine, Screen, Urine Negative    Narrative:      Cutoff For Drugs Screened:    Amphetamines               500 ng/ml  Barbiturates               200 ng/ml  Benzodiazepines            150 ng/ml  Cocaine                    150 ng/ml  Methadone                  200 ng/ml  Opiates                    100 ng/ml  Phencyclidine               25 ng/ml  THC                            50 ng/ml  Methamphetamine            500 ng/ml  Tricyclic Antidepressants  300 ng/ml  Oxycodone                  100 ng/ml  Propoxyphene               300 ng/ml  Buprenorphine               10 ng/ml    The normal value for all drugs tested is negative. This report includes unconfirmed screening results, with the cutoff values listed, to be used for medical treatment purposes only.  Unconfirmed results must not be used for non-medical purposes such as employment or legal testing.  Clinical consideration should be applied to any drug of abuse test, particularly when unconfirmed results are used.      Magnesium [[id number]]  (Normal) Collected: 11/14/20 1026    Specimen: Blood Updated: 11/14/20 1540     Magnesium 1.8 mg/dL            Cultures:  No results found for: BLOODCX, URINECX,  WOUNDCX, MRSACX, RESPCX, STOOLCX    Radiology Data:    Imaging Results (Last 24 Hours)     Procedure Component Value Units Date/Time    MRI Brain With Contrast [[id number]] Collected: 11/14/20 1851     Updated: 11/14/20 1859    Narrative:      EXAMINATION: MRI of the brain with contrast 11/14/2020     HISTORY: Abnormal MRI raising question of seizure     FINDINGS: Postcontrast imaging was requested for further evaluation of a  focus of restricted diffusion within the mesial right temporal lobe and  specifically involving the right hippocampus on routine earlier MR  imaging.. Postcontrast imaging is obtained. FLAIR sagittal and axial  sequences as well as high-resolution imaging through the temporal lobes  are also obtained. There is atrophy of the brain with prominence of the  subarachnoid spaces and ventricular enlargement. Multiple foci of T2  abnormalities are are present involving the periventricular and higher  white matter tracts suggesting small vessel ischemic change. There is  asymmetric increased T2 signal on FLAIR sequencing within the mesial  right temporal lobe and specifically in the region of the right  hippocampus. This is confirmed on thin section high-resolution T2  imaging in the coronal plane. This does correspond to the area of  restricted diffusion and abnormal FLAIR signal on routine imaging  obtained earlier today. There is no associated mass effect or abnormal  contrast enhancement. I would favor that this finding is related to the  patient's postictal state. FLAIR and diffusion abnormalities can be  demonstrated in the immediate postictal time frame. Acute infarct in  this distribution is considered less likely. Short interval follow-up  imaging will be recommended.       Impression:      1.. As demonstrated on routine imaging through the brain there is again  asymmetric T2 signal involving the mesial right temporal lobe and  specifically the hippocampus. There is no associated abnormal  contrast  enhancement. A neoplasm is considered unlikely and I suspect this  represents increased T2 signal and diffusion restriction related to the  patient's postictal state. Acute ischemia/infarct is considered less  likely. Short interval follow-up imaging will be recommended to assure  stability and/or likely resolution of this finding.  2. There is mild atrophy and small vessel disease. No foci of abnormal  contrast enhancement are demonstrated.  This report was finalized on 11/14/2020 18:56 by Dr. Emmett Parson MD.          Allergies   Allergen Reactions   • Demerol [Meperidine] Nausea And Vomiting   • Morphine And Related Nausea And Vomiting   • Stadol [Butorphanol] Other (See Comments)     Pt does not recall   • Talwin [Pentazocine] Mental Status Change   • Adhesive Tape Rash   • Prednisone Other (See Comments)     Pt does not recall       Scheduled meds:   aspirin, 81 mg, Oral, Daily  atorvastatin, 80 mg, Oral, Nightly  clopidogrel, 75 mg, Oral, Daily  ferrous sulfate, 325 mg, Oral, Daily With Breakfast  hydroCHLOROthiazide, 12.5 mg, Oral, Daily  levETIRAcetam, 500 mg, Intravenous, Q12H  losartan, 100 mg, Oral, Daily  pantoprazole, 40 mg, Oral, QAM  sodium chloride, 10 mL, Intravenous, Q12H        PRN meds:  •  acetaminophen  •  ondansetron  •  sodium chloride  •  sodium chloride    Assessment/Plan       Stroke-like symptoms      Plan:     Strokelike symptoms vs seizure.  TIA protocol.  Neurology consult.  Continue Keppra.  Continue aspirin and Plavix.  Plan for overnight sleep oximetry with head of bed flat, repeat EEG while on Keppra, need follow-up MRI in a few weeks-3 to 6 weeks.  CT scan head-no acute process.  MRI of the head-Abnormal diffusion restriction identified diffusely along the right hippocampus- favor post ictal change over hippocampal infarct given the diffuse nature of the signal abnormality along the hippocampus as compared with a focal involvement which is more typical of  infarct.  EEG pending.  Echocardiogram-ejection fraction 56 6%, moderate concentric hypertrophy, diastolic dysfunction grade 1A.  Carotid duplex pending.     Chronic pain.  Tylenol as needed.     CAD/hypertension.  Continue aspirin.  Continue hydrochlorthiazide.  Continue Cozaar.     Reflux.  Continue Pepcid.     Anemia.  Continue iron sulfate.    Recently.  BMI is 34.  Diet exercise discussed with patient.     Covid-19-negative.    Discharge Plannin-3 days.  Ongoing work-up by neurology.    Electronically signed by Rafi Martin MD, 11/15/20, 3:36 PM CST.

## 2020-11-15 NOTE — THERAPY EVALUATION
Patient Name: Pascale Jimenez  : 1945    MRN: [id number]                              Today's Date: 11/15/2020       Admit Date: 2020    Visit Dx:     ICD-10-CM ICD-9-CM   1. Stroke-like symptoms  R29.90 781.99   2. Abnormal MRI  R93.89 793.99   3. Impaired mobility  Z74.09 799.89     Patient Active Problem List   Diagnosis   • Compression fracture of body of thoracic vertebra (CMS/HCC)   • BMI 35.0-35.9,adult   • Former smoker   • Acute bilateral low back pain without sciatica   • Stroke-like symptoms     Past Medical History:   Diagnosis Date   • Colon polyp    • Diabetes mellitus (CMS/HCC)    • Diverticulitis    • Duodenal ulcer    • GERD (gastroesophageal reflux disease)    • Hemorrhoids    • Histoplasmosis    • Hyperlipidemia    • Hypertension      Past Surgical History:   Procedure Laterality Date   • APPENDECTOMY     • CHOLECYSTECTOMY     • COLONOSCOPY  2014   • ENDOSCOPY  2015   • HYSTERECTOMY     • REPLACEMENT TOTAL KNEE     • SPINE SURGERY     • TOTAL HIP ARTHROPLASTY Bilateral     x 2   • TUBAL ABDOMINAL LIGATION       General Information     Row Name 11/15/20 0759          Physical Therapy Time and Intention    Document Type  evaluation presented with numbness in L 4th and 5th fingers, TIA vs seizure  -MS     Mode of Treatment  physical therapy  -MS     Row Name 11/15/20 0759          General Information    Patient Profile Reviewed  yes  -MS     Prior Level of Function  independent:;ADL's;community mobility;all household mobility  -MS     Existing Precautions/Restrictions  fall  -MS     Barriers to Rehab  none identified  -MS     Row Name 11/15/20 0759          Living Environment    Lives With  alone  -MS     Row Name 11/15/20 0759          Home Main Entrance    Number of Stairs, Main Entrance  six  -MS     Stair Railings, Main Entrance  railings on both sides of stairs  -MS     Row Name 11/15/20 0759          Stairs Within Home, Primary    Stairs, Within Home, Primary  12 into  the basement  -MS     Number of Stairs, Within Home, Primary  other (see comments)  -MS     Row Name 11/15/20 0759          Cognition    Orientation Status (Cognition)  oriented x 4  -MS     Row Name 11/15/20 0759          Safety Issues, Functional Mobility    Safety Issues Affecting Function (Mobility)  awareness of need for assistance;insight into deficits/self-awareness  -MS     Impairments Affecting Function (Mobility)  balance;endurance/activity tolerance;strength  -MS       User Key  (r) = Recorded By, (t) = Taken By, (c) = Cosigned By    Initials Name Provider Type    Marilu Loza R, PT, DPT, NCS Physical Therapist        Mobility     Row Name 11/15/20 0759          Bed Mobility    Comment (Bed Mobility)  pt already sitting EOB  -MS     Row Name 11/15/20 0759          Sit-Stand Transfer    Sit-Stand [place] (Transfers)  supervision  -MS     Assistive Device (Sit-Stand Transfers)  -- pt reaches out for support  -MS     Row Name 11/15/20 0759          Gait/Stairs (Locomotion)    [place] Level (Gait)  minimum assist (75% patient effort);verbal cues;nonverbal cues (demo/gesture)  -MS     Assistive Device (Gait)  -- HHA x2  -MS     Distance in Feet (Gait)  50ft using 2 UE support, pt refuses use of walker. She demonstrates a forward flexed posture and would benefit from use of walker  -MS       User Key  (r) = Recorded By, (t) = Taken By, (c) = Cosigned By    Initials Name Provider Type    Marilu Loza FATOU, PT, DPT, NCS Physical Therapist        Obj/Interventions     Row Name 11/15/20 0759          Range of Motion Comprehensive    General Range of Motion  no range of motion deficits identified  -MS     Row Name 11/15/20 0759          Strength Comprehensive (MMT)    Comment, General Manual Muscle Testing (MMT) Assessment  L hip flexion, quad, and hamstring 4+/5. all others 5/5  -MS     Row Name 11/15/20 0759          Motor Skills    Motor Skills  coordination  -MS     Coordination  WNL;finger to  nose;heel to shin  -MS     Row Name 11/15/20 0759          Balance    Balance Assessment  sitting static balance;standing static balance;standing dynamic balance  -MS     Static Sitting Balance  WNL;sitting, edge of bed  -MS     Dynamic Sitting Balance  WNL  -MS     Static Standing Balance  mild impairment  -MS     Dynamic Standing Balance  mild impairment  -MS     Row Name 11/15/20 0759          Sensory Assessment (Somatosensory)    Sensory Assessment (Somatosensory)  sensation intact  -MS       User Key  (r) = Recorded By, (t) = Taken By, (c) = Cosigned By    Initials Name Provider Type    MS Salbador Marilu R, PT, DPT, NCS Physical Therapist        Goals/Plan     Row Name 11/15/20 0759          Bed Mobility Goal 1 (PT)    Activity/Assistive Device (Bed Mobility Goal 1, PT)  bed mobility activities, all  -MS     [place] Level/Cues Needed (Bed Mobility Goal 1, PT)  independent  -MS     Time Frame (Bed Mobility Goal 1, PT)  long term goal (LTG);by discharge  -MS     Progress/Outcomes (Bed Mobility Goal 1, PT)  goal ongoing  -MS     Row Name 11/15/20 [facility]9          Transfer Goal 1 (PT)    Activity/Assistive Device (Transfer Goal 1, PT)  sit-to-stand/stand-to-sit;bed-to-chair/chair-to-bed  -MS     [place] Level/Cues Needed (Transfer Goal 1, PT)  independent  -MS     Time Frame (Transfer Goal 1, PT)  long term goal (LTG);by discharge  -MS     Progress/Outcome (Transfer Goal 1, PT)  goal ongoing  -MS     Row Name 11/15/20 0759          Gait Training Goal 1 (PT)    Activity/Assistive Device (Gait Training Goal 1, PT)  gait (walking locomotion);assistive device use;backward stepping;decrease fall risk;forward stepping;improve balance and speed;increase endurance/gait distance;walker, rolling  -MS     [place] Level (Gait Training Goal 1, PT)  modified independence  -MS     Distance (Gait Training Goal 1, PT)  200ft  -MS     Time Frame (Gait Training Goal 1, PT)  long term goal (LTG);by discharge  -MS      Progress/Outcome (Gait Training Goal 1, PT)  goal ongoing  -MS     Row Name 11/15/20 0759          Stairs Goal 1 (PT)    Activity/Assistive Device (Stairs Goal 1, PT)  ascending stairs;descending stairs;step-to-step;using handrail, left;using handrail, right  -MS     [place] Level/Cues Needed (Stairs Goal 1, PT)  modified independence  -MS     Number of Stairs (Stairs Goal 1, PT)  6  -MS     Time Frame (Stairs Goal 1, PT)  long term goal (LTG);by discharge  -MS     Progress/Outcome (Stairs Goal 1, PT)  goal ongoing  -MS       User Key  (r) = Recorded By, (t) = Taken By, (c) = Cosigned By    Initials Name Provider Type    Marilu Loza, PT, DPT, NCS Physical Therapist        Clinical Impression     Row Name 11/15/20 0801          Pain    Additional Documentation  Pain Scale: Numbers Pre/Post-Treatment (Group)  -MS     Row Name 11/15/20 0801          Pain Scale: Numbers Pre/Post-Treatment    Pretreatment Pain Rating  3/10  -MS     Posttreatment Pain Rating  3/10  -MS     Pain Location - Orientation  posterior  -MS     Pain Intervention(s)  Repositioned  -MS     Row Name 11/15/20 0801          Plan of Care Review    Plan of Care Reviewed With  patient  -MS     Progress  improving  -MS     Outcome Summary  PT evaluation completed. The patient presents alert and oriented x4 sitting EOB. She demonstrates mild L LE weakness of 4+/5 and no sensation deficits or coordination deficits. She reports not feeling well, but cannot describe what feels bad. She is unsteady on her feet with a forward flexed posture and required 2 UE support to maintain her balance. She refused the use of a walker, however she will benefit from the use of a walker at this time. She lives alone and has multiple stairs to navigate into and in her home. She will benefit from skilled PT services to work on gait safety and education. Recommend discharge home with assist and  home health.  -MS     Row Name 11/15/20 0801          Therapy  Assessment/Plan (PT)    Patient/Family Therapy Goals Statement (PT)  go home and care fore herself  -MS     Rehab Potential (PT)  good, to achieve stated therapy goals  -MS     Criteria for Skilled Interventions Met (PT)  yes;skilled treatment is necessary  -MS     Predicted Duration of Therapy Intervention (PT)  until discharge  -MS     Row Name 11/15/20 0801          Positioning and Restraints    Post Treatment Position  chair  -MS     In Chair  notified nsg;sitting;call light within reach;encouraged to call for assist  -MS       User Key  (r) = Recorded By, (t) = Taken By, (c) = Cosigned By    Initials Name Provider Type    MS Marilu Siu, PT, DPT, NCS Physical Therapist        Outcome Measures     Row Name 11/15/20 0759          Modified Cher Scale    Modified [place] Scale  4 - Moderately severe disability.  Unable to walk without assistance, and unable to attend to own bodily needs without assistance.  -MS     Row Name 11/15/20 0759          Functional Assessment    Outcome Measure Options  Modified Cher  -MS       User Key  (r) = Recorded By, (t) = Taken By, (c) = Cosigned By    Initials Name Provider Type    MS Marilu Siu, PT, DPT, NCS Physical Therapist        Physical Therapy Education                 Title: PT OT SLP Therapies (In Progress)     Topic: Physical Therapy (In Progress)     Point: Mobility training (Done)     Learning Progress Summary           Patient Acceptance, E, VU by MS at 11/15/2020 0831    Comment: role of PT in her care, safety during gait                   Point: Home exercise program (Not Started)     Learner Progress:  Not documented in this visit.          Point: Body mechanics (Not Started)     Learner Progress:  Not documented in this visit.          Point: Precautions (Not Started)     Learner Progress:  Not documented in this visit.                      User Key     Initials Effective Dates Name Provider Type Discipline    MS 06/19/18 -  Marilu Siu, PT,  DPT, LILY Physical Therapist PT              PT Recommendation and Plan  Planned Therapy Interventions (PT): balance training, bed mobility training, gait training, patient/family education, neuromuscular re-education, stair training, strengthening, transfer training  Plan of Care Reviewed With: patient  Progress: improving  Outcome Summary: PT evaluation completed. The patient presents alert and oriented x4 sitting EOB. She demonstrates mild L LE weakness of 4+/5 and no sensation deficits or coordination deficits. She reports not feeling well, but cannot describe what feels bad. She is unsteady on her feet with a forward flexed posture and required 2 UE support to maintain her balance. She refused the use of a walker, however she will benefit from the use of a walker at this time. She lives alone and has multiple stairs to navigate into and in her home. She will benefit from skilled PT services to work on gait safety and education. Recommend discharge home with assist and  home health.     Time Calculation:   PT Charges     Row Name 11/15/20 0759             Time Calculation    Start Time  0750  -MS      Stop Time  0828  -MS      Time Calculation (min)  38 min  -MS      PT Received On  11/15/20  -MS      PT Goal Re-Cert Due Date  11/25/20  -MS        User Key  (r) = Recorded By, (t) = Taken By, (c) = Cosigned By    Initials Name Provider Type    Marilu Loza, PT, DPT, NCS Physical Therapist        Therapy Charges for Today     Code Description Service Date Service Provider Modifiers Qty    [id number]  PT EVAL LOW COMPLEXITY 3 11/15/2020 Marilu Siu PT, DPT, NCS GP 1          PT G-Codes  Outcome Measure Options: Modified Cher  Modified [place] Scale: 4 - Moderately severe disability.  Unable to walk without assistance, and unable to attend to own bodily needs without assistance.    Marilu Siu, PT, DPT, NCS  11/15/2020

## 2020-11-15 NOTE — PROGRESS NOTES
Discharge Planning Assessment  [facility]     Patient Name: Pascale Jimenez  MRN: [id number]  Today's Date: 11/15/2020    Admit Date: 11/14/2020    Discharge Needs Assessment     Row Name 11/15/20 0937       Living Environment    Lives With  alone    Current Living Arrangements  home/apartment/condo    Primary Care Provided by  self    Provides Primary Care For  no one    Family Caregiver if Needed  child(jeanie), adult    Quality of Family Relationships  helpful;involved;supportive    Able to Return to Prior Arrangements  yes       Resource/Environmental Concerns    Resource/Environmental Concerns  none    Transportation Concerns  car, none       Transition Planning    Patient/Family Anticipates Transition to  home;home with help/services    Patient/Family Anticipated Services at Transition  none    Transportation Anticipated  family or friend will provide       Discharge Needs Assessment    Readmission Within the Last 30 Days  no previous admission in last 30 days    Equipment Currently Used at Home  none    Concerns to be Addressed  no discharge needs identified;denies needs/concerns at this time    Anticipated Changes Related to Illness  none    Equipment Needed After Discharge  none    Discharge Coordination/Progress  Pt has RX coverage and a PCP. Pt lived alone and was independent prior to admission. PT evaluated pt this morning stating pt will need HH and possibly a walker at discharge. Pt has refused walker. SW will follow for possible HH needs.        Discharge Plan    No documentation.       Continued Care and Services - Admitted Since 11/14/2020    Coordination has not been started for this encounter.         Demographic Summary    No documentation.       Functional Status    No documentation.       Psychosocial    No documentation.       Abuse/Neglect    No documentation.       Legal    No documentation.       Substance Abuse    No documentation.       Patient Forms    No documentation.           Kaitlynn MORILLO  Yiss[place]

## 2020-11-15 NOTE — PLAN OF CARE
Goal Outcome Evaluation:  Plan of Care Reviewed With: patient  Progress: improving  Outcome Summary: VSS. No change in neuro status, NIH- 0. N/T has resolved. Pt only c/o is a headache, refused prn. Gait is unsteady, up to br, voiding. SCD in place. Safety maintained.

## 2020-11-15 NOTE — PLAN OF CARE
Problem: Adult Inpatient Plan of Care  Goal: Plan of Care Review  Recent Flowsheet Documentation  Taken 11/15/2020 0801 by Marilu Siu, PT, DPT, NCS  Progress: improving  Plan of Care Reviewed With: patient  Outcome Summary: PT evaluation completed. The patient presents alert and oriented x4 sitting EOB. She demonstrates mild L LE weakness of 4+/5 and no sensation deficits or coordination deficits. She reports not feeling well, but cannot describe what feels bad. She is unsteady on her feet with a forward flexed posture and required 2 UE support to maintain her balance. She refused the use of a walker, however she will benefit from the use of a walker at this time. She lives alone and has multiple stairs to navigate into and in her home. She will benefit from skilled PT services to work on gait safety and education. Recommend discharge home with assist and  home health.

## 2020-11-16 ENCOUNTER — APPOINTMENT (OUTPATIENT)
Dept: ULTRASOUND IMAGING | Facility: HOSPITAL | Age: 75
End: 2020-11-16

## 2020-11-16 ENCOUNTER — APPOINTMENT (OUTPATIENT)
Dept: NEUROLOGY | Facility: HOSPITAL | Age: 75
End: 2020-11-16

## 2020-11-16 VITALS
OXYGEN SATURATION: 95 % | HEART RATE: 72 BPM | TEMPERATURE: 98.2 F | BODY MASS INDEX: 35 KG/M2 | HEIGHT: 65 IN | RESPIRATION RATE: 16 BRPM | SYSTOLIC BLOOD PRESSURE: 146 MMHG | DIASTOLIC BLOOD PRESSURE: 68 MMHG | WEIGHT: 210.1 LBS

## 2020-11-16 PROBLEM — G45.9 TRANSIENT ISCHEMIC ATTACK (TIA): Status: ACTIVE | Noted: 2020-11-16

## 2020-11-16 PROBLEM — E11.9 TYPE 2 DIABETES MELLITUS (HCC): Status: ACTIVE | Noted: 2020-11-16

## 2020-11-16 PROBLEM — G47.34 NOCTURNAL HYPOXEMIA: Status: ACTIVE | Noted: 2020-11-16

## 2020-11-16 PROBLEM — E66.9 OBESITY (BMI 30-39.9): Status: ACTIVE | Noted: 2019-11-27

## 2020-11-16 PROCEDURE — G0378 HOSPITAL OBSERVATION PER HR: HCPCS

## 2020-11-16 PROCEDURE — 97165 OT EVAL LOW COMPLEX 30 MIN: CPT

## 2020-11-16 PROCEDURE — 97110 THERAPEUTIC EXERCISES: CPT

## 2020-11-16 PROCEDURE — 93880 EXTRACRANIAL BILAT STUDY: CPT | Performed by: SURGERY

## 2020-11-16 PROCEDURE — 96376 TX/PRO/DX INJ SAME DRUG ADON: CPT

## 2020-11-16 PROCEDURE — 99214 OFFICE O/P EST MOD 30 MIN: CPT | Performed by: PSYCHIATRY & NEUROLOGY

## 2020-11-16 PROCEDURE — 92523 SPEECH SOUND LANG COMPREHEN: CPT | Performed by: SPEECH-LANGUAGE PATHOLOGIST

## 2020-11-16 PROCEDURE — 95816 EEG AWAKE AND DROWSY: CPT

## 2020-11-16 PROCEDURE — 25010000002 LEVETIRACETAM IN NACL 0.82% 500 MG/100ML SOLUTION: Performed by: PSYCHIATRY & NEUROLOGY

## 2020-11-16 PROCEDURE — 97116 GAIT TRAINING THERAPY: CPT

## 2020-11-16 PROCEDURE — 95816 EEG AWAKE AND DROWSY: CPT | Performed by: PSYCHIATRY & NEUROLOGY

## 2020-11-16 PROCEDURE — 93880 EXTRACRANIAL BILAT STUDY: CPT

## 2020-11-16 RX ORDER — LEVETIRACETAM 500 MG/1
500 TABLET ORAL EVERY 12 HOURS SCHEDULED
Qty: 60 TABLET | Refills: 0 | Status: ON HOLD | OUTPATIENT
Start: 2020-11-16 | End: 2020-12-01 | Stop reason: SDUPTHER

## 2020-11-16 RX ORDER — ATORVASTATIN CALCIUM 80 MG/1
80 TABLET, FILM COATED ORAL NIGHTLY
Qty: 30 TABLET | Refills: 0 | Status: SHIPPED | OUTPATIENT
Start: 2020-11-16 | End: 2020-12-16

## 2020-11-16 RX ORDER — CLOPIDOGREL BISULFATE 75 MG/1
75 TABLET ORAL DAILY
Qty: 30 TABLET | Refills: 0 | Status: SHIPPED | OUTPATIENT
Start: 2020-11-16 | End: 2020-12-01 | Stop reason: HOSPADM

## 2020-11-16 RX ORDER — LEVETIRACETAM 500 MG/1
500 TABLET ORAL EVERY 12 HOURS SCHEDULED
Status: DISCONTINUED | OUTPATIENT
Start: 2020-11-16 | End: 2020-11-16 | Stop reason: HOSPADM

## 2020-11-16 RX ADMIN — SODIUM CHLORIDE, PRESERVATIVE FREE 10 ML: 5 INJECTION INTRAVENOUS at 09:20

## 2020-11-16 RX ADMIN — ASPIRIN 81 MG: 81 TABLET ORAL at 08:41

## 2020-11-16 RX ADMIN — SODIUM CHLORIDE, PRESERVATIVE FREE 10 ML: 5 INJECTION INTRAVENOUS at 10:42

## 2020-11-16 RX ADMIN — HYDROCHLOROTHIAZIDE 12.5 MG: 25 TABLET ORAL at 08:42

## 2020-11-16 RX ADMIN — FERROUS SULFATE TAB 325 MG (65 MG ELEMENTAL FE) 325 MG: 325 (65 FE) TAB at 08:41

## 2020-11-16 RX ADMIN — PANTOPRAZOLE SODIUM 40 MG: 40 TABLET, DELAYED RELEASE ORAL at 06:30

## 2020-11-16 RX ADMIN — LOSARTAN POTASSIUM 100 MG: 50 TABLET, FILM COATED ORAL at 08:42

## 2020-11-16 RX ADMIN — LEVETIRACETAM 500 MG: 5 INJECTION INTRAVENOUS at 09:20

## 2020-11-16 NOTE — DISCHARGE SUMMARY
[facility]  DISCHARGE SUMMARY       Date of Admission: 11/14/2020  Date of Discharge:    Primary Care Physician: Car Landeros MD    Presenting Problem/History of Present Illness:  Stroke-like symptoms [R29.90]     Final Discharge Diagnoses:  Active Hospital Problems    Diagnosis   • **Transient ischemic attack (TIA)   • Seizures (CMS/HCC)   • Nocturnal hypoxemia   • Type 2 diabetes mellitus (CMS/HCC)   • CAD (coronary artery disease)   • Obesity (BMI 30-39.9)       Consults: Dr. Дмитрий virgen with neurology.    Procedures Performed:     EEG 11/16/2020      Pertinent Test Results:   Lab Results (last 72 hours)     Procedure Component Value Units Date/Time    POC Glucose Once [[id number]]  (Abnormal) Collected: 11/15/20 1252    Specimen: Blood Updated: 11/15/20 1304     Glucose 137 mg/dL      Comment: : [id number] Jaci LindsayMeter ID: [id number]       POC Glucose Once [[id number]]  (Normal) Collected: 11/15/20 0644    Specimen: Blood Updated: 11/15/20 0655     Glucose 114 mg/dL      Comment: : [id number] Susi LisaMeter ID: [id number]       Hemoglobin A1c [[id number]]  (Abnormal) Collected: 11/15/20 0431    Specimen: Blood Updated: 11/15/20 0537     Hemoglobin A1C 6.40 %     Narrative:      Hemoglobin A1C Ranges:    Increased Risk for Diabetes  5.7% to 6.4%  Diabetes                     >= 6.5%  Diabetic Goal                < 7.0%    Lipid Panel [[id number]]  (Abnormal) Collected: 11/15/20 0431    Specimen: Blood Updated: 11/15/20 0522     Total Cholesterol 118 mg/dL      Triglycerides 96 mg/dL      HDL Cholesterol 33 mg/dL      LDL Cholesterol  67 mg/dL      VLDL Cholesterol 18 mg/dL      LDL/HDL Ratio 1.99    Narrative:      Cholesterol Reference Ranges  (U.S. Department of Health and Human Services ATP III Classifications)    Desirable          <200 mg/dL  Borderline High    200-239 mg/dL  High Risk          >240 mg/dL      Triglyceride  Reference Ranges  (U.S. Department of Health and Human Services ATP III Classifications)    Normal           <150 mg/dL  Borderline High  150-199 mg/dL  High             200-499 mg/dL  Very High        >500 mg/dL    HDL Reference Ranges  (U.S. Department of Health and Human Services ATP III Classifcations)    Low     <40 mg/dl (major risk factor for CHD)  High    >60 mg/dl ('negative' risk factor for CHD)        LDL Reference Ranges  (U.S. Department of Health and Human Services ATP III Classifcations)    Optimal          <100 mg/dL  Near Optimal     100-129 mg/dL  Borderline High  130-159 mg/dL  High             160-189 mg/dL  Very High        >189 mg/dL    Comprehensive Metabolic Panel [[id number]]  (Abnormal) Collected: 11/15/20 0431    Specimen: Blood Updated: 11/15/20 0522     Glucose 125 mg/dL      BUN 12 mg/dL      Creatinine 0.46 mg/dL      Sodium 140 mmol/L      Potassium 3.6 mmol/L      Chloride 103 mmol/L      CO2 27.0 mmol/L      Calcium 8.9 mg/dL      Total Protein 6.4 g/dL      Albumin 3.70 g/dL      ALT (SGPT) 14 U/L      AST (SGOT) 14 U/L      Alkaline Phosphatase 76 U/L      Total Bilirubin 0.6 mg/dL      eGFR Non African Amer 133 mL/min/1.73      Globulin 2.7 gm/dL      A/G Ratio 1.4 g/dL      BUN/Creatinine Ratio 26.1     Anion Gap 10.0 mmol/L     Narrative:      GFR Normal >60  Chronic Kidney Disease <60  Kidney Failure <15      TSH [[id number]]  (Normal) Collected: 11/15/20 0431    Specimen: Blood Updated: 11/15/20 0522     TSH 1.370 uIU/mL     CBC (No Diff) [[id number]]  (Abnormal) Collected: 11/15/20 0431    Specimen: Blood Updated: 11/15/20 0449     WBC 7.51 10*3/mm3      RBC 3.86 10*6/mm3      Hemoglobin 11.5 g/dL      Hematocrit 34.3 %      MCV 88.9 fL      MCH 29.8 pg      MCHC 33.5 g/dL      RDW 13.0 %      RDW-SD 42.0 fl      MPV 9.6 fL      Platelets 324 10*3/mm3     COVID PRE-OP / PRE-PROCEDURE SCREENING ORDER (NO ISOLATION) - Swab, Nasal Cavity [[id number]]  (Normal) Collected: 11/14/20  1558    Specimen: Swab from Nasal Cavity Updated: 11/14/20 1654    Narrative:      The following orders were created for panel order COVID PRE-OP / PRE-PROCEDURE SCREENING ORDER (NO ISOLATION) - Swab, Nasal Cavity.  Procedure                               Abnormality         Status                     ---------                               -----------         ------                     COVID-19,[facility]...[[id number]]  Normal              Final result                 Please view results for these tests on the individual orders.    COVID-19,[facility] IN-HOUSE,Nasal Swab No Transport Media 3-4 HR TAT - Swab, Nasal Cavity [[id number]]  (Normal) Collected: 11/14/20 1558    Specimen: Swab from Nasal Cavity Updated: 11/14/20 1654     COVID19 Not Detected    Narrative:      Fact sheet for providers: https://www.fda.gov/media/097603/download     Fact sheet for patients: https://www.fda.gov/media/936721/download    Urine Drug Screen - Urine, Clean Catch [[id number]]  (Normal) Collected: 11/14/20 1556    Specimen: Urine, Clean Catch Updated: 11/14/20 1622     THC, Screen, Urine Negative     Phencyclidine (PCP), Urine Negative     Cocaine Screen, Urine Negative     Methamphetamine, Ur Negative     Opiate Screen Negative     Amphetamine Screen, Urine Negative     Benzodiazepine Screen, Urine Negative     Tricyclic Antidepressants Screen Negative     Methadone Screen, Urine Negative     Barbiturates Screen, Urine Negative     Oxycodone Screen, Urine Negative     Propoxyphene Screen Negative     Buprenorphine, Screen, Urine Negative    Narrative:      Cutoff For Drugs Screened:    Amphetamines               500 ng/ml  Barbiturates               200 ng/ml  Benzodiazepines            150 ng/ml  Cocaine                    150 ng/ml  Methadone                  200 ng/ml  Opiates                    100 ng/ml  Phencyclidine               25 ng/ml  THC                            50 ng/ml  Methamphetamine            500  ng/ml  Tricyclic Antidepressants  300 ng/ml  Oxycodone                  100 ng/ml  Propoxyphene               300 ng/ml  Buprenorphine               10 ng/ml    The normal value for all drugs tested is negative. This report includes unconfirmed screening results, with the cutoff values listed, to be used for medical treatment purposes only.  Unconfirmed results must not be used for non-medical purposes such as employment or legal testing.  Clinical consideration should be applied to any drug of abuse test, particularly when unconfirmed results are used.      Magnesium [[id number]]  (Normal) Collected: 11/14/20 1026    Specimen: Blood Updated: 11/14/20 1540     Magnesium 1.8 mg/dL     Troponin [[id number]]  (Normal) Collected: 11/14/20 1026    Specimen: Blood Updated: 11/14/20 1213     Troponin T <0.010 ng/mL     Narrative:      Troponin T Reference Range:  <= 0.03 ng/mL-   Negative for AMI  >0.03 ng/mL-     Abnormal for myocardial necrosis.  Clinicians would have to utilize clinical acumen, EKG, Troponin and serial changes to determine if it is an Acute Myocardial Infarction or myocardial injury due to an underlying chronic condition.       Results may be falsely decreased if patient taking Biotin.      Santa Draw [[id number]] Collected: 11/14/20 1026    Specimen: Blood Updated: 11/14/20 1130    Narrative:      The following orders were created for panel order Santa Draw.  Procedure                               Abnormality         Status                     ---------                               -----------         ------                     Light Blue Top[[id number]]                                   Final result               Green Top (Gel)[[id number]]                                  Final result               Lavender Top[[id number]]                                     Final result               Red Top[[id number]]                                          Final result                 Please view results for  these tests on the individual orders.    Green Top (Gel) [[id number]] Collected: 11/14/20 1026    Specimen: Blood Updated: 11/14/20 1130     Extra Tube Hold for add-ons.     Comment: Auto resulted.       Red Top [[id number]] Collected: 11/14/20 1026    Specimen: Blood Updated: 11/14/20 1130     Extra Tube Hold for add-ons.     Comment: Auto resulted.       Light Blue Top [[id number]] Collected: 11/14/20 1026    Specimen: Blood Updated: 11/14/20 1130     Extra Tube hold for add-on     Comment: Auto resulted       Lavender Top [[id number]] Collected: 11/14/20 1026    Specimen: Blood Updated: 11/14/20 1130     Extra Tube hold for add-on     Comment: Auto resulted       Comprehensive Metabolic Panel [[id number]]  (Abnormal) Collected: 11/14/20 1026    Specimen: Blood Updated: 11/14/20 1057     Glucose 128 mg/dL      BUN 11 mg/dL      Creatinine 0.53 mg/dL      Sodium 141 mmol/L      Potassium 3.8 mmol/L      Chloride 102 mmol/L      CO2 30.0 mmol/L      Calcium 9.4 mg/dL      Total Protein 7.3 g/dL      Albumin 4.20 g/dL      ALT (SGPT) 18 U/L      AST (SGOT) 20 U/L      Alkaline Phosphatase 99 U/L      Total Bilirubin 0.6 mg/dL      eGFR Non African Amer 113 mL/min/1.73      Globulin 3.1 gm/dL      A/G Ratio 1.4 g/dL      BUN/Creatinine Ratio 20.8     Anion Gap 9.0 mmol/L     Narrative:      GFR Normal >60  Chronic Kidney Disease <60  Kidney Failure <15      Protime-INR [[id number]]  (Normal) Collected: 11/14/20 1026    Specimen: Blood Updated: 11/14/20 1048     Protime 12.7 Seconds      INR 0.99    CBC & Differential [[id number]]  (Abnormal) Collected: 11/14/20 1026    Specimen: Blood Updated: 11/14/20 1039    Narrative:      The following orders were created for panel order CBC & Differential.  Procedure                               Abnormality         Status                     ---------                               -----------         ------                     CBC Auto Differential[[id number]]        Abnormal             Final result                 Please view results for these tests on the individual orders.    CBC Auto Differential [[id number]]  (Abnormal) Collected: 11/14/20 1026    Specimen: Blood Updated: 11/14/20 1039     WBC 8.54 10*3/mm3      RBC 4.16 10*6/mm3      Hemoglobin 12.8 g/dL      Hematocrit 37.2 %      MCV 89.4 fL      MCH 30.8 pg      MCHC 34.4 g/dL      RDW 13.2 %      RDW-SD 43.0 fl      MPV 9.5 fL      Platelets 374 10*3/mm3      Neutrophil % 76.7 %      Lymphocyte % 15.3 %      Monocyte % 5.3 %      Eosinophil % 1.9 %      Basophil % 0.4 %      Immature Grans % 0.4 %      Neutrophils, Absolute 6.56 10*3/mm3      Lymphocytes, Absolute 1.31 10*3/mm3      Monocytes, Absolute 0.45 10*3/mm3      Eosinophils, Absolute 0.16 10*3/mm3      Basophils, Absolute 0.03 10*3/mm3      Immature Grans, Absolute 0.03 10*3/mm3      nRBC 0.0 /100 WBC         Imaging Results (Last 72 Hours)     Procedure Component Value Units Date/Time    MRI Brain With Contrast [[id number]] Collected: 11/14/20 1851     Updated: 11/14/20 1859    Narrative:      EXAMINATION: MRI of the brain with contrast 11/14/2020     HISTORY: Abnormal MRI raising question of seizure     FINDINGS: Postcontrast imaging was requested for further evaluation of a  focus of restricted diffusion within the mesial right temporal lobe and  specifically involving the right hippocampus on routine earlier MR  imaging.. Postcontrast imaging is obtained. FLAIR sagittal and axial  sequences as well as high-resolution imaging through the temporal lobes  are also obtained. There is atrophy of the brain with prominence of the  subarachnoid spaces and ventricular enlargement. Multiple foci of T2  abnormalities are are present involving the periventricular and higher  white matter tracts suggesting small vessel ischemic change. There is  asymmetric increased T2 signal on FLAIR sequencing within the mesial  right temporal lobe and specifically in the region of the right  hippocampus.  This is confirmed on thin section high-resolution T2  imaging in the coronal plane. This does correspond to the area of  restricted diffusion and abnormal FLAIR signal on routine imaging  obtained earlier today. There is no associated mass effect or abnormal  contrast enhancement. I would favor that this finding is related to the  patient's postictal state. FLAIR and diffusion abnormalities can be  demonstrated in the immediate postictal time frame. Acute infarct in  this distribution is considered less likely. Short interval follow-up  imaging will be recommended.       Impression:      1.. As demonstrated on routine imaging through the brain there is again  asymmetric T2 signal involving the mesial right temporal lobe and  specifically the hippocampus. There is no associated abnormal contrast  enhancement. A neoplasm is considered unlikely and I suspect this  represents increased T2 signal and diffusion restriction related to the  patient's postictal state. Acute ischemia/infarct is considered less  likely. Short interval follow-up imaging will be recommended to assure  stability and/or likely resolution of this finding.  2. There is mild atrophy and small vessel disease. No foci of abnormal  contrast enhancement are demonstrated.  This report was finalized on 11/14/2020 18:56 by Dr. Emmett Parson MD.    MRI Brain Without Contrast [[id number]] Collected: 11/14/20 1452     Updated: 11/14/20 1512    Narrative:      Indication: numbness around mouth and small fingers to the left hand        Technique: Multisequence, multiplanar MRI of the brain without contrast.     Comparison: CT scan dated 11/14/2020     Findings:      Abnormal diffusion signal hyperintensity diffusely along the right  hippocampus. Associated T2 FLAIR hyperintensity identified along the  left hippocampus. No T2 FLAIR abnormality identified at the temporal  pole. The contralateral mesial temporal lobe is unremarkable. Underlying  mild chronic  small vessel ischemic change. No intra-axial or extra-axial  hemorrhage. No intracranial mass lesion or visualized mass effect. The  ventricles, cortical sulci and basal cisterns are symmetric and age  appropriate. Posterior fossa structures are unremarkable. Pituitary  gland and sella are unremarkable. The major intracranial flow-voids are  preserved. Orbital contents are unremarkable. Chronic mucosal thickening  along the floors of the maxillary sinuses. Mastoid air cells are clear.  Nonspecific T2 FLAIR hyperintensity left of midline in the frontal bone.  Inner and outer table of the cortical bone appears intact and there is  no lytic lesion in this area identified on today's CT scan.        Impression:      Impression:     1. Abnormal diffusion restriction identified diffusely along the right  hippocampus. Favor post ictal change over hippocampal infarct given the  diffuse nature of the signal abnormality along the hippocampus as  compared with a focal involvement which is more typical of infarct.  2. I do not see an obvious intra-axial mass on this exam. Postcontrast  imaging could be considered to evaluate for enhancing lesion or  suspicious pachymeningeal enhancement.     Findings and recommendations were discussed with MATIAS HENSLEY on  11/14/2020 at 3:09 PM CST.  This report was finalized on 11/14/2020 15:09 by Dr Ryder Castaneda, .    CT Head Without Contrast [[id number]] Collected: 11/14/20 1246     Updated: 11/14/20 1251    Narrative:      CT HEAD WO CONTRAST- 11/14/2020 12:23 PM CST     HISTORY: numbness around mouth and small fingers in left hand       DOSE LENGTH PRODUCT: 608 mGy cm. Automated exposure control was also  utilized to decrease patient radiation dose.     Technique:   Axial CT of the brain without IV contrast. Sagittal and coronal  reformations are also provided for review. Soft tissue and bone kernels  are available for interpretation.     Comparison: None.     Findings:      There is  "no evidence of acute large vascular territory infarct. No  intra-axial or extra-axial hemorrhage. No visualized mass lesion or mass  effect. The ventricles, cortical sulci and basal cisterns are symmetric  and age appropriate.  Posterior fossa structures are unremarkable. The  scalp and calvarium are intact. Visualized paranasal sinuses and  mastoids are clear.        Impression:      Impression:    1. No acute intracranial process.  This report was finalized on 11/14/2020 12:48 by Dr Ryder Castaneda, .        Chief Complaint on Day of Discharge: Overall, patient reports feeling well.  She denies dizziness, syncope or presyncope.  She feels neurologically at baseline.  She would benefit from home with home health.  She is eager for discharge home.  Okay for discharge from neurology.  She has been instructed on seizure cautions.    Hospital Course:  The patient is a 75 y.o. female who presented to [facility] with complaints of numbness.  Patient's last known well was at 8 PM on 11/13.  Patient stated on the day of admit she woke up at around 4 AM and noted tingling as if she had just swallowed \"hot pepper.\"  She had numbness of bilateral lips.  Associated numbness in digits 4 and 5 the left hand.  Upon evaluation the emergency department, MRI of the brain showed abnormality in the diffusion weighted imaging concern for seizure.  She denies any history of seizure.  She was admitted to the hospitalist group for further evaluation and management.    Neurology evaluated patient.  She was started on Keppra.  EEG on 11/14/2020 was read as normal awake and drowsy EEG with bilateral temporal slowing at times.  No clear seizurogenic potentials.  Electrode artifact on T4.    EEG today showed a couple of sharp transients that reversed at T4 and once at P4 which may be a potential seizure focus and in a territory not well seen on previous EEG due to electrode artifact per neurology note.  Plavix added to aspirin 81 mg " "daily.  Plan for dual therapy for 3 weeks then Plavix monotherapy after.  MRI of the brain with and without in 3 to 6 weeks.  Discussed with neurology today, she is okay for discharge from neurology standpoint.  She will need to maintain seizure precautions.  She is to follow-up Dr. Beach and schedule appointment 12/30/2020 at 3 PM.  She has worked with therapy.  Therapy recommends home with home health.  Overnight sleep oximetry did qualify her for oxygen.  She is to use 2 L nasal cannula nightly.  Ambulatory referral to sleep medicine.  Sequential compression devices for VTE prophylaxis.  Overall, she reports being neurologically back at baseline.  She does have slight numbness to the tip of her pinky.  Otherwise she has no neurological deficits.  She is alert and oriented x4.  She denies shortness of breath, chest pain or pressure.  She denies nausea, vomiting abdominal pain.  She is tolerating a diet.  She is eager for discharge home with home health.  She is hemodynamically stable and on room air.  She is to follow-up with Dr. Beach on scheduled appointment 12/30/2020.  She is to follow-up with her primary care provider 1 week.  Seizure precautions.    Condition on Discharge:  Medically stable.    Physical Exam on Discharge:  /68 (BP Location: Left arm, Patient Position: Sitting)   Pulse 72   Temp 98.2 °F (36.8 °C) (Oral)   Resp 16   Ht 165.1 cm (65\")   Wt 95.3 kg (210 lb 1.6 oz)   SpO2 95%   BMI 34.96 kg/m²   Physical Exam  Constitutional:       General: She is not in acute distress.     Appearance: She is well-developed. She is obese. She is not ill-appearing, toxic-appearing or diaphoretic.      Comments: Sitting up in bed.  Tolerating room air.  No acute distress.  Discussed with her nurse Michell   HENT:      Head: Normocephalic and atraumatic.   Eyes:      General: No scleral icterus.     Conjunctiva/sclera: Conjunctivae normal.      Pupils: Pupils are equal, round, and reactive to light. "   Neck:      Musculoskeletal: Neck supple.      Vascular: No JVD.   Cardiovascular:      Rate and Rhythm: Normal rate and regular rhythm.      Heart sounds: Normal heart sounds. No murmur. No friction rub. No gallop.       Comments: Sinus 60-90  Pulmonary:      Effort: Pulmonary effort is normal.      Breath sounds: Normal breath sounds. No wheezing or rales.   Abdominal:      General: Bowel sounds are normal. There is no distension.      Palpations: Abdomen is soft. There is no mass.      Tenderness: There is no abdominal tenderness. There is no guarding or rebound.   Musculoskeletal: Normal range of motion.   Lymphadenopathy:      Cervical: No cervical adenopathy.   Skin:     General: Skin is warm and dry.   Neurological:      Mental Status: She is alert and oriented to person, place, and time.      Cranial Nerves: No cranial nerve deficit.   Psychiatric:         Behavior: Behavior normal.       Discharge Disposition:  Home-Health Care [facility]    Discharge Medications:     Discharge Medications      New Medications      Instructions Start Date   atorvastatin 80 MG tablet  Commonly known as: LIPITOR   80 mg, Oral, Nightly      clopidogrel 75 MG tablet  Commonly known as: PLAVIX   75 mg, Oral, Daily      levETIRAcetam 500 MG tablet  Commonly known as: KEPPRA   500 mg, Oral, Every 12 Hours Scheduled         Continue These Medications      Instructions Start Date   acetaminophen-codeine 300-30 MG per tablet  Commonly known as: TYLENOL #3   1 tablet, Oral, Every 8 Hours PRN      aspirin 81 MG EC tablet   81 mg, Oral, Daily      ferrous sulfate 325 (65 FE) MG tablet   325 mg, Oral, Daily With Breakfast      fluticasone 50 MCG/ACT nasal spray  Commonly known as: FLONASE   USE 1 SPRAY IN THE NOSTRILS BID      hydroCHLOROthiazide 12.5 MG tablet  Commonly known as: HYDRODIURIL   12.5 mg, Oral, Daily      losartan 100 MG tablet  Commonly known as: COZAAR   100 mg, Oral, Daily      omeprazole 20 MG capsule  Commonly known as:  priLOSEC   20 mg, Oral, 2 Times Daily      vitamin B-12 100 MCG tablet  Commonly known as: CYANOCOBALAMIN   50 mcg, Oral, Daily             Discharge Diet:   Diet Instructions     Diet: Regular      Discharge Diet: Regular          Activity at Discharge:   Activity Instructions     Activity as Tolerated            Discharge Care Plan/Instructions:  1.  Seizure precautions.  Continue Keppra.  2.  Continue Plavix and aspirin.  Plan for dual therapy for 3 weeks then Plavix monotherapy after.  3.  Home with home health and physical therapy.  4.  Nocturnal hypoxemia - 2 L nasal cannula nightly.  Ambulatory referral to sleep medicine.  5.  Follow-up with Dr. Beach on 12/30/2020.  6.  Follow-up with primary care provider in 1 week.    Follow-up Appointments:   Future Appointments   Date Time Provider Department Center   11/30/2020 11:45 AM PAD MRI 2 [facility] MRI PAD   1/18/2021 12:30 PM José Antonio Beach MD [facility]       Test Results Pending at Discharge: None.    Electronically signed by Awilda Dyer, 11/16/2020, 1612.    Time: 35 minutes.      I personally evaluated and examined the patient in conjunction with JUAN Erwin and agree with the assessment, treatment plan, and disposition of the patient as recorded by her. My history, exam, and further recommendations are:     S:  Doing ok, no seizure activity today    O:  [facility]S RRR    A:  Seizures, ?TIA    P:  Ok for d/c on seizure precautions, keppra, follow up with neurology    Electronically signed by Car Salazar MD, 11/16/2020 , 18:57 CST.

## 2020-11-16 NOTE — PLAN OF CARE
Problem: Adult Inpatient Plan of Care  Goal: Plan of Care Review  Outcome: Ongoing, Progressing  Flowsheets (Taken 11/16/2020 1317)  Progress: (Initial Evaluation) no change  Plan of Care Reviewed With: patient  Outcome Summary: Communication evaluation completed. The patient is alert and cooperative. Primary problem: Stroke-like symptoms/possible seizure. The patient is able to complete expressive language and auditory comprehension tasks with no difficulty. Cognitive tasks completed with accuracy. The patient presents with functional speech, language, and cognition. No impairments present which warrant skilled speech intervention. SLP signing off at this time. If acute changes please reconsult.

## 2020-11-16 NOTE — THERAPY TREATMENT NOTE
Acute Care - Physical Therapy Treatment Note  [facility]     Patient Name: Pascale Jimenez  : 1945  MRN: [id number]  Today's Date: 2020           PT Assessment (last 12 hours)      PT Evaluation and Treatment     Row Name 20 1109 20 0955       Physical Therapy Time and Intention    Subjective Information  no complaints  (Pended)   -  --    Document Type  therapy note (daily note)  (Pended)   -  --    Mode of Treatment  physical therapy  (Pended)   -  --    Session Not Performed  --  patient unavailable for treatment  -NW    Comment, Session Not Performed  --  EEG  -NW    Comment  Pt states she doesn't feel any problems.  (Pended)   -  --    Row Name 20 0953          Physical Therapy Time and Intention    Document Type  therapy note (daily note)  (Pended)   -     Mode of Treatment  physical therapy  (Pended)   -     Row Name 20 110          General Information    Existing Precautions/Restrictions  fall  (Pended)   -     Row Name 20 110          Pain Scale: Numbers Pre/Post-Treatment    Pretreatment Pain Rating  0/10 - no pain  (Pended)   -     Posttreatment Pain Rating  0/10 - no pain  (Pended)   -     Row Name 20 110          Bed Mobility    Comment (Bed Mobility)  Pt sitting EOB upon arrival.  (Pended)   -     Row Name 20 110          Transfers    Sit-Stand [place] (Transfers)  independent  (Pended)   -     Row Name 20 1109          Gait/Stairs (Locomotion)    [place] Level (Gait)  contact guard  (Pended)   -     Assistive Device (Gait)  walker, front-wheeled  (Pended)   -     Distance in Feet (Gait)  250  (Pended)  x2  -     Deviations/Abnormal Patterns (Gait)  base of support, wide;stride length decreased  (Pended)   -     Comment (Gait/Stairs)  Pt c/o difficulty maneuvering FWW d/t defective wheel alignment; walker kept pulling R requiring Min A to correct  (Pended)   -     Row Name 20 1109 20  0955       Hip (Therapeutic Exercise)    Hip (Therapeutic Exercise)  AROM (active range of motion)  (Pended)   -  --  (Pended)   -    Hip AROM (Therapeutic Exercise)  bilateral;flexion;sitting;10 repetitions  (Pended)   -  --  (Pended)   -    Row Name 11/16/20 1109 11/16/20 0955       Knee (Therapeutic Exercise)    Knee (Therapeutic Exercise)  AROM (active range of motion)  (Pended)   -  --  (Pended)   -    Knee AROM (Therapeutic Exercise)  bilateral;LAQ (long arc quad);sitting;10 repetitions  (Pended)   -  --  (Pended)   -    Row Name 11/16/20 1109 11/16/20 0955       Ankle (Therapeutic Exercise)    Ankle (Therapeutic Exercise)  AROM (active range of motion)  (Pended)   -  --  (Pended)   -    Ankle AROM (Therapeutic Exercise)  bilateral;dorsiflexion;plantarflexion;sitting  (Pended)  20 reps  -  --  (Pended)  20 reps  -    Row Name 11/16/20 1109 11/16/20 0955       Plan of Care Review    Plan of Care Reviewed With  patient  (Pended)   -  --  (Pended)   -    Progress  improving  (Pended)   -  --  (Pended)   -    Outcome Summary  Pt agreeable to therapy. Pt is independent with transfers; bed mobility not performed today. She ambulated 250' x2 with CGA. Pt c/o difficulty using FWW during ambulation d/t faulty wheel alignment that resulted in hard pulling to R side; Min A was required to prevent FWW from drifting R. Pt possibly able to ambulate better without AD. Pt would benefit from further skilled therapy for BLE strength upon d/c.  (Pended)   -  --  (Pended)   -    Row Name 11/16/20 1109 11/16/20 0955       Positioning and Restraints    Pre-Treatment Position  in bed  (Pended)   -  --  (Pended)   -WG    Post Treatment Position  bed  (Pended)   -  --  (Pended)   -    In Bed  sitting EOB;call light within reach;encouraged to call for assist;with other staff;side rails up x2  (Pended)  Pt preparing to transport to [facility].  -WG  --  (Pended)   -      User Key  (r) = Recorded  By, (t) = Taken By, (c) = Cosigned By    Initials Name Provider Type    NW Elba Love, PTA Physical Therapy Assistant    Faustino King PTA Student PTA Student        Physical Therapy Education                 Title: PT OT SLP Therapies (In Progress)     Topic: Physical Therapy (In Progress)     Point: Mobility training (Done)     Learning Progress Summary           Patient Acceptance, E, VU by  at 11/16/2020 1152    Comment: Adjusting FWW to height    Acceptance, E, VU by MS at 11/15/2020 0831    Comment: role of PT in her care, safety during gait                   Point: Home exercise program (Not Started)     Learner Progress:  Not documented in this visit.          Point: Body mechanics (Not Started)     Learner Progress:  Not documented in this visit.          Point: Precautions (Not Started)     Learner Progress:  Not documented in this visit.                      User Key     Initials Effective Dates Name Provider Type Discipline    MS 06/19/18 -  Marilu Siu, PT, DPT, NCS Physical Therapist PT     07/15/20 -  Faustino Beach PTA Student PTA Student               PT Recommendation and Plan     Plan of Care Reviewed With: (P) patient  Progress: (P) improving  Outcome Summary: (P) Pt agreeable to therapy. Pt is independent with transfers; bed mobility not performed today. She ambulated 250' x2 with CGA. Pt c/o difficulty using FWW during ambulation d/t faulty wheel alignment that resulted in hard pulling to R side; Min A was required to prevent FWW from drifting R. Pt possibly able to ambulate better without AD. Pt would benefit from further skilled therapy for BLE strength upon d/c.       Time Calculation:   PT Charges     Row Name 11/16/20 1147             Time Calculation    Start Time  1109  (Pended)   -      Stop Time  1133  (Pended)   -      Time Calculation (min)  24 min  (Pended)   -         Time Calculation- PT    Total Timed Code Minutes- PT  24 minute(s)  (Pended)   -          Timed Charges    61080 - PT Therapeutic Exercise Minutes  9  (Pended)   -WG      96014 - Gait Training Minutes   15  (Pended)   -WG        User Key  (r) = Recorded By, (t) = Taken By, (c) = Cosigned By    Initials Name Provider Type    Faustino King PTA Student PTA Student            PT G-Codes  Outcome Measure Options: AM-PAC 6 Clicks Daily Activity (OT)  AM-PAC 6 Clicks Score (OT): 24  Modified Gaines Scale: 1 - No significant disability despite symptoms.  Able to carry out all usual duties and activities.    Faustino Beach PTA Student  11/16/2020

## 2020-11-16 NOTE — NURSING NOTE
"Patient not interested in stroke education currently. Informed her symptoms could be related to TIA or possible seizure. She did not seem interested. I ceased attempting to educate and will followup. We did briefly discuss her symptoms and risk factors for stroke.  Does not have stroke booklet at bedside. She did not want it, saying \"I didn't have a stroke.\" EEG tech at bedside as well.    GUADALUPE BermanN, RN  11- 10:40  "

## 2020-11-16 NOTE — PLAN OF CARE
Goal Outcome Evaluation:  Plan of Care Reviewed With: patient  Progress: no change  Outcome Summary: VSS. No change in neuro status, NIH-0. No c/o pain voiced. Up to br w/unsteady gait. SCD in place. Safety maintained.

## 2020-11-16 NOTE — PROGRESS NOTES
Neurology Progress Note      Chief Complaint:  F/u stroke    Subjective     Subjective:  Patient lying in bed and had just returned from bathroom. No new complaints. No further episodes or spells. EEG at bedside.        OT reported unsteady gait and patient refusing walker and defer balance and mobility to PT. PT recommending skilled PT for gait safety and recommend d/c home with HH.      EEG 11/14/2020-  Was read as normal awake and drowsy EEG with bilateral temporal slowing at times but that's not unremarkable for age  No clear seizurogenic potentials.  There was electrode artifact on T4 (right temporal love) which is the area of interest from MRI abnormalities.         Medications:  Current Facility-Administered Medications   Medication Dose Route Frequency Provider Last Rate Last Dose   • acetaminophen (TYLENOL) tablet 650 mg  650 mg Oral Q4H PRN Rafi Martin MD   650 mg at 11/15/20 0941   • aspirin EC tablet 81 mg  81 mg Oral Daily Rafi Martin MD   81 mg at 11/16/20 0841   • atorvastatin (LIPITOR) tablet 80 mg  80 mg Oral Nightly Rafi Martin MD       • clopidogrel (PLAVIX) tablet 75 mg  75 mg Oral Daily Katy Leach MD   75 mg at 11/15/20 1536   • ferrous sulfate tablet 325 mg  325 mg Oral Daily With Breakfast Rafi Martin MD   325 mg at 11/16/20 0841   • hydroCHLOROthiazide (HYDRODIURIL) tablet 12.5 mg  12.5 mg Oral Daily Rafi Martin MD   12.5 mg at 11/16/20 0842   • levETIRAcetam (KEPPRA) tablet 500 mg  500 mg Oral Q12H Sis Graves APRN       • losartan (COZAAR) tablet 100 mg  100 mg Oral Daily Rafi Martin MD   100 mg at 11/16/20 0842   • ondansetron (ZOFRAN) injection 4 mg  4 mg Intravenous Q6H PRN Rafi Martin MD       • pantoprazole (PROTONIX) EC tablet 40 mg  40 mg Oral QAM Rafi Martin MD   40 mg at 11/16/20 0630   • sodium chloride 0.9 % flush 10 mL  10 mL Intravenous PRN Rafi Martin MD       • sodium chloride 0.9 % flush 10 mL  10 mL Intravenous Q12H  "Rafi Martin MD   10 mL at 11/16/20 1042   • sodium chloride 0.9 % flush 10 mL  10 mL Intravenous PRN Rafi Martin MD   10 mL at 11/16/20 0920       Review of Systems:   -A 14 point review of systems is completed and is negative except for frequent urination after her \"water pill\".       Objective      Vital Signs  Temp:  [97.5 °F (36.4 °C)-98.2 °F (36.8 °C)] 98 °F (36.7 °C)  Heart Rate:  [62-83] 77  Resp:  [18] 18  BP: (128-153)/(54-65) 133/63    Telemetry: S 60-90    Physical Exam:  HEENT:  Neck supple  CVS:  Regular rate and rhythm.  No murmurs  Carotid Examination:  No bruits  Lungs:  Clear to auscultation  Abdomen:  Non-tender, Non-distended  Extremities:  No signs of peripheral edema     Neurologic Exam:     -Awake, Alert, Oriented X 3  -No word finding difficulties  -No aphasia  -No dysarthria  -Follows simple and complex commands     Cranial nerves II through XII intact.  CN II:  Visual fields full.  Pupils equally reactive to light  CN III, IV, VI:  Extraocular Muscles full with no signs of nystagmus  CN V:  Facial sensory is symmetric   CN VII:  Facial motor symmetric  CN VIII:  Gross hearing intact bilaterally  CN IX/X:  Palate elevates symmetrically  CN XI:  Shoulder shrug symmetric  CN XII:  Tongue is midline on protrusion     Motor: (strength out of 5:  1= minimal movement, 2 = movement in plane of gravity, 3 = movement against gravity, 4 = movement against some resistance, 5 = full strength)     -Right Upper Ext: Proximal: 5 Distal: 5  -Left Upper Ext: Proximal: 5   Distal: 5     -Right Lower Ext: Proximal: 5 Distal: 5  -Left Lower Ext: Proximal: 5   Distal: 5     DTR:  2+ throughout in all four extremities     Sensory:  -Intact to light touch, pinprick, temperature, pain, and proprioception     Coordination/Gait:  -No ataxia       Results Review:    I reviewed the patient's new clinical results.    Results from last 7 days   Lab Units 11/15/20  0431 11/14/20  1026   WBC 10*3/mm3 7.51 8.54 "   HEMOGLOBIN g/dL 11.5* 12.8   HEMATOCRIT % 34.3 37.2   PLATELETS 10*3/mm3 324 374        Results from last 7 days   Lab Units 11/15/20  0431 11/14/20  1026   SODIUM mmol/L 140 141   POTASSIUM mmol/L 3.6 3.8   CHLORIDE mmol/L 103 102   CO2 mmol/L 27.0 30.0*   BUN mg/dL 12 11   CREATININE mg/dL 0.46* 0.53*   CALCIUM mg/dL 8.9 9.4   BILIRUBIN mg/dL 0.6 0.6   ALK PHOS U/L 76 99   ALT (SGPT) U/L 14 18   AST (SGOT) U/L 14 20   GLUCOSE mg/dL 125* 128*        Lab Results   Component Value Date    MG 1.8 11/14/2020    PROTIME 12.7 11/14/2020    INR 0.99 11/14/2020     No components found for: POCGLUC  No components found for: A1C  Lab Results   Component Value Date    HDL 33 (L) 11/15/2020    LDL 67 11/15/2020     No components found for: B12  Lab Results   Component Value Date    TSH 1.370 11/15/2020     Imaging Results (Last 72 Hours)     Procedure Component Value Units Date/Time    US Carotid Bilateral [[id number]] Resulted: 11/16/20 1136     Updated: 11/16/20 1155    MRI Brain With Contrast [[id number]] Collected: 11/14/20 1851     Updated: 11/14/20 1859    Narrative:      EXAMINATION: MRI of the brain with contrast 11/14/2020     HISTORY: Abnormal MRI raising question of seizure     FINDINGS: Postcontrast imaging was requested for further evaluation of a  focus of restricted diffusion within the mesial right temporal lobe and  specifically involving the right hippocampus on routine earlier MR  imaging.. Postcontrast imaging is obtained. FLAIR sagittal and axial  sequences as well as high-resolution imaging through the temporal lobes  are also obtained. There is atrophy of the brain with prominence of the  subarachnoid spaces and ventricular enlargement. Multiple foci of T2  abnormalities are are present involving the periventricular and higher  white matter tracts suggesting small vessel ischemic change. There is  asymmetric increased T2 signal on FLAIR sequencing within the mesial  right temporal lobe and specifically  in the region of the right  hippocampus. This is confirmed on thin section high-resolution T2  imaging in the coronal plane. This does correspond to the area of  restricted diffusion and abnormal FLAIR signal on routine imaging  obtained earlier today. There is no associated mass effect or abnormal  contrast enhancement. I would favor that this finding is related to the  patient's postictal state. FLAIR and diffusion abnormalities can be  demonstrated in the immediate postictal time frame. Acute infarct in  this distribution is considered less likely. Short interval follow-up  imaging will be recommended.       Impression:      1.. As demonstrated on routine imaging through the brain there is again  asymmetric T2 signal involving the mesial right temporal lobe and  specifically the hippocampus. There is no associated abnormal contrast  enhancement. A neoplasm is considered unlikely and I suspect this  represents increased T2 signal and diffusion restriction related to the  patient's postictal state. Acute ischemia/infarct is considered less  likely. Short interval follow-up imaging will be recommended to assure  stability and/or likely resolution of this finding.  2. There is mild atrophy and small vessel disease. No foci of abnormal  contrast enhancement are demonstrated.  This report was finalized on 11/14/2020 18:56 by Dr. Emmett Parson MD.    MRI Brain Without Contrast [[id number]] Collected: 11/14/20 1452     Updated: 11/14/20 1512    Narrative:      Indication: numbness around mouth and small fingers to the left hand        Technique: Multisequence, multiplanar MRI of the brain without contrast.     Comparison: CT scan dated 11/14/2020     Findings:      Abnormal diffusion signal hyperintensity diffusely along the right  hippocampus. Associated T2 FLAIR hyperintensity identified along the  left hippocampus. No T2 FLAIR abnormality identified at the temporal  pole. The contralateral mesial temporal lobe is  unremarkable. Underlying  mild chronic small vessel ischemic change. No intra-axial or extra-axial  hemorrhage. No intracranial mass lesion or visualized mass effect. The  ventricles, cortical sulci and basal cisterns are symmetric and age  appropriate. Posterior fossa structures are unremarkable. Pituitary  gland and sella are unremarkable. The major intracranial flow-voids are  preserved. Orbital contents are unremarkable. Chronic mucosal thickening  along the floors of the maxillary sinuses. Mastoid air cells are clear.  Nonspecific T2 FLAIR hyperintensity left of midline in the frontal bone.  Inner and outer table of the cortical bone appears intact and there is  no lytic lesion in this area identified on today's CT scan.        Impression:      Impression:     1. Abnormal diffusion restriction identified diffusely along the right  hippocampus. Favor post ictal change over hippocampal infarct given the  diffuse nature of the signal abnormality along the hippocampus as  compared with a focal involvement which is more typical of infarct.  2. I do not see an obvious intra-axial mass on this exam. Postcontrast  imaging could be considered to evaluate for enhancing lesion or  suspicious pachymeningeal enhancement.     Findings and recommendations were discussed with MATIAS HENSLEY on  11/14/2020 at 3:09 PM CST.  This report was finalized on 11/14/2020 15:09 by Dr Ryder Castaneda, .    CT Head Without Contrast [[id number]] Collected: 11/14/20 1246     Updated: 11/14/20 1251    Narrative:      CT HEAD WO CONTRAST- 11/14/2020 12:23 PM CST     HISTORY: numbness around mouth and small fingers in left hand       DOSE LENGTH PRODUCT: 608 mGy cm. Automated exposure control was also  utilized to decrease patient radiation dose.     Technique:   Axial CT of the brain without IV contrast. Sagittal and coronal  reformations are also provided for review. Soft tissue and bone kernels  are available for interpretation.      Comparison: None.     Findings:      There is no evidence of acute large vascular territory infarct. No  intra-axial or extra-axial hemorrhage. No visualized mass lesion or mass  effect. The ventricles, cortical sulci and basal cisterns are symmetric  and age appropriate.  Posterior fossa structures are unremarkable. The  scalp and calvarium are intact. Visualized paranasal sinuses and  mastoids are clear.        Impression:      Impression:    1. No acute intracranial process.  This report was finalized on 11/14/2020 12:48 by Dr Ryder Castaneda, .        Results for orders placed during the hospital encounter of 11/14/20   Adult Transthoracic Echo Complete W/ Cont if Necessary Per Protocol (With Agitated Saline)    Narrative · Left ventricular ejection fraction appears to be 56 - 60%. Left   ventricular systolic function is normal.  · Left ventricular wall thickness is consistent with moderate concentric   hypertrophy.  · Left ventricular diastolic function is consistent with (grade Ia w/high   LAP) impaired relaxation.  · Normal size and function of the right ventricle.  · No significant valvular pathology.  · Saline test results are negative.        Telemetry: Sinus 60 to 90 with PVC's couplets and trigeminy    Assessment/Plan     Hospital Problem List      Transient ischemic attack (TIA)    Obesity (BMI 30-39.9)    CAD (coronary artery disease)    Nocturnal hypoxemia    Type 2 diabetes mellitus (CMS/HCC)    Impression:  1. TIA  2. Abnormal MRI raising the question of seizure when patient denying any seizures or any history to suggest this as a possibility.  However EEG had some bilateral temporal slowing at times but that's not unremarkable for age  No clear seizurogenic potentials.  /there was electrode artifact on T4 which is the area of interest  3. Witnessed apnea and somewhat loud snoring. Concern for obstructive sleep apnea and this could cause seizures especially if C02 high.   Overnight pulse ox done  last PM shows more than 29 minutes below 90% and more than 22 minutes below 88% with  68 desaturations reported.  4. Hyperlipidemia with LDL of 67 and goal of < 70.  5. DM with good control and hemoglobin A1C of 6.4 and goal of < 7.0   6. Telemetry showing PVC's, PAC's and trigeminy    Plan:  1. Keppra 500 mg every 12 hours  Change to PO  2. EEG being repeated today.  3. Plavix added to ASA 81 mg daily for dual therapy for 3 weeks and then Plavix monotherapy after.  4. Will need outpatient polysomnogram.  5. Repeat MRI brain with and without in 3-6 weeks.   6. Per PT notes patient having impaired gait and imbalance. She is likely a fall risk and will  need home health PT. Patient refusing to use a walker.       Addendum: Repeat EEG showed a couple of sharp transients that reversed at T4 (right temporal) and once at P4 (right parietal)  which may be a potential seizure focus and in a territory not well seen on previous EEG due to electrode artifact.  This is not definitive as it only occurred a couple of times      For now I would keep her on Keppra 500 mg BID.   Will need outpatient neurology clinic follow up --appointment made with Dr Beach for 12/30/20  at 3 PM      Katy Alexis MD  11/16/20  14:29 CST

## 2020-11-16 NOTE — THERAPY DISCHARGE NOTE
Acute Care - Speech Language Pathology Initial Evaluation/Discharge  [facility]     Patient Name: Pascale Jimenez  : 1945  MRN: [id number]  Today's Date: 2020               Admit Date: 2020   Communication evaluation completed. The patient is alert and cooperative. Primary problem: Stroke-like symptoms/possible seizure. The patient is able to complete expressive language and auditory comprehension tasks with no difficulty. Cognitive tasks completed with accuracy. The patient presents with functional speech, language, and cognition. No impairments present which warrant skilled speech intervention. SLP signing off at this time. If acute changes please reconsult.   Marilu ROMARIO Montoya, MS CCC-SLP 2020 13:26 CST    Visit Dx:    ICD-10-CM ICD-9-CM   1. Stroke-like symptoms  R29.90 781.99   2. Abnormal MRI  R93.89 793.99   3. Impaired mobility  Z74.09 799.89   4. Decreased activities of daily living (ADL)  Z78.9 V49.89   5. Abnormal finding on MRI of brain  R90.89 793.0   6. Speech disturbance, unspecified type  R47.9 784.59     Patient Active Problem List   Diagnosis   • Compression fracture of body of thoracic vertebra (CMS/HCC)   • BMI 35.0-35.9,adult   • Former smoker   • Acute bilateral low back pain without sciatica   • Stroke-like symptoms   • CAD (coronary artery disease)   • Nocturnal hypoxemia     Past Medical History:   Diagnosis Date   • Colon polyp    • Diabetes mellitus (CMS/HCC)    • Diverticulitis    • Duodenal ulcer    • GERD (gastroesophageal reflux disease)    • Hemorrhoids    • Histoplasmosis    • Hyperlipidemia    • Hypertension      Past Surgical History:   Procedure Laterality Date   • APPENDECTOMY     • CHOLECYSTECTOMY     • COLONOSCOPY  2014   • ENDOSCOPY  2015   • HYSTERECTOMY     • REPLACEMENT TOTAL KNEE     • SPINE SURGERY     • TOTAL HIP ARTHROPLASTY Bilateral     x 2   • TUBAL ABDOMINAL LIGATION            SLP EVALUATION (last 72 hours)      SLP SLC  Evaluation     Row Name 11/16/20 1225                   Communication Assessment/Intervention    Document Type  evaluation  -MM        Subjective Information  no complaints  -MM        Patient Observations  alert;cooperative;agree to therapy  -MM        Patient/Family/Caregiver Comments/Observations  No family present.   -MM        Care Plan Review  evaluation/treatment results reviewed  -MM        Patient Effort  good  -MM        Symptoms Noted During/After Treatment  none  -MM           General Information    Patient Profile Reviewed  yes  -MM        Pertinent History Of Current Problem  Primary problem: Stroke-like symptoms/possible seizure.   -MM        Precautions/Limitations, Vision  WFL;for purposes of eval  -MM        Precautions/Limitations, Hearing  WFL;for purposes of eval  -MM        Prior Level of Function-Communication  WFL  -MM        Plans/Goals Discussed with  patient  -MM        Barriers to Rehab  none identified  -MM        Patient's Goals for Discharge  return to all previous roles/activities  -MM           Pain    Additional Documentation  Pain Scale: FACES Pre/Post-Treatment (Group)  -MM           Pain Scale: FACES Pre/Post-Treatment    Pain: FACES Scale, Pretreatment  0-->no hurt  -MM        Posttreatment Pain Rating  0-->no hurt  -MM           Comprehension Assessment/Intervention    Comprehension Assessment/Intervention  Auditory Comprehension  -MM           Auditory Comprehension Assessment/Intervention    Auditory Comprehension (Communication)  WFL  -MM        Able to Identify Objects/Pictures (Communication)  body part;familiar objects;WFL  -MM        Answers Questions (Communication)  yes/no;personal;WFL  -MM        Able to Follow Commands (Communication)  1-step;multi-step;WFL  -MM        Narrative Discourse  conversational level;WFL  -MM           Expression Assessment/Intervention    Expression Assessment/Intervention  verbal expression  -MM           Verbal Expression  Assessment/Intervention    Verbal Expression  WFL  -MM        Automatic Speech (Communication)  response to greeting;counting 1-20;WFL  -MM        Repetition  words;WFL  -MM        Phrase Completion  automatic/predictable;unpredictable;WFL  -MM        Confrontational Naming  high frequency;low frequency;WFL  -MM        Sentence Formulation  simple;WFL  -MM        Conversational Discourse/Fluency  WFL  -MM           Oral Motor Structure and Function    Oral Motor Structure and Function  WFL  -MM           Oral Musculature and Cranial Nerve Assessment    Oral Motor General Assessment  WFL  -MM           Motor Speech Assessment/Intervention    Motor Speech Function  WFL  -MM           SLP Clinical Impressions    SLP Diagnosis  Functional speech, language, and cognition   -MM        [facility] Criteria for Skilled Therapy Interventions Met  no problems identified which require skilled intervention  -MM        Functional Impact  no impact on function  -MM           Recommendations    Therapy Frequency (SLP SLC)  evaluation only  -MM        Predicted Duration Therapy Intervention (Days)  until discharge  -MM        Anticipated Discharge Disposition (SLP)  unknown  -MM           SLP Discharge Summary    Discharge Destination  unknown  -MM        Discharge Diagnostic Statement  Reconsult if acute changes  -MM        Progress Toward Achieving Short/long Term Goals  all goals met within established timelines  -MM        Reason for Discharge  all goals and outcomes met, no further needs identified  -          User Key  (r) = Recorded By, (t) = Taken By, (c) = Cosigned By    Initials Name Effective Dates    MM Marilu Montoya MS CCC-SLP 07/12/20 -            EDUCATION  The patient has been educated in the following areas:   Cognitive Impairment Communication Impairment.      SLP Recommendation and Plan  SLP Diagnosis: Functional speech, language, and cognition      [facility] Criteria for Skilled Therapy Interventions Met: no problems  identified which require skilled intervention  Anticipated Discharge Disposition (SLP): unknown     Predicted Duration Therapy Intervention (Days): until discharge              Reason for Discharge: all goals and outcomes met, no further needs identified    Plan of Care Reviewed With: patient  Progress: no change(Initial Evaluation)  Outcome Summary: Communication evaluation completed. The patient is alert and cooperative. Primary problem: Stroke-like symptoms/possible seizure. The patient is able to complete expressive language and auditory comprehension tasks with no difficulty. Cognitive tasks completed with accuracy. The patient presents with functional speech, language, and cognition. No impairments present which warrant skilled speech intervention. SLP signing off at this time. If acute changes please reconsult.              Time Calculation:   Time Calculation- SLP     Row Name 11/16/20 1321             Time Calculation- SLP    SLP Start Time  1225  -MM      SLP Stop Time  1321  -MM      SLP Time Calculation (min)  56 min  -MM      SLP Received On  11/16/20  -MM        User Key  (r) = Recorded By, (t) = Taken By, (c) = Cosigned By    Initials Name Provider Type    Marilu Barbosa MS CCC-SLP Speech and Language Pathologist          Therapy Charges for Today     Code Description Service Date Service Provider Modifiers Qty    06350325883  ST EVAL SPEECH AND PROD W LANG  4 11/16/2020 Marilu Montoya MS CCC-SLP GN 1                   SLP Discharge Summary  Anticipated Discharge Disposition (SLP): unknown  Reason for Discharge: all goals and outcomes met, no further needs identified  Progress Toward Achieving Short/long Term Goals: all goals met within established timelines  Discharge Destination: unknown(Remains in acute care)    MS MORA Junior  11/16/2020

## 2020-11-16 NOTE — PLAN OF CARE
"  Problem: Adult Inpatient Plan of Care  Goal: Plan of Care Review  Recent Flowsheet Documentation  Taken 11/16/2020 0728 by Kesha Knutson, OTR/L, CNT  Progress: improving  Plan of Care Reviewed With: patient  Outcome Summary: OT evaluation completed.  Pt demo no need for skilled OT services at this time.  She completed LB dressing of socks sitting in chair and toileting independently.  She completed functional transfers independently and functional mobility in the hallway with S.  Pt reported she is walking \"normal\" at her baseline.  OT will sign off at this time and defer balance and functional mobility goals to PT at this time.  It is recommended for pt to discharge home upon discharge.     "

## 2020-11-16 NOTE — THERAPY DISCHARGE NOTE
Acute Care - Occupational Therapy Discharge  [facility]    Patient Name: Pascale Jimenez  : 1945    MRN: [id number]                              Today's Date: 2020       Admit Date: 2020    Visit Dx:     ICD-10-CM ICD-9-CM   1. Stroke-like symptoms  R29.90 781.99   2. Abnormal MRI  R93.89 793.99   3. Impaired mobility  Z74.09 799.89   4. Decreased activities of daily living (ADL)  Z78.9 V49.89     Patient Active Problem List   Diagnosis   • Compression fracture of body of thoracic vertebra (CMS/HCC)   • BMI 35.0-35.9,adult   • Former smoker   • Acute bilateral low back pain without sciatica   • Stroke-like symptoms   • CAD (coronary artery disease)     Past Medical History:   Diagnosis Date   • Colon polyp    • Diabetes mellitus (CMS/HCC)    • Diverticulitis    • Duodenal ulcer    • GERD (gastroesophageal reflux disease)    • Hemorrhoids    • Histoplasmosis    • Hyperlipidemia    • Hypertension      Past Surgical History:   Procedure Laterality Date   • APPENDECTOMY     • CHOLECYSTECTOMY     • COLONOSCOPY  2014   • ENDOSCOPY  2015   • HYSTERECTOMY     • REPLACEMENT TOTAL KNEE     • SPINE SURGERY     • TOTAL HIP ARTHROPLASTY Bilateral     x 2   • TUBAL ABDOMINAL LIGATION       General Information     Row Name 20 0728          OT Time and Intention    Document Type  evaluation numbness L 4th and 5th digits.  Unsteady gait.  DX: seizures vs TIA  -CS     Mode of Treatment  physical therapy  -CS     Row Name 20 0728          General Information    Patient Profile Reviewed  yes  -CS     Prior Level of Function  independent:;ADL's;all household mobility;community mobility;cleaning;cooking;home management  -CS     Barriers to Rehab  none identified  -CS     Row Name 20 0728          Living Environment    Lives With  alone  -CS     Row Name 20 0728          Home Main Entrance    Number of Stairs, Main Entrance  six  -CS     Stair Railings, Main Entrance  railings on both  sides of stairs  -     Row Name 11/16/20 0728          Stairs Within Home, Primary    Stairs, Within Home, Primary  12 stairs into basement  -     Row Name 11/16/20 0728          Cognition    Orientation Status (Cognition)  oriented x 4  -CS       User Key  (r) = Recorded By, (t) = Taken By, (c) = Cosigned By    Initials Name Provider Type     Kesha Knutson, OTR/L, GONZALO Occupational Therapist        Mobility/ADL's     Row Name 11/16/20 0728          Bed Mobility    Comment (Bed Mobility)  pt sitting in bedside chair  -     Row Name 11/16/20 0728          Transfers    Transfers  sit-stand transfer  -     Sit-Stand [place] (Transfers)  independent  -     Row Name 11/16/20 0728          Functional Mobility    Functional Mobility- Ind. Level  supervision required  -     Functional Mobility- Comment  Pt walked in hallway and back to bedside chair.  Pt demo no reaching outside of her base of support for UE support  -     Row Name 11/16/20 0728          Activities of Daily Living    BADL Assessment/Intervention  lower body dressing;toileting  -     Row Name 11/16/20 0728          Lower Body Dressing Assessment/Training    [place] Level (Lower Body Dressing)  don;doff;socks;independent  -CS     Position (Lower Body Dressing)  edge of bed sitting  -     Row Name 11/16/20 0728          Toileting Assessment/Training    [place] Level (Toileting)  toileting skills;independent  -CS     Assistive Devices (Toileting)  commode  -       User Key  (r) = Recorded By, (t) = Taken By, (c) = Cosigned By    Initials Name Provider Type    Kesha Quinones, OTR/L, GONZALO Occupational Therapist        Obj/Interventions     Row Name 11/16/20 0728          Sensory Assessment (Somatosensory)    Sensory Assessment (Somatosensory)  sensation intact  -     Row Name 11/16/20 0728          Sensory Interventions    Comment, Sensory Intervention  Pt reports diminished sensation to L 4th and 5th digits.  Light  "and deep touch localization intact.  -     Row Name 11/16/20 0728          Vision Assessment/Intervention    Visual Impairment/Limitations  corrective lenses full-time  -[facility] Name 11/16/20 0728          Range of Motion Comprehensive    General Range of Motion  bilateral upper extremity ROM WNL  -[facility] Name 11/16/20 0728          Strength Comprehensive (MMT)    Comment, General Manual Muscle Testing (MMT) Assessment  BUE: 4+/5  -[facility] Name 11/16/20 0728          Balance    Balance Assessment  sitting static balance;sitting dynamic balance;standing static balance;standing dynamic balance  -     Static Sitting Balance  WNL;sitting, edge of bed  -CS     Dynamic Sitting Balance  WNL;sitting, edge of bed  -     Static Standing Balance  WNL  -     Dynamic Standing Balance  WNL  -CS       User Key  (r) = Recorded By, (t) = Taken By, (c) = Cosigned By    Initials Name Provider Type    CS Kesha Knutson, OTR/L, CNT Occupational Therapist        Goals/Plan    No documentation.       Clinical Impression     [facility] Name 11/16/20 0728          Pain Assessment    Additional Documentation  Pain Scale: Numbers Pre/Post-Treatment (Group)  -CS     Row Name 11/16/20 0728          Pain Scale: Numbers Pre/Post-Treatment    Pretreatment Pain Rating  0/10 - no pain  -CS     Posttreatment Pain Rating  0/10 - no pain  -[facility] Name 11/16/20 0728          Plan of Care Review    Plan of Care Reviewed With  patient  -CS     Progress  improving  -     Outcome Summary  OT evaluation completed.  Pt demo no need for skilled OT services at this time.  She completed LB dressing of socks sitting in chair and toileting independently.  She completed functional transfers independently and functional mobility in the hallway with S.  Pt reported she is walking \"normal\" at her baseline.  OT will sign off at this time and defer balance and functional mobility goals to PT at this time.  It is recommended for pt to discharge home " upon discharge.  -     Row Name 11/16/20 0728          Therapy Assessment/Plan (OT)    Patient/Family Therapy Goal Statement (OT)  to go home  -CS     Criteria for Skilled Therapeutic Interventions Met (OT)  no;no problems identified which require skilled intervention;does not meet criteria for skilled intervention  -CS     Therapy Frequency (OT)  evaluation only  -     Row Name 11/16/20 0728          Therapy Plan Review/Discharge Plan (OT)    Anticipated Discharge Disposition (OT)  home with assist  -[facility] Name 11/16/20 0728          Positioning and Restraints    Pre-Treatment Position  sitting in chair/recliner  -CS     Post Treatment Position  chair  -CS     In Chair  sitting;call light within reach;encouraged to call for assist;exit alarm on  -CS       User Key  (r) = Recorded By, (t) = Taken By, (c) = Cosigned By    Initials Name Provider Type    Kesha Quinones, OTR/L, CNT Occupational Therapist        Outcome Measures     [facility] Name 11/16/20 0728          How much help from another is currently needed...    Putting on and taking off regular lower body clothing?  4  -CS     Bathing (including washing, rinsing, and drying)  4  -CS     Toileting (which includes using toilet bed pan or urinal)  4  -CS     Putting on and taking off regular upper body clothing  4  -CS     Taking care of personal grooming (such as brushing teeth)  4  -CS     Eating meals  4  -CS     AM-PAC 6 Clicks Score (OT)  24  -[facility] Name 11/16/20 0728          Modified [place] Scale    Pre-Stroke Modified [place] Scale  0 - No Symptoms at all.  -CS     Modified [place] Scale  1 - No significant disability despite symptoms.  Able to carry out all usual duties and activities.  -     Row Name 11/16/20 0728          Functional Assessment    Outcome Measure Options  AM-PAC 6 Clicks Daily Activity (OT)  -CS       User Key  (r) = Recorded By, (t) = Taken By, (c) = Cosigned By    Initials Name Provider Type    Kesha Quinones OTR/L,  "CNT Occupational Therapist        Occupational Therapy Education                 Title: PT OT SLP Therapies (In Progress)     Topic: Occupational Therapy (In Progress)     Point: ADL training (Done)     Description:   Instruct learner(s) on proper safety adaptation and remediation techniques during self care or transfers.   Instruct in proper use of assistive devices.              Learning Progress Summary           Patient Acceptance, E, VU by MIR at 11/16/2020 0822                   Point: Home exercise program (Not Started)     Description:   Instruct learner(s) on appropriate technique for monitoring, assisting and/or progressing therapeutic exercises/activities.              Learner Progress:  Not documented in this visit.          Point: Precautions (Not Started)     Description:   Instruct learner(s) on prescribed precautions during self-care and functional transfers.              Learner Progress:  Not documented in this visit.          Point: Body mechanics (Not Started)     Description:   Instruct learner(s) on proper positioning and spine alignment during self-care, functional mobility activities and/or exercises.              Learner Progress:  Not documented in this visit.                      User Key     Initials Effective Dates Name Provider Type Discipline     04/02/19 -  Kesha Knutson, OTR/L, CNT Occupational Therapist OT              OT Recommendation and Plan  Retired Outcome Summary/Treatment Plan (OT)  Anticipated Discharge Disposition (OT): home with assist  Therapy Frequency (OT): evaluation only  Plan of Care Review  Plan of Care Reviewed With: patient  Progress: improving  Outcome Summary: OT evaluation completed.  Pt demo no need for skilled OT services at this time.  She completed LB dressing of socks sitting in chair and toileting independently.  She completed functional transfers independently and functional mobility in the hallway with S.  Pt reported she is walking \"normal\" at her " "baseline.  OT will sign off at this time and defer balance and functional mobility goals to PT at this time.  It is recommended for pt to discharge home upon discharge.  Plan of Care Reviewed With: patient  Outcome Summary: OT evaluation completed.  Pt demo no need for skilled OT services at this time.  She completed LB dressing of socks sitting in chair and toileting independently.  She completed functional transfers independently and functional mobility in the hallway with S.  Pt reported she is walking \"normal\" at her baseline.  OT will sign off at this time and defer balance and functional mobility goals to PT at this time.  It is recommended for pt to discharge home upon discharge.     Time Calculation:   Time Calculation- OT     Row Name 11/16/20 0821             Time Calculation- OT    OT Start Time  0728  -CS      OT Stop Time  0806  -      OT Time Calculation (min)  38 min  -CS      OT Received On  11/16/20  -        User Key  (r) = Recorded By, (t) = Taken By, (c) = Cosigned By    Initials Name Provider Type    CS Kesha Knutson OTR/L, GONZALO Occupational Therapist        Therapy Charges for Today     Code Description Service Date Service Provider Modifiers Qty    [id number]  OT EVAL LOW COMPLEXITY 3 11/16/2020 Kesha Knutson OTR/L, GONZALO GO 1               MARYCRUZ Guardado/L, CNT  11/16/2020    "

## 2020-11-16 NOTE — PLAN OF CARE
Pt agreeable to therapy. Pt is independent with transfers; bed mobility not performed today. She ambulated 250' x2 with CGA. Pt c/o difficulty using FWW during ambulation d/t faulty wheel alignment that resulted in hard pulling to R side; Min A was required to prevent FWW from drifting R. Pt possibly able to ambulate better without AD. Pt would benefit from further skilled therapy for BLE strength upon d/c.

## 2020-11-17 ENCOUNTER — READMISSION MANAGEMENT (OUTPATIENT)
Dept: CALL CENTER | Facility: HOSPITAL | Age: 75
End: 2020-11-17

## 2020-11-17 NOTE — THERAPY DISCHARGE NOTE
Acute Care - Physical Therapy Discharge Summary  [facility]       Patient Name: Pascale Jimenez  : 1945  MRN: [id number]    Today's Date: 2020                 Admit Date: 2020      PT Recommendation and Plan    Visit Dx:    ICD-10-CM ICD-9-CM   1. Stroke-like symptoms  R29.90 781.99   2. Abnormal MRI  R93.89 793.99   3. Impaired mobility  Z74.09 799.89   4. Decreased activities of daily living (ADL)  Z78.9 V49.89   5. Abnormal finding on MRI of brain  R90.89 793.0   6. Speech disturbance, unspecified type  R47.9 784.59   7. Nocturnal hypoxemia  G47.34 327.24               Rehab Goal Summary     Row Name 20 0734             Bed Mobility Goal 1 (PT)    Activity/Assistive Device (Bed Mobility Goal 1, PT)  bed mobility activities, all  -MF      [place] Level/Cues Needed (Bed Mobility Goal 1, PT)  independent  -MF      Time Frame (Bed Mobility Goal 1, PT)  long term goal (LTG);by discharge  -MF      Progress/Outcomes (Bed Mobility Goal 1, PT)  goal not met  -MF         Transfer Goal 1 (PT)    Activity/Assistive Device (Transfer Goal 1, PT)  sit-to-stand/stand-to-sit;bed-to-chair/chair-to-bed  -MF      [place] Level/Cues Needed (Transfer Goal 1, PT)  independent  -MF      Time Frame (Transfer Goal 1, PT)  long term goal (LTG);by discharge  -MF      Progress/Outcome (Transfer Goal 1, PT)  goal met  -MF         Gait Training Goal 1 (PT)    Activity/Assistive Device (Gait Training Goal 1, PT)  gait (walking locomotion);assistive device use;backward stepping;decrease fall risk;forward stepping;improve balance and speed;increase endurance/gait distance;walker, rolling  -MF      [place] Level (Gait Training Goal 1, PT)  modified independence  -MF      Distance (Gait Training Goal 1, PT)  200ft  -MF      Time Frame (Gait Training Goal 1, PT)  long term goal (LTG);by discharge  -MF      Progress/Outcome (Gait Training Goal 1, PT)  goal not met  -MF         Stairs Goal 1 (PT)     Activity/Assistive Device (Stairs Goal 1, PT)  ascending stairs;descending stairs;step-to-step;using handrail, left;using handrail, right  -      [place] Level/Cues Needed (Stairs Goal 1, PT)  modified independence  -      Number of Stairs (Stairs Goal 1, PT)  6  -MF      Time Frame (Stairs Goal 1, PT)  long term goal (LTG);by discharge  -      Progress/Outcome (Stairs Goal 1, PT)  goal not met  -        User Key  (r) = Recorded By, (t) = Taken By, (c) = Cosigned By    Initials Name Provider Type Discipline     Beverley Campbell, PTA Physical Therapy Assistant PT              PT Discharge Summary  Anticipated Discharge Disposition (PT): home with home health  Reason for Discharge: Discharge from facility  Outcomes Achieved: Patient able to partially acheive established goals  Discharge Destination: Home with home health      Beverley Campbell PTA   11/17/2020

## 2020-11-17 NOTE — PLAN OF CARE
Goal Outcome Evaluation:  Plan of Care Reviewed With: patient, son  Progress: no change(Initial Evaluation)  Outcome Summary: Son, Maggie, at bedside when DC instructions were given along with ambulatory scripts for home health, sleep study, and home oxygen. Patient very nervous about taking Keppra- also stated she has a side effect to Lipitor and won't take it. Instructed best to call and make a FU with her PCP Dr. Landeros to discuss further information. NIH=0, no c/o tingling in lips only slight sensation of tingling to the tip of her left pointer finger (only at times). Gait slighty unsteady. States her grand-daughter will come and stay with her tonight, and her 2 sons live within 2 miles.

## 2020-11-17 NOTE — OUTREACH NOTE
Prep Survey      Responses   [facility] patient discharged from?  [facility]   Is LACE score < 7 ?  Yes   Eligibility  Readm Mgmt   Discharge diagnosis  Transient ischemic attack    Does the patient have one of the following disease processes/diagnoses(primary or secondary)?  Stroke (TIA)   Does the patient have Home health ordered?  No   Is there a DME ordered?  No   Prep survey completed?  Yes          Katheryn Marquez RN

## 2020-11-20 ENCOUNTER — READMISSION MANAGEMENT (OUTPATIENT)
Dept: CALL CENTER | Facility: HOSPITAL | Age: 75
End: 2020-11-20

## 2020-11-20 NOTE — OUTREACH NOTE
Stroke Week 1 Survey      Responses   [facility] patient discharged from?  [facility]   Does the patient have one of the following disease processes/diagnoses(primary or secondary)?  Stroke (TIA)   Week 1 attempt successful?  Yes   Call start time  1704   Call end time  1710   Medication alerts for this patient  has gotten medication   Meds reviewed with patient/caregiver?  Yes   Is the patient having any side effects they believe may be caused by any medication additions or changes?  Yes   Does the patient have all medications ordered at discharge?  Yes   Is the patient taking all medications as directed (includes completed medication regime)?  Yes   Comments regarding appointments  has seen Dr. Landeros   Does the patient have a primary care provider?   Yes   Comments  changed to [facility]   Has home health visited the patient within 72 hours of discharge?  N/A   Does the patient require any assistance with activities of daily living such as eating, bathing, dressing, walking, etc.?  Yes   Does the patient have any residual symptoms from stroke/TIA?  No   Does the patient understand the diet ordered at discharge?  Yes   Comments  needing  sleep apnea study   Did the patient receive a copy of their discharge instructions?  Yes   Nursing interventions  Reviewed instructions with patient   What is the patient's perception of their health status since discharge?  Improving   Is the patient/caregiver able to teach back the risk factors for a stroke?  High blood pressure-goal below 120/80, Sleep apnea [reviewed with the patietn]   Is the patient/caregiver able to teach back signs and symptoms related to disease process for when to call PCP?  Yes [reviewed with the patient]   Is the patient/caregiver able to teach back signs and symptoms related to disease process for when to call 911?  Yes   If the patient is a current smoker, are they able to teach back resources for cessation?  Not a smoker   Week 1 call completed?   Yes   Wrap up additional comments  doing well, unable to get her sleep apnea study, the physican who reads them has SHARIF Ulloa RN

## 2020-11-21 PROBLEM — R56.9 SEIZURES (HCC): Status: ACTIVE | Noted: 2020-11-21

## 2020-11-30 ENCOUNTER — APPOINTMENT (OUTPATIENT)
Dept: CT IMAGING | Facility: HOSPITAL | Age: 75
End: 2020-11-30

## 2020-11-30 ENCOUNTER — HOSPITAL ENCOUNTER (OUTPATIENT)
Dept: MRI IMAGING | Facility: HOSPITAL | Age: 75
Discharge: HOME | End: 2020-11-30
Admitting: CLINICAL NURSE SPECIALIST

## 2020-11-30 ENCOUNTER — HOSPITAL ENCOUNTER (INPATIENT)
Facility: HOSPITAL | Age: 75
LOS: 1 days | Discharge: HOME | End: 2020-12-01
Attending: FAMILY MEDICINE | Admitting: FAMILY MEDICINE

## 2020-11-30 DIAGNOSIS — I63.9 CEREBROVASCULAR ACCIDENT (CVA), UNSPECIFIED MECHANISM (HCC): Primary | ICD-10-CM

## 2020-11-30 DIAGNOSIS — R29.90 STROKE-LIKE SYMPTOMS: ICD-10-CM

## 2020-11-30 DIAGNOSIS — Z78.9 DECREASED ACTIVITIES OF DAILY LIVING (ADL): ICD-10-CM

## 2020-11-30 DIAGNOSIS — R90.89 ABNORMAL FINDING ON MRI OF BRAIN: ICD-10-CM

## 2020-11-30 LAB
ALBUMIN SERPL-MCNC: 4.1 G/DL (ref 3.5–5.2)
ALBUMIN/GLOB SERPL: 1.3 G/DL
ALP SERPL-CCNC: 74 U/L (ref 39–117)
ALT SERPL W P-5'-P-CCNC: 16 U/L (ref 1–33)
ANION GAP SERPL CALCULATED.3IONS-SCNC: 9 MMOL/L (ref 5–15)
APTT PPP: 29.6 SECONDS (ref 24.1–35)
AST SERPL-CCNC: 18 U/L (ref 1–32)
BASOPHILS # BLD AUTO: 0.01 10*3/MM3 (ref 0–0.2)
BASOPHILS NFR BLD AUTO: 0.2 % (ref 0–1.5)
BILIRUB SERPL-MCNC: 0.5 MG/DL (ref 0–1.2)
BUN SERPL-MCNC: 10 MG/DL (ref 8–23)
BUN/CREAT SERPL: 19.2 (ref 7–25)
CALCIUM SPEC-SCNC: 9.4 MG/DL (ref 8.6–10.5)
CHLORIDE SERPL-SCNC: 102 MMOL/L (ref 98–107)
CO2 SERPL-SCNC: 28 MMOL/L (ref 22–29)
CREAT SERPL-MCNC: 0.52 MG/DL (ref 0.57–1)
CRP SERPL-MCNC: 2.08 MG/DL (ref 0–0.5)
DEPRECATED RDW RBC AUTO: 41.1 FL (ref 37–54)
EOSINOPHIL # BLD AUTO: 0.02 10*3/MM3 (ref 0–0.4)
EOSINOPHIL NFR BLD AUTO: 0.3 % (ref 0.3–6.2)
ERYTHROCYTE [DISTWIDTH] IN BLOOD BY AUTOMATED COUNT: 12.7 % (ref 12.3–15.4)
ERYTHROCYTE [SEDIMENTATION RATE] IN BLOOD: 8 MM/HR (ref 0–20)
GFR SERPL CREATININE-BSD FRML MDRD: 115 ML/MIN/1.73
GLOBULIN UR ELPH-MCNC: 3.2 GM/DL
GLUCOSE BLDC GLUCOMTR-MCNC: 98 MG/DL (ref 70–130)
GLUCOSE SERPL-MCNC: 124 MG/DL (ref 65–99)
HCT VFR BLD AUTO: 33.8 % (ref 34–46.6)
HCT VFR BLD AUTO: 37.6 % (ref 34–46.6)
HGB BLD-MCNC: 12.8 G/DL (ref 12–15.9)
IMM GRANULOCYTES # BLD AUTO: 0.02 10*3/MM3 (ref 0–0.05)
IMM GRANULOCYTES NFR BLD AUTO: 0.3 % (ref 0–0.5)
INR PPP: 1 (ref 0.91–1.09)
LYMPHOCYTES # BLD AUTO: 0.88 10*3/MM3 (ref 0.7–3.1)
LYMPHOCYTES NFR BLD AUTO: 13.4 % (ref 19.6–45.3)
MCH RBC QN AUTO: 30.1 PG (ref 26.6–33)
MCHC RBC AUTO-ENTMCNC: 34 G/DL (ref 31.5–35.7)
MCV RBC AUTO: 88.5 FL (ref 79–97)
MONOCYTES # BLD AUTO: 0.62 10*3/MM3 (ref 0.1–0.9)
MONOCYTES NFR BLD AUTO: 9.5 % (ref 5–12)
NEUTROPHILS NFR BLD AUTO: 5.01 10*3/MM3 (ref 1.7–7)
NEUTROPHILS NFR BLD AUTO: 76.3 % (ref 42.7–76)
NRBC BLD AUTO-RTO: 0 /100 WBC (ref 0–0.2)
PA ADP PRP-ACNC: 204 PRU (ref 194–418)
PLATELET # BLD AUTO: 243 10*3/MM3 (ref 140–450)
PLATELET # BLD AUTO: 266 10*3/MM3 (ref 140–450)
PMV BLD AUTO: 9.8 FL (ref 6–12)
POTASSIUM SERPL-SCNC: 4 MMOL/L (ref 3.5–5.2)
PROT SERPL-MCNC: 7.3 G/DL (ref 6–8.5)
PROTHROMBIN TIME: 12.8 SECONDS (ref 11.9–14.6)
RBC # BLD AUTO: 4.25 10*6/MM3 (ref 3.77–5.28)
SARS-COV-2 RNA PNL SPEC NAA+PROBE: DETECTED
SODIUM SERPL-SCNC: 139 MMOL/L (ref 136–145)
WBC # BLD AUTO: 6.56 10*3/MM3 (ref 3.4–10.8)

## 2020-11-30 PROCEDURE — 0 IOPAMIDOL PER 1 ML: Performed by: FAMILY MEDICINE

## 2020-11-30 PROCEDURE — 99222 1ST HOSP IP/OBS MODERATE 55: CPT | Performed by: PSYCHIATRY & NEUROLOGY

## 2020-11-30 PROCEDURE — 70498 CT ANGIOGRAPHY NECK: CPT

## 2020-11-30 PROCEDURE — 82962 GLUCOSE BLOOD TEST: CPT

## 2020-11-30 PROCEDURE — 85610 PROTHROMBIN TIME: CPT | Performed by: FAMILY MEDICINE

## 2020-11-30 PROCEDURE — 85730 THROMBOPLASTIN TIME PARTIAL: CPT | Performed by: FAMILY MEDICINE

## 2020-11-30 PROCEDURE — 85025 COMPLETE CBC W/AUTO DIFF WBC: CPT | Performed by: FAMILY MEDICINE

## 2020-11-30 PROCEDURE — 0042T HC CT CEREBRAL PERFUSION W/WO CONTRAST: CPT

## 2020-11-30 PROCEDURE — 70496 CT ANGIOGRAPHY HEAD: CPT

## 2020-11-30 PROCEDURE — 70553 MRI BRAIN STEM W/O & W/DYE: CPT

## 2020-11-30 PROCEDURE — 85651 RBC SED RATE NONAUTOMATED: CPT | Performed by: CLINICAL NURSE SPECIALIST

## 2020-11-30 PROCEDURE — 86140 C-REACTIVE PROTEIN: CPT | Performed by: CLINICAL NURSE SPECIALIST

## 2020-11-30 PROCEDURE — 87635 SARS-COV-2 COVID-19 AMP PRB: CPT | Performed by: FAMILY MEDICINE

## 2020-11-30 PROCEDURE — A9577 INJ MULTIHANCE: HCPCS | Performed by: CLINICAL NURSE SPECIALIST

## 2020-11-30 PROCEDURE — 99283 EMERGENCY DEPT VISIT LOW MDM: CPT

## 2020-11-30 PROCEDURE — 0 GADOBENATE DIMEGLUMINE 529 MG/ML SOLUTION: Performed by: CLINICAL NURSE SPECIALIST

## 2020-11-30 PROCEDURE — 80053 COMPREHEN METABOLIC PANEL: CPT | Performed by: FAMILY MEDICINE

## 2020-11-30 PROCEDURE — 85576 BLOOD PLATELET AGGREGATION: CPT | Performed by: CLINICAL NURSE SPECIALIST

## 2020-11-30 RX ORDER — FERROUS SULFATE 325(65) MG
325 TABLET ORAL
Status: DISCONTINUED | OUTPATIENT
Start: 2020-12-01 | End: 2020-12-01 | Stop reason: HOSPADM

## 2020-11-30 RX ORDER — ATORVASTATIN CALCIUM 40 MG/1
80 TABLET, FILM COATED ORAL NIGHTLY
Status: DISCONTINUED | OUTPATIENT
Start: 2020-11-30 | End: 2020-12-01 | Stop reason: HOSPADM

## 2020-11-30 RX ORDER — DEXTROSE MONOHYDRATE 25 G/50ML
25 INJECTION, SOLUTION INTRAVENOUS
Status: DISCONTINUED | OUTPATIENT
Start: 2020-11-30 | End: 2020-12-01 | Stop reason: HOSPADM

## 2020-11-30 RX ORDER — PANTOPRAZOLE SODIUM 40 MG/1
40 TABLET, DELAYED RELEASE ORAL EVERY MORNING
Status: DISCONTINUED | OUTPATIENT
Start: 2020-12-01 | End: 2020-12-01 | Stop reason: HOSPADM

## 2020-11-30 RX ORDER — CLOPIDOGREL BISULFATE 75 MG/1
75 TABLET ORAL DAILY
Status: DISCONTINUED | OUTPATIENT
Start: 2020-11-30 | End: 2020-12-01

## 2020-11-30 RX ORDER — LEVETIRACETAM 500 MG/1
500 TABLET ORAL EVERY 12 HOURS SCHEDULED
Status: DISCONTINUED | OUTPATIENT
Start: 2020-11-30 | End: 2020-12-01 | Stop reason: HOSPADM

## 2020-11-30 RX ORDER — NICOTINE POLACRILEX 4 MG
15 LOZENGE BUCCAL
Status: DISCONTINUED | OUTPATIENT
Start: 2020-11-30 | End: 2020-12-01 | Stop reason: HOSPADM

## 2020-11-30 RX ORDER — ASPIRIN 81 MG/1
81 TABLET ORAL DAILY
Status: DISCONTINUED | OUTPATIENT
Start: 2020-11-30 | End: 2020-12-01

## 2020-11-30 RX ORDER — SODIUM CHLORIDE 9 MG/ML
125 INJECTION, SOLUTION INTRAVENOUS CONTINUOUS
Status: DISCONTINUED | OUTPATIENT
Start: 2020-11-30 | End: 2020-12-01 | Stop reason: HOSPADM

## 2020-11-30 RX ADMIN — GADOBENATE DIMEGLUMINE 19 ML: 529 INJECTION, SOLUTION INTRAVENOUS at 12:33

## 2020-11-30 RX ADMIN — LEVETIRACETAM 500 MG: 500 TABLET, FILM COATED ORAL at 23:07

## 2020-11-30 RX ADMIN — CLOPIDOGREL BISULFATE 75 MG: 75 TABLET, FILM COATED ORAL at 23:07

## 2020-11-30 RX ADMIN — SODIUM CHLORIDE 500 ML: 9 INJECTION, SOLUTION INTRAVENOUS at 13:56

## 2020-11-30 RX ADMIN — SODIUM CHLORIDE 125 ML/HR: 9 INJECTION, SOLUTION INTRAVENOUS at 23:08

## 2020-11-30 RX ADMIN — IOPAMIDOL 100 ML: 755 INJECTION, SOLUTION INTRAVENOUS at 15:00

## 2020-11-30 RX ADMIN — ASPIRIN 81 MG: 81 TABLET ORAL at 23:07

## 2020-11-30 RX ADMIN — ATORVASTATIN CALCIUM 80 MG: 40 TABLET, FILM COATED ORAL at 23:07

## 2020-11-30 RX ADMIN — IOPAMIDOL 40 ML: 755 INJECTION, SOLUTION INTRAVENOUS at 16:46

## 2020-12-01 ENCOUNTER — READMISSION MANAGEMENT (OUTPATIENT)
Dept: CALL CENTER | Facility: HOSPITAL | Age: 75
End: 2020-12-01

## 2020-12-01 ENCOUNTER — APPOINTMENT (OUTPATIENT)
Dept: NEUROLOGY | Facility: HOSPITAL | Age: 75
End: 2020-12-01

## 2020-12-01 ENCOUNTER — HOSPITAL ENCOUNTER (INPATIENT)
Dept: CARDIOLOGY | Facility: HOSPITAL | Age: 75
Discharge: HOME | End: 2020-12-01

## 2020-12-01 VITALS
RESPIRATION RATE: 18 BRPM | HEIGHT: 64 IN | SYSTOLIC BLOOD PRESSURE: 150 MMHG | WEIGHT: 200 LBS | OXYGEN SATURATION: 98 % | HEART RATE: 97 BPM | DIASTOLIC BLOOD PRESSURE: 62 MMHG | TEMPERATURE: 98 F | BODY MASS INDEX: 34.15 KG/M2

## 2020-12-01 DIAGNOSIS — I63.9 CEREBROVASCULAR ACCIDENT (CVA), UNSPECIFIED MECHANISM (HCC): ICD-10-CM

## 2020-12-01 LAB
GLUCOSE BLDC GLUCOMTR-MCNC: 114 MG/DL (ref 70–130)
GLUCOSE BLDC GLUCOMTR-MCNC: 127 MG/DL (ref 70–130)

## 2020-12-01 PROCEDURE — 95816 EEG AWAKE AND DROWSY: CPT

## 2020-12-01 PROCEDURE — 95816 EEG AWAKE AND DROWSY: CPT | Performed by: PSYCHIATRY & NEUROLOGY

## 2020-12-01 PROCEDURE — 0296T HC EXT ECG > 48HR TO 21 DAY RCRD W/CONECT INTL RCRD: CPT

## 2020-12-01 PROCEDURE — 97165 OT EVAL LOW COMPLEX 30 MIN: CPT | Performed by: OCCUPATIONAL THERAPIST

## 2020-12-01 PROCEDURE — 82962 GLUCOSE BLOOD TEST: CPT

## 2020-12-01 PROCEDURE — 99233 SBSQ HOSP IP/OBS HIGH 50: CPT | Performed by: PSYCHIATRY & NEUROLOGY

## 2020-12-01 RX ORDER — LEVETIRACETAM 500 MG/1
500 TABLET ORAL EVERY 12 HOURS SCHEDULED
Qty: 60 TABLET | Refills: 3 | Status: SHIPPED | OUTPATIENT
Start: 2020-12-01 | End: 2020-12-01 | Stop reason: SDUPTHER

## 2020-12-01 RX ORDER — LEVETIRACETAM 500 MG/1
500 TABLET ORAL EVERY 12 HOURS SCHEDULED
Qty: 60 TABLET | Refills: 3 | Status: SHIPPED | OUTPATIENT
Start: 2020-12-01 | End: 2021-01-18 | Stop reason: SDUPTHER

## 2020-12-01 RX ORDER — FLUTICASONE PROPIONATE 50 MCG
2 SPRAY, SUSPENSION (ML) NASAL DAILY
Status: DISCONTINUED | OUTPATIENT
Start: 2020-12-01 | End: 2020-12-01 | Stop reason: HOSPADM

## 2020-12-01 RX ADMIN — ASPIRIN 81 MG: 81 TABLET ORAL at 09:58

## 2020-12-01 RX ADMIN — FERROUS SULFATE TAB 325 MG (65 MG ELEMENTAL FE) 325 MG: 325 (65 FE) TAB at 09:58

## 2020-12-01 RX ADMIN — SODIUM CHLORIDE 125 ML/HR: 9 INJECTION, SOLUTION INTRAVENOUS at 06:05

## 2020-12-01 RX ADMIN — CLOPIDOGREL BISULFATE 75 MG: 75 TABLET, FILM COATED ORAL at 09:58

## 2020-12-01 RX ADMIN — PANTOPRAZOLE SODIUM 40 MG: 40 TABLET, DELAYED RELEASE ORAL at 06:03

## 2020-12-01 RX ADMIN — LEVETIRACETAM 500 MG: 500 TABLET, FILM COATED ORAL at 09:58

## 2020-12-01 NOTE — DISCHARGE INSTRUCTIONS
NO DRIVING FOR 90 DAYS.    SEIZURE PRECAUTIONS  Seizure precautions were discussed to include no tub baths, no swimming, avoiding lack of sleep, and avoiding known triggers. Education given of things that may contribute to a seizure to include, but not limited to: stressful situations, fever, fatigue, lack of sleep, low blood sugar, hyperventilation, flashing lights, and caffeine. Instructions given to take seizure medications as prescribed. Education given to family member on what to do during a seizure and care following the seizure. Education given to contact this office prior to stopping or changing any medications.

## 2020-12-01 NOTE — PROGRESS NOTES
Neurology Progress Note  Zoom meeting       Chief Complaint:  F/u stroke    Subjective     Subjective:  Patient tested positive for Covid. . She was evaluated by OT and no therapy needs and anticipate d/c home.  She has been NSR on telemetry.    P2Y12 204 and may not be a responder but also question if she is taking as she never filled prescription for Keppra at last hospital discharge 2 weeks ago.     She is complaining of increased fatigue and her nose being stopped up.She does not want to go home    She did have 5 people (counting her grand kids) over for Thanksgiving this year November 26 and they did not wear masks  Medications:  Current Facility-Administered Medications   Medication Dose Route Frequency Provider Last Rate Last Admin   • aspirin EC tablet 81 mg  81 mg Oral Daily Car Salazar MD   81 mg at 12/01/20 0958   • atorvastatin (LIPITOR) tablet 80 mg  80 mg Oral Nightly Car Salazar MD   80 mg at 11/30/20 2307   • clopidogrel (PLAVIX) tablet 75 mg  75 mg Oral Daily Car Salazar MD   75 mg at 12/01/20 0958   • dextrose (D50W) 25 g/ 50mL Intravenous Solution 25 g  25 g Intravenous Q15 Min PRN Car Salazar MD       • dextrose (GLUTOSE) oral gel 15 g  15 g Oral Q15 Min PRN Car Salazar MD       • ferrous sulfate tablet 325 mg  325 mg Oral Daily With Breakfast Car Salazar MD   325 mg at 12/01/20 0958   • glucagon (human recombinant) (GLUCAGEN DIAGNOSTIC) injection 1 mg  1 mg Subcutaneous Q15 Min PRN Car Salazar MD       • insulin lispro (humaLOG) injection 2-7 Units  2-7 Units Subcutaneous TID AC Car Salazar MD       • levETIRAcetam (KEPPRA) tablet 500 mg  500 mg Oral Q12H Car Salazar MD   500 mg at 12/01/20 0958   • pantoprazole (PROTONIX) EC tablet 40 mg  40 mg Oral QAM Car Salazar MD   40 mg at 12/01/20 0603   • sodium chloride 0.9 % infusion  125 mL/hr Intravenous Continuous Car Salazar   mL/hr at 12/01/20 0605 125 mL/hr at 12/01/20 0605       Review of Systems:   -A 14 point review of systems is completed and is negative except as mentioned above.      Objective      Vital Signs  Temp:  [97.5 °F (36.4 °C)-98.1 °F (36.7 °C)] 98 °F (36.7 °C)  Heart Rate:  [78-97] 97  Resp:  [14-18] 18  BP: (150-162)/(54-91) 150/62    Physical Exam:   Neurologic Exam:     Mental Status:    -Awake, Alert, Oriented X 3  -No word finding difficulties  -No aphasia  -No dysarthria  -Follows simple and complex commands     CN II:  Visual fields full to count fingers (nurse did this ) .  Pupils equally reactive to light  CN III, IV, VI:  Extraocular Muscles full with no signs of nystagmus  CN V:  Facial sensory intact to light touch--nurse touched while I watched  CN VII:  Facial motor symmetric  CN VIII:  Gross hearing intact bilaterally  CN XI:  Shoulder shrug symmetric  CN XII:  Tongue is midline on protrusion     Motor: (strength out of 5:  1= minimal movement, 2 = movement in plane of gravity, 3 = movement against gravity, 4 = movement against some resistance, 5 = full strength)     Moves all extremities well against gravity  Nor armor leg drift    Sensory:  -Intact to light touch--nurse touched while I watched     Coordination/gait:  -Finger to nose intact  -Heel to shin intact  -No ataxia     Results Review:    I reviewed the patient's new clinical results.    Results from last 7 days   Lab Units 11/30/20  1748 11/30/20  1356   WBC 10*3/mm3  --  6.56   HEMOGLOBIN g/dL  --  12.8   HEMATOCRIT % 33.8* 37.6   PLATELETS 10*3/mm3 243 266        Results from last 7 days   Lab Units 11/30/20  1356   SODIUM mmol/L 139   POTASSIUM mmol/L 4.0   CHLORIDE mmol/L 102   CO2 mmol/L 28.0   BUN mg/dL 10   CREATININE mg/dL 0.52*   CALCIUM mg/dL 9.4   BILIRUBIN mg/dL 0.5   ALK PHOS U/L 74   ALT (SGPT) U/L 16   AST (SGOT) U/L 18   GLUCOSE mg/dL 124*        Lab Results   Component Value Date    MG 1.8 11/14/2020    PROTIME 12.8  11/30/2020    INR 1.00 11/30/2020     No components found for: POCGLUC  No components found for: A1C  Lab Results   Component Value Date    HDL 33 (L) 11/15/2020    LDL 67 11/15/2020     No components found for: B12  Lab Results   Component Value Date    TSH 1.370 11/15/2020         EEG 12/1/2020  Interpretation:  This is a normal drowsy  and awake  EEG    Assessment/Plan     Hospital Problem List      Cerebrovascular accident (CVA) (CMS/[facility])    Impression:  1. Acute right PCA stroke with suspected thrombus in right PCA but cannot rule out vasospasm or vasculitis. IV TPA not considered as last known well unknown and patient had a stroke 2 weeks ago.   2. Abnormal EEG and MRI brain 11/14/2020 concerning for possible seizure. Repeat EEG is normal while on Keppra  3. Hypertension  4. Former smoker  5. Hyperlipidemia.   6. Covid 19. Patient showed no signs of this when seen in ED yesterday. However states her nose is stopped up and she is getting very fatigued.    Plan:  1. P2Y12 suggest she is not a responder but I question compliance as she never filled prescription for Keppra at hospital discharge 2 weeks ago. Patient reports compliance with ASA and Plavix.  2. Given findings on CTA and P2Y12 will transition to low dose Eliquis 2.5 mg BID and ASA 81 mg at discharge. Would recommend Lovenox therapy per Covid guidelines.  3. Patient has been evaluated by OT and no therapy needs identified at discharge.  4. EEG normal but continue  Keppra 500 mg BID - restarted this admission.  5. Anticipate discharge home today or tomorrow from neurologic standpoint.   6. Discussed patient with Dr. Salazar and patient cleared to discharge home from neurology standpoint. Patient to keep appointment with Dr. Beach 1/18/20201.   7. No driving for 90 days. Seizure precautions at discharge.   8. Will need a follow up MRI in 2 to 3  Months  9. Keep follow up appointment with Dr Beach that was made from last hospitalization 1/18/2020  10. She  wants to fly to [place] --to discuss with Dr Salazar  11. Zio patch for 14 days      Katy Alexis MD  12/01/20  15:50 CST

## 2020-12-01 NOTE — DISCHARGE SUMMARY
[facility] Medicine Services  DISCHARGE SUMMARY       Date of Admission: 11/30/2020  Date of Discharge:  12/1/2020  Primary Care Physician: Car Landeros MD    Presenting Problem/History of Present Illness:  Cerebrovascular accident (CVA), unspecified mechanism (CMS/HCC) [I63.9]     Final Discharge Diagnoses:  Active Hospital Problems    Diagnosis   • Cerebrovascular accident (CVA) (CMS/[facility])      1.  CVA--ESUS  -ASA  -Statin  -Plavix  -Neuro consulted  -PT/OT/SLP     2.  HTN  -Permissive HTN     3.  HLD  -Statin     4.  GERD  -Protonix     5.  T2DM  -SSI     6.  COVID-19   -monitor       Consults: Neurology    Procedures Performed:   N/A    Pertinent Test Results:   MRI Brain:  1.  Acute infarct in the RIGHT occipital lobe PCA distribution.  2.  No change in abnormal T2 FLAIR signal in the RIGHT temporal  Lobe/hippocampus.    CT Head:  1. Abnormal right PCA. The proximal 1.3 cm of the right PCA is normal in  caliber. There is an abrupt transition to a smaller caliber right PCA.  This may be due to a small thrombus. Vasospasm and vasculitis could also  cause and attenuated appearance. This corresponds to the distribution of  the acute infarct seen on MRI.  2. No other major branch occlusion is seen intracranially. The vertebral  and basilar arteries are patent.         Chief Complaint on Day of Discharge:   Follow up stroke    History of Present Illness on Day of Discharge:   Doing well.  Afebrile, no SOA, tolerating room air.  No neuro deficits     Hospital Course:  The patient is a 75 y.o. female who presented to [facility] with abnormal imaging.  She had an outpatient MRI that showed a stroke.    She was placed on Telemetry.  CTA head and neck were performed.  This showed no major stenosis.  Echocardiogram was performed during previous admission and shows no PFO.      Neurology performed an EEG that showed no seizure activity.  They have cleared her for  "discharge.  They recommend a ZIO patch to evalute for atrial fibrillation.  This has been placed.    She was incidentally positive for COVID-19.  She denies cough fever or SOA.  Her oxygen sats are 98% on room air.  She feels comfortable getting around and wants to go home.    I did offer her rehab and SNF options at discharge.  She declines this.      She understands if she has worsening shortness of breath she needs to return to the ER.    She is comfortable with being discharged and with the discharge plan.  She was given the chance to ask questions and all questions were answered to her satisfaction.        Condition on Discharge:  stable    Physical Exam on Discharge:  /62   Pulse 97   Temp 98 °F (36.7 °C) (Oral)   Resp 18   Ht 162.6 cm (64\")   Wt 90.7 kg (200 lb)   SpO2 98%   BMI 34.33 kg/m²   Physical Exam  Constitutional:       Appearance: Normal appearance. She is well-developed.   HENT:      Head: Normocephalic and atraumatic.      Right Ear: Tympanic membrane, ear canal and external ear normal.      Left Ear: Tympanic membrane, ear canal and external ear normal.      Nose: Nose normal.      Mouth/Throat:      Mouth: Mucous membranes are moist.      Pharynx: Oropharynx is clear.   Eyes:      Extraocular Movements: Extraocular movements intact.      Conjunctiva/sclera: Conjunctivae normal.      Pupils: Pupils are equal, round, and reactive to light.   Neck:      Musculoskeletal: Normal range of motion and neck supple.   Cardiovascular:      Rate and Rhythm: Normal rate and regular rhythm.      Heart sounds: Normal heart sounds.   Pulmonary:      Effort: Pulmonary effort is normal.      Breath sounds: Normal breath sounds.   Abdominal:      General: Bowel sounds are normal. There is no distension.      Palpations: Abdomen is soft.      Tenderness: There is no abdominal tenderness.   Musculoskeletal: Normal range of motion.   Skin:     General: Skin is warm and dry.   Neurological:      Mental " Status: She is alert and oriented to person, place, and time.   Psychiatric:         Mood and Affect: Mood normal.         Speech: Speech normal.         Behavior: Behavior normal.         Thought Content: Thought content normal.         Judgment: Judgment normal.           Discharge Disposition:  Home or Self Care    Discharge Medications:     Discharge Medications      New Medications      Instructions Start Date   apixaban 2.5 MG tablet tablet  Commonly known as: ELIQUIS   2.5 mg, Oral, Every 12 Hours Scheduled         Continue These Medications      Instructions Start Date   acetaminophen-codeine 300-30 MG per tablet  Commonly known as: TYLENOL #3   1 tablet, Oral, Every 8 Hours PRN      aspirin 81 MG EC tablet   81 mg, Oral, Daily      atorvastatin 80 MG tablet  Commonly known as: LIPITOR   80 mg, Oral, Nightly      ferrous sulfate 325 (65 FE) MG tablet   325 mg, Oral, Daily With Breakfast      fluticasone 50 MCG/ACT nasal spray  Commonly known as: FLONASE   USE 1 SPRAY IN THE NOSTRILS BID      hydroCHLOROthiazide 12.5 MG tablet  Commonly known as: HYDRODIURIL   12.5 mg, Oral, Daily      levETIRAcetam 500 MG tablet  Commonly known as: KEPPRA   500 mg, Oral, Every 12 Hours Scheduled      losartan 100 MG tablet  Commonly known as: COZAAR   100 mg, Oral, Daily      omeprazole 20 MG capsule  Commonly known as: priLOSEC   20 mg, Oral, 2 Times Daily      vitamin B-12 100 MCG tablet  Commonly known as: CYANOCOBALAMIN   50 mcg, Oral, Daily         Stop These Medications    clopidogrel 75 MG tablet  Commonly known as: PLAVIX            Discharge Diet: Regular    Activity at Discharge: as tolerated    Discharge Care Plan/Instructions: Go to ER for shortness of breath    Follow-up Appointments:   Future Appointments   Date Time Provider Department Center   1/18/2021 12:30 PM José Antonio Beach MD [facility]       Test Results Pending at Discharge: Zio Patch    Electronically signed by Car Salazar MD, 12/01/20,  17:47 CST.    Time: 35 minutes

## 2020-12-01 NOTE — H&P
[facility] Medicine Services  HISTORY AND PHYSICAL    Date of Admission: 11/30/2020  Primary Care Physician: Car Landeros MD    Subjective     Chief Complaint: abnormal imaging    History of Present Illness  Mrs. Jimenez is a 75 year old lady with a history of seizures and diabetes, both of which are well controlled.  She also has a history of GERD which is well-controlled.    She had an outpatient MRI that showed a stroke.  She denies any focal deficits or speech difficulties.      She incidentally tested positive for COVID-19 on admission screening.  She denies cough, SOA or fever.          Review of Systems   Constitutional: Negative for fatigue and fever.   HENT: Negative for congestion and ear pain.    Eyes: Negative for pain and visual disturbance.   Respiratory: Negative for cough, shortness of breath and wheezing.    Cardiovascular: Negative for chest pain and palpitations.   Gastrointestinal: Negative for diarrhea, nausea and vomiting.   Endocrine: Negative for heat intolerance.   Genitourinary: Negative for dysuria and frequency.   Musculoskeletal: Negative for arthralgias and back pain.   Skin: Negative for rash and wound.   Neurological: Negative for dizziness and light-headedness.   Psychiatric/Behavioral: Negative for confusion. The patient is not nervous/anxious.    All other systems reviewed and are negative.       Otherwise complete ROS reviewed and negative except as mentioned in the HPI.    Past Medical History:   Past Medical History:   Diagnosis Date   • Colon polyp    • Diabetes mellitus (CMS/HCC)    • Diverticulitis    • Duodenal ulcer    • GERD (gastroesophageal reflux disease)    • Hemorrhoids    • Histoplasmosis    • Hyperlipidemia    • Hypertension      Past Surgical History:  Past Surgical History:   Procedure Laterality Date   • APPENDECTOMY     • CHOLECYSTECTOMY     • COLONOSCOPY  03/25/2014   • ENDOSCOPY  07/14/2015   • HYSTERECTOMY     •  REPLACEMENT TOTAL KNEE     • SPINE SURGERY     • TOTAL HIP ARTHROPLASTY Bilateral     x 2   • TUBAL ABDOMINAL LIGATION       Social History:  reports that she has quit smoking. She has never used smokeless tobacco. She reports that she does not drink alcohol or use drugs.    Family History: family history includes Cancer in her father; Heart disease in her father and mother.       Allergies:  Allergies   Allergen Reactions   • Demerol [Meperidine] Nausea And Vomiting   • Morphine And Related Nausea And Vomiting   • Stadol [Butorphanol] Other (See Comments)     Pt does not recall   • Talwin [Pentazocine] Mental Status Change   • Adhesive Tape Rash   • Prednisone Other (See Comments)     Pt does not recall     Medications:  Prior to Admission medications    Medication Sig Start Date End Date Taking? Authorizing Provider   acetaminophen-codeine (TYLENOL #3) 300-30 MG per tablet Take 1 tablet by mouth Every 8 (Eight) Hours As Needed for Moderate Pain . 11/15/19   Car Mejia APRN   aspirin 81 MG EC tablet Take 81 mg by mouth Daily.    Sangeeta Ulloa MD   atorvastatin (LIPITOR) 80 MG tablet Take 1 tablet by mouth Every Night for 30 days. 11/16/20 12/16/20  Awilda Dyer APRN   clopidogrel (PLAVIX) 75 MG tablet Take 1 tablet by mouth Daily for 30 days. 11/16/20 12/16/20  Awilda Dyer APRN   ferrous sulfate 325 (65 FE) MG tablet Take 325 mg by mouth Daily With Breakfast.    Sangeeta Ulloa MD   fluticasone (FLONASE) 50 MCG/ACT nasal spray USE 1 SPRAY IN THE NOSTRILS BID 8/29/19   Sangeeta Ulloa MD   hydroCHLOROthiazide (HYDRODIURIL) 12.5 MG tablet Take 12.5 mg by mouth Daily.    Sangeeta Ulloa MD   levETIRAcetam (KEPPRA) 500 MG tablet Take 1 tablet by mouth Every 12 (Twelve) Hours for 30 days. 11/16/20 12/16/20  Awilda Dyer APRN   losartan (COZAAR) 100 MG tablet Take 100 mg by mouth Daily.    Sangeeta Ulloa MD   omeprazole (priLOSEC) 20 MG capsule Take 20 mg by mouth 2 (Two) Times  "a Day.    ProviderSangeeta MD   vitamin B-12 (CYANOCOBALAMIN) 100 MCG tablet Take 50 mcg by mouth Daily.    Provider, MD Sangeeta     Objective     Vital Signs: /70 (BP Location: Right arm, Patient Position: Sitting)   Pulse 102   Temp 97.8 °F (36.6 °C) (Oral)   Resp 16   Ht 162.6 cm (64\")   Wt 90.7 kg (200 lb)   SpO2 98%   BMI 34.33 kg/m²   Physical Exam  Constitutional:       Appearance: Normal appearance. She is well-developed.   HENT:      Head: Normocephalic and atraumatic.      Right Ear: Tympanic membrane, ear canal and external ear normal.      Left Ear: Tympanic membrane, ear canal and external ear normal.      Nose: Nose normal.      Mouth/Throat:      Mouth: Mucous membranes are moist.      Pharynx: Oropharynx is clear.   Eyes:      Extraocular Movements: Extraocular movements intact.      Conjunctiva/sclera: Conjunctivae normal.      Pupils: Pupils are equal, round, and reactive to light.   Neck:      Musculoskeletal: Normal range of motion and neck supple.   Cardiovascular:      Rate and Rhythm: Normal rate and regular rhythm.      Heart sounds: Normal heart sounds.   Pulmonary:      Effort: Pulmonary effort is normal.      Breath sounds: Normal breath sounds.   Abdominal:      General: Bowel sounds are normal. There is no distension.      Palpations: Abdomen is soft.      Tenderness: There is no abdominal tenderness.   Musculoskeletal: Normal range of motion.   Skin:     General: Skin is warm and dry.   Neurological:      Mental Status: She is alert and oriented to person, place, and time.   Psychiatric:         Mood and Affect: Mood normal.         Speech: Speech normal.         Behavior: Behavior normal.         Thought Content: Thought content normal.         Judgment: Judgment normal.              Results Reviewed:  Lab Results (last 24 hours)     Procedure Component Value Units Date/Time    C-reactive Protein [[id number]]  (Abnormal) Collected: 11/30/20 1356    Specimen: Blood " Updated: 11/30/20 1817     C-Reactive Protein 2.08 mg/dL     COVID PRE-OP / PRE-PROCEDURE SCREENING ORDER (NO ISOLATION) - Swab, Nasal Cavity [[id number]]  (Abnormal) Collected: 11/30/20 1621    Specimen: Swab from Nasal Cavity Updated: 11/30/20 1803    Narrative:      The following orders were created for panel order COVID PRE-OP / PRE-PROCEDURE SCREENING ORDER (NO ISOLATION) - Swab, Nasal Cavity.  Procedure                               Abnormality         Status                     ---------                               -----------         ------                     COVID-19,[facility]...[[id number]]  Abnormal            Final result                 Please view results for these tests on the individual orders.    COVID-19,[facility] IN-HOUSE,Nasal Swab No Transport Media 3-4 HR TAT - Swab, Nasal Cavity [[id number]]  (Abnormal) Collected: 11/30/20 1621    Specimen: Swab from Nasal Cavity Updated: 11/30/20 1803     COVID19 Detected    Narrative:      Fact sheet for providers: https://www.fda.gov/media/542009/download     Fact sheet for patients: https://www.fda.gov/media/117651/download    P2Y12 Platelet Inhibition [[id number]]  (Abnormal) Collected: 11/30/20 1748    Specimen: Blood Updated: 11/30/20 1801     Hematocrit 33.8 %      Platelets 243 10*3/mm3      P2Y12 Reactivity Unit 204 PRU     Narrative:      PRU reference range 194-418 is for patients not receiving P2Y12 drug. Post Drug results: Lower PRU levels are associated with expected antiplatelet effect. Values may be below the stated reference range above. The post-drug PRU values reported are .          Sedimentation Rate [[id number]]  (Normal) Collected: 11/30/20 1356    Specimen: Blood Updated: 11/30/20 1741     Sed Rate 8 mm/hr     Protime-INR [[id number]]  (Normal) Collected: 11/30/20 1426    Specimen: Blood Updated: 11/30/20 1447     Protime 12.8 Seconds      INR 1.00    aPTT [[id number]]  (Normal) Collected: 11/30/20 1426    Specimen: Blood  Updated: 11/30/20 1447     PTT 29.6 seconds     Comprehensive Metabolic Panel [[id number]]  (Abnormal) Collected: 11/30/20 1356    Specimen: Blood Updated: 11/30/20 1430     Glucose 124 mg/dL      BUN 10 mg/dL      Creatinine 0.52 mg/dL      Sodium 139 mmol/L      Potassium 4.0 mmol/L      Chloride 102 mmol/L      CO2 28.0 mmol/L      Calcium 9.4 mg/dL      Total Protein 7.3 g/dL      Albumin 4.10 g/dL      ALT (SGPT) 16 U/L      AST (SGOT) 18 U/L      Alkaline Phosphatase 74 U/L      Total Bilirubin 0.5 mg/dL      eGFR Non African Amer 115 mL/min/1.73      Globulin 3.2 gm/dL      A/G Ratio 1.3 g/dL      BUN/Creatinine Ratio 19.2     Anion Gap 9.0 mmol/L     Narrative:      GFR Normal >60  Chronic Kidney Disease <60  Kidney Failure <15      CBC & Differential [[id number]]  (Abnormal) Collected: 11/30/20 1356    Specimen: Blood Updated: 11/30/20 1408    Narrative:      The following orders were created for panel order CBC & Differential.  Procedure                               Abnormality         Status                     ---------                               -----------         ------                     CBC Auto Differential[[id number]]        Abnormal            Final result                 Please view results for these tests on the individual orders.    CBC Auto Differential [[id number]]  (Abnormal) Collected: 11/30/20 1356    Specimen: Blood Updated: 11/30/20 1408     WBC 6.56 10*3/mm3      RBC 4.25 10*6/mm3      Hemoglobin 12.8 g/dL      Hematocrit 37.6 %      MCV 88.5 fL      MCH 30.1 pg      MCHC 34.0 g/dL      RDW 12.7 %      RDW-SD 41.1 fl      MPV 9.8 fL      Platelets 266 10*3/mm3      Neutrophil % 76.3 %      Lymphocyte % 13.4 %      Monocyte % 9.5 %      Eosinophil % 0.3 %      Basophil % 0.2 %      Immature Grans % 0.3 %      Neutrophils, Absolute 5.01 10*3/mm3      Lymphocytes, Absolute 0.88 10*3/mm3      Monocytes, Absolute 0.62 10*3/mm3      Eosinophils, Absolute 0.02 10*3/mm3      Basophils,  Absolute 0.01 10*3/mm3      Immature Grans, Absolute 0.02 10*3/mm3      nRBC 0.0 /100 WBC         Imaging Results (Last 24 Hours)     Procedure Component Value Units Date/Time    CT Cerebral Perfusion With & Without Contrast [[id number]] Collected: 11/30/20 1704     Updated: 11/30/20 1712    Narrative:      INDICATION: Right PCA stroke.     EXAM:      1. Perfusion CT is performed to acquire images tracking the temporal  course of iodinated contrast material passing through the cerebral  circulation. Perfusion parameters, such as cerebral blood flow (CBF),  cerebral blood volume (CBV), mean transit time (MTT), etc., are  calculated by [employer] with additional provided perfusion maps and  estimated stroke volumes.  2. Automated exposure control (AEC) protocols are utilized on the  scanner to ensure dose lowered technique.      Comparison: Noncontrast CT brain and brain angiogram dated 11/30/2020  4:36 PM CST     DISTRIBUTION: Perfusion abnormality involves the Right PCA vascular  distribution.     Tmax >6.0 seconds volume: 6 ml     CBF < 30% volume: 0 ml     MISMATCH VOLUME: 6 ml      MISMATCH RATIO: Infinite       Impression:      Right PCA distribution stroke, as described.  This report was finalized on 11/30/2020 17:09 by Dr. Vladimir Granado MD.    CT Angiogram Neck [[id number]] Collected: 11/30/20 1521     Updated: 11/30/20 1533    Narrative:      Exam: CT angiography with 3D MIP images neck with IV contrast     Indication: New infarct in occipital lobe seen on MRI     Comparison: None     Dose length product: 173 mGy cm. Automated exposure control was also  utilized to decrease patient radiation dose.     Findings:     Three-vessel aortic arch with widely patent branch origins. The RIGHT  common carotid artery and extracranial internal carotid artery are  widely patent. The LEFT common carotid artery and extracranial internal  carotid artery are widely patent. Both vertebral arteries are widely  patent.       Parapharyngeal fat planes are maintained. No focal asymmetry of the  aerodigestive tract. Parotid and submandibular glands appear  unremarkable. Thyroid is uniform. Peripheral groundglass opacity in the  LEFT upper lobe is noted. No cervical lymphadenopathy.      Mastoid air cells are clear. Mild maxillary sinus mucosal thickening.  Mild cervical spine degenerative change. Cervical spine vertebral body  heights and alignment are maintained. No acute osseous finding.       Impression:      Impression:     1.  No major vascular occlusion, dissection, or flow-limiting stenosis  in the neck.  2.  Peripheral focus of groundglass opacity in the LEFT upper lobe.  Differential includes infectious process, including Covid 19.  This report was finalized on 11/30/2020 15:30 by Dr. Salbador Jara MD.    CT Angiogram Head [[id number]] Collected: 11/30/20 1513     Updated: 11/30/20 1525    Narrative:      EXAMINATION:  CT ANGIOGRAM HEAD-  11/30/2020 2:53 PM CST     HISTORY: Acute right PCA distribution infarct on MRI performed today.     COMPARISON : No comparison study.     DLP: 173 mGy-cm. Automated dosage control was utilized.     TECHNIQUE: CT angio was performed of the brain with contrast. Coronal,  sagittal and 3-D images were reconstructed.     FINDINGS: The vertebral and basilar arteries are patent. There are  patent PICA vessels bilaterally. The superior cerebellar arteries are  patent. The left posterior cerebral artery is patent. There is an  attenuated appearance of the right posterior cerebral artery. The  proximal 1.3 cm of the right PCA appears fairly normal caliber but there  is attenuated appearance of the right PCA distal to this. The internal  carotid arteries are patent. The anterior and middle cerebral arteries  are patent. The visualized venous sinuses are patent.       Impression:      1. Abnormal right PCA. The proximal 1.3 cm of the right PCA is normal in  caliber. There is an abrupt transition to a  smaller caliber right PCA.  This may be due to a small thrombus. Vasospasm and vasculitis could also  cause and attenuated appearance. This corresponds to the distribution of  the acute infarct seen on MRI.  2. No other major branch occlusion is seen intracranially. The vertebral  and basilar arteries are patent.     The full report of this exam was immediately signed and available to the  emergency room. The patient is currently in the emergency room.  This report was finalized on 11/30/2020 15:22 by Dr. Vladimir Granado MD.        I have personally reviewed and interpreted the radiology studies and ECG obtained at time of admission.     Assessment / Plan     Assessment:   Active Hospital Problems    Diagnosis   • Cerebrovascular accident (CVA) (CMS/[facility])     Labs reviewed:  Normal renal function    Imaging reviewed:  MRI, CTA head and neck    Telemetry reviewed    Echocardiogram from 11/15 reviewed        1.  CVA  -ASA  -Statin  -Plavix  -Neuro consulted  -PT/OT/SLP    2.  HTN  -Permissive HTN    3.  HLD  -Statin    4.  GERD  -Protonix    5.  T2DM  -SSI    6.  COVID-19   -monitor    Code Status: full     I discussed the patient's findings and my recommendations with the patient    Estimated length of stay 11/30/2020    Patient seen and examined by me on 11/30/2020 at 1715    Electronically signed by Car Salazar MD, 11/30/20, 19:53 CST.

## 2020-12-01 NOTE — PLAN OF CARE
Problem: Adult Inpatient Plan of Care  Goal: Plan of Care Review  Recent Flowsheet Documentation  Taken 12/1/2020 1035 by Katheryn Mcclendon OTR/L  Plan of Care Reviewed With: patient  Outcome Summary: OT eval completed. Pt is A&Ox4. She displays no deficits which require skilled OT services at this time. BUE and BLE strength 5/5. FMC/GMC B intact. She was able to independently complete bed mobility, functional t/fs, mobility, LB dressing, tolieting and grooming. OT to sign off at this time. Anticipate d/c home with assist.

## 2020-12-01 NOTE — PLAN OF CARE
Goal Outcome Evaluation:  Plan of Care Reviewed With: patient  Progress: no change  Outcome Summary: VSS. O2 stable on RA, sats in the upper 90s. No c/o pain. Up ad to BSC. IV fluids infusing. NIH= 0. Safety maintained. Will continue to monitor.

## 2020-12-01 NOTE — PROGRESS NOTES
Discharge Planning Assessment  [facility]     Patient Name: Pascale Jimenez  MRN: [id number]  Today's Date: 12/1/2020    Admit Date: 11/30/2020    Discharge Needs Assessment     Row Name 12/01/20 1013       Living Environment    Lives With  alone  (Pended)     Current Living Arrangements  home/apartment/condo  (Pended)     Primary Care Provided by  self  (Pended)     Provides Primary Care For  no one  (Pended)     Family Caregiver if Needed  child(jeanie), adult  (Pended)     Family Caregiver Names  Rebecca  (Pended)     Quality of Family Relationships  helpful;involved;supportive  (Pended)        Transition Planning    Patient/Family Anticipates Transition to  home  (Pended)        Discharge Needs Assessment    Readmission Within the Last 30 Days  no previous admission in last 30 days  (Pended)     Equipment Currently Used at Home  none  (Pended)     Current Discharge Risk  lives alone  (Pended)         Discharge Plan     Row Name 12/01/20 1017       Plan    Plan  Home  (Pended)     Plan Comments  Called and spoke with pt's daughter Rebecca [phone]. Daughter states that pt lives alone and  that she and her brothers will be able to help care for her when she is discharged. Daughter states that pt has been mostly independent up until this time. Pt has PCP/RX coverage. Will follow.  (Pended)         Continued Care and Services - Admitted Since 11/30/2020    Coordination has not been started for this encounter.         Demographic Summary    No documentation.       Functional Status    No documentation.       Psychosocial    No documentation.       Abuse/Neglect    No documentation.       Legal    No documentation.       Substance Abuse    No documentation.       Patient Forms    No documentation.           Nestor Mesa

## 2020-12-01 NOTE — THERAPY DISCHARGE NOTE
Acute Care - Occupational Therapy Discharge  [facility]    Patient Name: Pascale Jimenez  : 1945    MRN: [id number]                              Today's Date: 2020       Admit Date: 2020    Visit Dx:     ICD-10-CM ICD-9-CM   1. Cerebrovascular accident (CVA), unspecified mechanism (CMS/HCC)  I63.9 434.91   2. Decreased activities of daily living (ADL)  Z78.9 V49.89     Patient Active Problem List   Diagnosis   • Compression fracture of body of thoracic vertebra (CMS/HCC)   • Obesity (BMI 30-39.9)   • Former smoker   • Acute bilateral low back pain without sciatica   • CAD (coronary artery disease)   • Nocturnal hypoxemia   • Type 2 diabetes mellitus (CMS/HCC)   • Transient ischemic attack (TIA)   • Seizures (CMS/HCC)   • Cerebrovascular accident (CVA) (CMS/HCC)     Past Medical History:   Diagnosis Date   • Colon polyp    • Diabetes mellitus (CMS/HCC)    • Diverticulitis    • Duodenal ulcer    • GERD (gastroesophageal reflux disease)    • Hemorrhoids    • Histoplasmosis    • Hyperlipidemia    • Hypertension      Past Surgical History:   Procedure Laterality Date   • APPENDECTOMY     • CHOLECYSTECTOMY     • COLONOSCOPY  2014   • ENDOSCOPY  2015   • HYSTERECTOMY     • REPLACEMENT TOTAL KNEE     • SPINE SURGERY     • TOTAL HIP ARTHROPLASTY Bilateral     x 2   • TUBAL ABDOMINAL LIGATION       General Information     Row Name 20 1031          OT Time and Intention    Document Type  evaluation Pt present to ED with abd MRI. Pt recently admitted on  with stroke-like symptoms. Pt now reporting slight visual changes. MRI Brain: acute R PCA stroke. Dx: CVA.  -JFEI     Mode of Treatment  occupational therapy  -LISBET     Row Name 20 8616          General Information    Patient Profile Reviewed  yes  -LISBET     Prior Level of Function  independent:;all household mobility;community mobility;transfer;ADL's;bed mobility  -JFEI     Row Name 20 1036          Living Environment    Lives With   alone  -     Row Name 12/01/20 1035          Home Main Entrance    Number of Stairs, Main Entrance  eight  -     Stair Railings, Main Entrance  railings on both sides of stairs  -     Row Name 12/01/20 1035          Stairs Within Home, Primary    Number of Stairs, Within Home, Primary  other (see comments) 12 to basement for laundry  -     Stair Railings, Within Home, Primary  railing on right side (ascending)  -     Row Name 12/01/20 1035          Cognition    Orientation Status (Cognition)  oriented x 4  -       User Key  (r) = Recorded By, (t) = Taken By, (c) = Cosigned By    Initials Name Provider Type     Katheryn Mcclendon OTR/L Occupational Therapist        Mobility/ADL's     Row Name 12/01/20 1035          Bed Mobility    Bed Mobility  supine-sit;sit-supine  -     Supine-Sit [place] (Bed Mobility)  -- sitting EOB upon entry to room  -     Sit-Supine [place] (Bed Mobility)  independent  -     Assistive Device (Bed Mobility)  head of bed elevated  -     Row Name 12/01/20 1035          Transfers    Transfers  sit-stand transfer  -     Sit-Stand [place] (Transfers)  independent  -     Row Name 12/01/20 1035          Functional Mobility    Functional Mobility- Ind. Level  independent  -     Row Name 12/01/20 1035          Activities of Daily Living    BADL Assessment/Intervention  toileting;lower body dressing;grooming  -     Row Name 12/01/20 1035          Toileting Assessment/Training    [place] Level (Toileting)  independent  -     Assistive Devices (Toileting)  commode, bedside without drop arms  -     Comment (Toileting)  to reports just finishing all aspects of toileting with bsc just prior to therapist entry to room  -     Row Name 12/01/20 1035          Lower Body Dressing Assessment/Training    [place] Level (Lower Body Dressing)  don;doff;socks;independent  -     Position (Lower Body Dressing)  edge of bed sitting  -     Row Name 12/01/20  1035          Grooming Assessment/Training    [place] Level (Grooming)  wash face, hands;independent  -     Position (Grooming)  edge of bed sitting  -       User Key  (r) = Recorded By, (t) = Taken By, (c) = Cosigned By    Initials Name Provider Type    Katheryn Singleton OTR/L Occupational Therapist        Obj/Interventions     Row Name 12/01/20 1035          Sensory Assessment (Somatosensory)    Sensory Assessment (Somatosensory)  sensation intact;UE sensation intact;LE sensation intact  -     Row Name 12/01/20 1035          Vision Assessment/Intervention    Visual Impairment/Limitations  other (see comments) Pt reports slightly burry vision, but peripheral vision intact. Slight burry vision does not impact her ability to safely amb, t/f or complete adls.  -     Row Name 12/01/20 1035          Range of Motion Comprehensive    General Range of Motion  bilateral upper extremity ROM WNL;bilateral lower extremity ROM WNL  -     Row Name 12/01/20 1035          Strength Comprehensive (MMT)    Comment, General Manual Muscle Testing (MMT) Assessment  BUE and BLE strength 5/5  -     Row Name 12/01/20 1035          Balance    Balance Assessment  sitting static balance;standing static balance;sitting dynamic balance;standing dynamic balance  -     Static Sitting Balance  WNL  -     Dynamic Sitting Balance  WNL  -J     Static Standing Balance  WFL  -     Dynamic Standing Balance  WFL  -       User Key  (r) = Recorded By, (t) = Taken By, (c) = Cosigned By    Initials Name Provider Type    Katheryn Singleton OTR/L Occupational Therapist        Goals/Plan    No documentation.       Clinical Impression     Row Name 12/01/20 1035          Pain Assessment    Additional Documentation  Pain Scale: Numbers Pre/Post-Treatment (Group)  -[facility] Name 12/01/20 1035          Pain Scale: Numbers Pre/Post-Treatment    Pretreatment Pain Rating  0/10 - no pain  -     Posttreatment Pain Rating  0/10 - no  pain  -J     Pain Intervention(s)  Medication (See MAR);Ambulation/increased activity;Repositioned  -J     Row Name 12/01/20 1035          Plan of Care Review    Plan of Care Reviewed With  patient  -JJ     Outcome Summary  OT eval completed. Pt is A&Ox4. She displays no deficits which require skilled OT services at this time. BUE and BLE strength 5/5. FMC/GMC B intact. She was able to independently complete bed mobility, functional t/fs, mobility, LB dressing, tolieting and grooming. OT to sign off at this time. Anticipate d/c home with assist.  -J     Row Name 12/01/20 1035          Therapy Assessment/Plan (OT)    Patient/Family Therapy Goal Statement (OT)  return home  -     Criteria for Skilled Therapeutic Interventions Met (OT)  no problems identified which require skilled intervention  -     Therapy Frequency (OT)  evaluation only  -     Row Name 12/01/20 1035          Therapy Plan Review/Discharge Plan (OT)    Anticipated Discharge Disposition (OT)  home with assist  -     Row Name 12/01/20 1035          Vital Signs    Pre Patient Position  Sitting  -JJ     Intra Patient Position  Standing  -JJ     Post Patient Position  Supine  -JJ     Row Name 12/01/20 1035          Positioning and Restraints    Pre-Treatment Position  in bed  -JJ     Post Treatment Position  bed  -JJ     In Bed  notified nsg;fowlers;call light within reach;encouraged to call for assist;exit alarm on  -J       User Key  (r) = Recorded By, (t) = Taken By, (c) = Cosigned By    Initials Name Provider Type    Katheryn Singleton OTR/L Occupational Therapist        Outcome Measures     Row Name 12/01/20 1035          Modified Cher Scale    Modified Cher Scale  0 - No Symptoms at all.  -J     Row Name 12/01/20 1035          Functional Assessment    Outcome Measure Options  Modified [place]  -J       User Key  (r) = Recorded By, (t) = Taken By, (c) = Cosigned By    Initials Name Provider Type    Katheryn Singleton OTR/ROMARIO  Occupational Therapist        Occupational Therapy Education                 Title: PT OT SLP Therapies (Not Started)     Topic: Occupational Therapy (In Progress)     Point: ADL training (Done)     Description:   Instruct learner(s) on proper safety adaptation and remediation techniques during self care or transfers.   Instruct in proper use of assistive devices.              Learning Progress Summary           Patient Acceptance, E, VU by LISBET at 12/1/2020 1141                   Point: Home exercise program (Not Started)     Description:   Instruct learner(s) on appropriate technique for monitoring, assisting and/or progressing therapeutic exercises/activities.              Learner Progress:  Not documented in this visit.          Point: Precautions (Not Started)     Description:   Instruct learner(s) on prescribed precautions during self-care and functional transfers.              Learner Progress:  Not documented in this visit.          Point: Body mechanics (Not Started)     Description:   Instruct learner(s) on proper positioning and spine alignment during self-care, functional mobility activities and/or exercises.              Learner Progress:  Not documented in this visit.                      User Key     Initials Effective Dates Name Provider Type Discipline    LISBET 07/05/20 -  Katheryn Mcclendon OTR/L Occupational Therapist OT              OT Recommendation and Plan  Retired Outcome Summary/Treatment Plan (OT)  Anticipated Discharge Disposition (OT): home with assist  Therapy Frequency (OT): evaluation only  Plan of Care Review  Plan of Care Reviewed With: patient  Outcome Summary: OT eval completed. Pt is A&Ox4. She displays no deficits which require skilled OT services at this time. BUE and BLE strength 5/5. FMC/GMC B intact. She was able to independently complete bed mobility, functional t/fs, mobility, LB dressing, tolieting and grooming. OT to sign off at this time. Anticipate d/c home with  assist.  Plan of Care Reviewed With: patient  Outcome Summary: OT eval completed. Pt is A&Ox4. She displays no deficits which require skilled OT services at this time. BUE and BLE strength 5/5. FMC/GMC B intact. She was able to independently complete bed mobility, functional t/fs, mobility, LB dressing, tolieting and grooming. OT to sign off at this time. Anticipate d/c home with assist.     Time Calculation:   Time Calculation- OT     Row Name 12/01/20 1142             Time Calculation- OT    OT Start Time  1035  -      OT Stop Time  1130  -      OT Time Calculation (min)  55 min  -      OT Received On  12/01/20  -        User Key  (r) = Recorded By, (t) = Taken By, (c) = Cosigned By    Initials Name Provider Type    Katheryn Singleton OTR/L Occupational Therapist        Therapy Charges for Today     Code Description Service Date Service Provider Modifiers Qty    [id number] HC OT EVAL LOW COMPLEXITY 4 12/1/2020 Katheryn Mcclendon OTR/L GO 1               MARYCRUZ Hunter/ROMARIO  12/1/2020

## 2020-12-02 NOTE — OUTREACH NOTE
Prep Survey      Responses   [facility] discharged from?  [facility]   Is LACE score < 7 ?  No   Eligibility  Readm Mgmt   Discharge diagnosis  Cerebrovascular accident,  incidentally positive for COVID-19 (asymptomatic)   Does the patient have one of the following disease processes/diagnoses(primary or secondary)?  Stroke (TIA)   Does the patient have Home health ordered?  No   Is there a DME ordered?  No   Prep survey completed?  Yes          Noreen Thomas RN

## 2020-12-07 ENCOUNTER — READMISSION MANAGEMENT (OUTPATIENT)
Dept: CALL CENTER | Facility: HOSPITAL | Age: 75
End: 2020-12-07

## 2020-12-07 NOTE — OUTREACH NOTE
Stroke Week 1 Survey      Responses   [facility] patient discharged from?  [place]   Does the patient have one of the following disease processes/diagnoses(primary or secondary)?  Stroke (TIA)   Week 1 attempt successful?  Yes   Call start time  1131   Call end time  1139   Discharge diagnosis  Cerebrovascular accident,  incidentally positive for COVID-19 (asymptomatic)   Meds reviewed with patient/caregiver?  Yes   Is the patient having any side effects they believe may be caused by any medication additions or changes?  No   Does the patient have all medications ordered at discharge?  Yes   Is the patient taking all medications as directed (includes completed medication regime)?  Yes   Does the patient have a primary care provider?   Yes   Does the patient have an appointment with their PCP within 7 days of discharge?  Yes   Has the patient kept scheduled appointments due by today?  N/A   Has home health visited the patient within 72 hours of discharge?  N/A   Does the patient require any assistance with activities of daily living such as eating, bathing, dressing, walking, etc.?  Yes   Does the patient have any residual symptoms from stroke/TIA?  Yes   Does the patient understand the diet ordered at discharge?  Yes   Did the patient receive a copy of their discharge instructions?  Yes   Nursing interventions  Reviewed instructions with patient   What is the patient's perception of their health status since discharge?  Improving   Nursing interventions  Nurse provided patient education   Is the patient able to teach back FAST for Stroke?  Yes   Is the patient/caregiver able to teach back the risk factors for a stroke?  High blood pressure-goal below 120/80, Sleep apnea, Diabetes, High Cholesterol, Physical inactivity and obesity   Is the patient/caregiver able to teach back signs and symptoms related to disease process for when to call PCP?  Yes   Is the patient/caregiver able to teach back signs and symptoms  related to disease process for when to call 911?  Yes   If the patient is a current smoker, are they able to teach back resources for cessation?  Not a smoker   Is the patient/caregiver able to teach back the hierarchy of who to call/visit for symptoms/problems? PCP, Specialist, Home health nurse, Urgent Care, ED, 911  Yes   Week 1 call completed?  Yes          Wolfgang Reyes RN

## 2020-12-14 ENCOUNTER — READMISSION MANAGEMENT (OUTPATIENT)
Dept: CALL CENTER | Facility: HOSPITAL | Age: 75
End: 2020-12-14

## 2020-12-14 NOTE — OUTREACH NOTE
Stroke Week 2 Survey      Responses   [facility] patient discharged from?  [place]   Does the patient have one of the following disease processes/diagnoses(primary or secondary)?  Stroke (TIA)   Week 2 attempt successful?  Yes   Call start time  1338   Call end time  1356   Discharge diagnosis  Cerebrovascular accident,  incidentally positive for COVID-19 (asymptomatic)   Meds reviewed with patient/caregiver?  Yes   Is the patient having any side effects they believe may be caused by any medication additions or changes?  No   Does the patient have all medications ordered at discharge?  Yes   Is the patient taking all medications as directed (includes completed medication regime)?  Yes   Does the patient have a primary care provider?   Yes   Does the patient have an appointment with their PCP within 7 days of discharge?  Yes   Has the patient kept scheduled appointments due by today?  N/A   Has home health visited the patient within 72 hours of discharge?  N/A   Does the patient require any assistance with activities of daily living such as eating, bathing, dressing, walking, etc.?  No   Does the patient have any residual symptoms from stroke/TIA?  No   Does the patient understand the diet ordered at discharge?  Yes   Did the patient receive a copy of their discharge instructions?  Yes   Nursing interventions  Reviewed instructions with patient   What is the patient's perception of their health status since discharge?  Improving   Nursing interventions  Nurse provided patient education   Is the patient able to teach back FAST for Stroke?  Yes   Is the patient/caregiver able to teach back the risk factors for a stroke?  High blood pressure-goal below 120/80, Sleep apnea, Physical inactivity and obesity, High Cholesterol   Is the patient/caregiver able to teach back signs and symptoms related to disease process for when to call PCP?  Yes   Is the patient/caregiver able to teach back signs and symptoms related to  disease process for when to call 911?  Yes   If the patient is a current smoker, are they able to teach back resources for cessation?  Not a smoker   Is the patient/caregiver able to teach back the hierarchy of who to call/visit for symptoms/problems? PCP, Specialist, Home health nurse, Urgent Care, ED, 911  Yes   Additional teach back comments  Patient has states she is wearing a monitor and it is blank.  Asking if this is ok since she is turning in tomorrow.  Advised to call number that came with device to check on this.  She states she was Covid19 positive with no symptoms.  She has cleaned highly used surfaces and changed out toothbrush.  She had questions regarding if she could get Covid19 again.  Explained at this time it is unkown and to still take precautions.     Week 2 call completed?  Yes   Wrap up additional comments  queestions answered and pt to contact device manf. regarding her device.          Perlita yIer LPN

## 2020-12-24 PROCEDURE — 0298T HOLTER MONITOR - 72 HOUR UP TO 21 DAY: CPT | Performed by: INTERNAL MEDICINE

## 2021-01-18 ENCOUNTER — OFFICE VISIT (OUTPATIENT)
Dept: NEUROLOGY | Facility: CLINIC | Age: 76
End: 2021-01-18

## 2021-01-18 ENCOUNTER — TELEPHONE (OUTPATIENT)
Dept: NEUROLOGY | Facility: CLINIC | Age: 76
End: 2021-01-18

## 2021-01-18 ENCOUNTER — LAB (OUTPATIENT)
Dept: LAB | Facility: HOSPITAL | Age: 76
End: 2021-01-18

## 2021-01-18 VITALS
DIASTOLIC BLOOD PRESSURE: 80 MMHG | WEIGHT: 182 LBS | HEIGHT: 64 IN | RESPIRATION RATE: 18 BRPM | BODY MASS INDEX: 31.07 KG/M2 | HEART RATE: 84 BPM | SYSTOLIC BLOOD PRESSURE: 140 MMHG

## 2021-01-18 DIAGNOSIS — I63.9 CEREBROVASCULAR ACCIDENT (CVA), UNSPECIFIED MECHANISM (HCC): Primary | ICD-10-CM

## 2021-01-18 DIAGNOSIS — I63.9 CEREBROVASCULAR ACCIDENT (CVA), UNSPECIFIED MECHANISM (HCC): ICD-10-CM

## 2021-01-18 DIAGNOSIS — R09.02 HYPOXIA: ICD-10-CM

## 2021-01-18 DIAGNOSIS — R94.01 ABNORMAL EEG: ICD-10-CM

## 2021-01-18 DIAGNOSIS — G47.9 SLEEP DISTURBANCE: ICD-10-CM

## 2021-01-18 DIAGNOSIS — R90.89 ABNORMAL FINDING ON MRI OF BRAIN: ICD-10-CM

## 2021-01-18 PROCEDURE — 36415 COLL VENOUS BLD VENIPUNCTURE: CPT

## 2021-01-18 PROCEDURE — 82746 ASSAY OF FOLIC ACID SERUM: CPT

## 2021-01-18 PROCEDURE — 80177 DRUG SCRN QUAN LEVETIRACETAM: CPT

## 2021-01-18 PROCEDURE — 82607 VITAMIN B-12: CPT

## 2021-01-18 PROCEDURE — 99214 OFFICE O/P EST MOD 30 MIN: CPT | Performed by: PSYCHIATRY & NEUROLOGY

## 2021-01-18 RX ORDER — LEVETIRACETAM 500 MG/1
500 TABLET ORAL 2 TIMES DAILY
COMMUNITY
End: 2021-01-18 | Stop reason: SDUPTHER

## 2021-01-18 RX ORDER — LEVETIRACETAM 500 MG/1
500 TABLET ORAL 2 TIMES DAILY
Qty: 60 TABLET | Refills: 5 | Status: SHIPPED | OUTPATIENT
Start: 2021-01-18 | End: 2021-03-23 | Stop reason: SDUPTHER

## 2021-01-18 RX ORDER — ROSUVASTATIN CALCIUM 20 MG/1
20 TABLET, COATED ORAL DAILY
COMMUNITY
Start: 2020-11-18

## 2021-01-18 NOTE — TELEPHONE ENCOUNTER
Caller: PT    Relationship: SELF    Best call back number: [phone]    What medications are you currently taking:   Current Outpatient Medications on File Prior to Visit   Medication Sig Dispense Refill   • acetaminophen-codeine (TYLENOL #3) 300-30 MG per tablet Take 1 tablet by mouth Every 8 (Eight) Hours As Needed for Moderate Pain . 90 tablet 0   • apixaban (ELIQUIS) 2.5 MG tablet tablet Take 1 tablet by mouth Every 12 (Twelve) Hours. Indications: ESUS 60 tablet 0   • aspirin 81 MG EC tablet Take 81 mg by mouth Daily.     • ferrous sulfate 325 (65 FE) MG tablet Take 325 mg by mouth Daily With Breakfast.     • fluticasone (FLONASE) 50 MCG/ACT nasal spray USE 1 SPRAY IN THE NOSTRILS BID  3   • hydroCHLOROthiazide (HYDRODIURIL) 12.5 MG tablet Take 12.5 mg by mouth Daily.     • levETIRAcetam (KEPPRA) 500 MG tablet Take 1 tablet by mouth 2 (Two) Times a Day. 60 tablet 5   • losartan (COZAAR) 100 MG tablet Take 100 mg by mouth Daily.     • omeprazole (priLOSEC) 20 MG capsule Take 20 mg by mouth 2 (Two) Times a Day.     • rosuvastatin (CRESTOR) 20 MG tablet      • [DISCONTINUED] levETIRAcetam (KEPPRA) 500 MG tablet Take 1 tablet by mouth Every 12 (Twelve) Hours for 30 days. 60 tablet 3   • [DISCONTINUED] levETIRAcetam (KEPPRA) 500 MG tablet Take 500 mg by mouth 2 (Two) Times a Day.     • [DISCONTINUED] vitamin B-12 (CYANOCOBALAMIN) 100 MCG tablet Take 50 mcg by mouth Daily.       No current facility-administered medications on file prior to visit.           Who prescribed you this medication:     What are your concerns: PATIENT WOULD LIKE FURTHER CLARIFICATION ON AFTER VISIT SUMMARY FROM TODAY AND WANTS TO KNOW WHAT MEDICATION SHE IS TO  AT [facility]

## 2021-01-18 NOTE — PATIENT INSTRUCTIONS
Patient to get to emergency room immediately if stroke/seizure symptoms occur.  Not to be driving until released.  Patient have seizure precautions with no climbing and no use of sharp cutting tools cautious around working in hot fire/stove/grill/water.  Patient to take showers not baths and not swim by self or getting hot tub by self.  Patient to get with PCP evaluate diet controlled.

## 2021-01-18 NOTE — PROGRESS NOTES
Subjective   Pascale Jimenez, 1945, is a female who is being seen today for   Chief Complaint   Patient presents with   • Stroke   • Seizures       HISTORY OF PRESENT ILLNESS: Extended follow-up.  Patient was in the hospital 2 times in November 1 on 14 November where an MRI of the brain showed right temporal abnormality which did not contrast-enhanced.  Patient was on aspirin and Plavix at the time and then was readmitted on 11/30 with right occipital stroke.  The MRI reviewed again showing the right temporal abnormality but not changed.  Patient had one EEG 3 that showed a right temporal spike according to Dr. Дмитрий Zhou and was started on Keppra which she is taking 500 mg twice daily.  Patient has had no stroke symptoms or seizure symptoms since.  Patient is tolerating Keppra without difficulty.  Patient has been switched to Eliquis and aspirin.  Zio patch show PAT and some extrasystoles but nothing apparently clinically significant and no atrial fib.  Patient's echocardiogram showed impaired relaxation concentric hypertrophy but no PFO.  Patient had CTA of the head and neck which show right caliber PCA decreasing suddenly possibly related to small thrombus.  Patient has been driving and still not to be driving till formal visual fields are done.    REVIEW OF SYSTEMS:   GENERAL: Blood pressure 140/80 left arm seated with 140/80 left arm standing and pulse 84 and regular  PULMONARY: Patient was hypoxic during hospitalization and was given oxygen prescription but did not fill it.  Patient had groundglass appearance to the left upper lobe of the lung and was positive for Covid but did not have symptoms.  CVS: As above  GASTROINTESTINAL: No acute GI distress  GENITOURINARY: No acute  distress  GYN: No acute GYN distress  MUSCULOSKELETAL: No acute musculoskeletal symptoms  HEENT: As above  ENDOCRINE: Patient mentioned to be diabetic  PSYCHIATRIC: No acute psychiatric symptoms  HEMATOLOGY: Borderline  anemic  SKIN: No acute skin changes  Family history reviewed and otherwise noncontributory to this problem  Social history: Patient denies smoking and drug or alcohol use    PHYSICAL EXAMINATION:    GENERAL: No acute distress  CRANIUM: Normal cephalic/atraumatic  HEENT:       EYES: Patient may have left lower quadrantanopia both eyes have possibly a relative left upper quadrantanopia.  No acute fundic abnormalities.  EOMs intact without nystagmus.  No acute fundic abnormalities.       EARS: Tympanic membranes normal and hears tuning fork bilaterally.  Mallampati 2       THROAT: No acute oropharynx abnormalities and no swallowing difficulties by history       NECK: No bruits/no lymphadenopathy  CHEST: No acute cardiopulmonary abnormalities by auscultation  ABDOMEN: Nondistended  EXTREMITIES: Dorsalis pedis pulse symmetrical  NEURO: Patient alert and follows commands without difficulty  SPEECH: Normal    CRANIAL NERVES: Motor/sensory about the face normal and symmetric    MOTOR STRENGTH: Motor strength upper and lower extremities normal  STATION AND GAIT: Gait is slightly wide-based but no tendency to fall and Romberg negative  CEREBELLAR: Finger-nose and heel shin normal  SENSORY: Some decrease in vibration in the toes bilaterally otherwise normal pin and vibration throughout  REFLEXES: Decreased but present reflexes throughout upper and lower extremity without clonus or Babinski      ASSESSMENT AND PLAN: Patient with right occipital stroke as above.  Patient not to be driving until released.  Patient to get to emergency room immediately if stroke or seizure symptoms occur.  Patient to check Keppra level.  Patient to do overnight continuous oximetry for history of hypoxia and some sleep disturbance getting up to the bathroom.  Seizure precautions reviewed.  Spent 40 minutes on this patient with record review counseling and exam.Patient's Body mass index is 31.24 kg/m². BMI is above normal parameters. Recommendations  include: referral to primary care.  PCP is writing patient's Crestor and Eliquis.  If patient's blood work stable patient to get repeat MRI brain with and without contrast looking at the right temporal area.  Patient says she did not have any problems with contrast previously.  Again I reviewed potential risks of the contrast in detail including heart attack/stroke/death/kidney dysfunction and respiratory dysfunction and patient willing to proceed with study.        Diagnoses and all orders for this visit:    1. Cerebrovascular accident (CVA), unspecified mechanism (CMS/HCC) (Primary)  -     Levetiracetam Level (Keppra); Future  -     Ambulatory Referral to Ophthalmology  -     Vitamin B12; Future  -     Folate; Future    2. Hypoxia  -     Overnight Sleep Oximetry Study; Future    3. Sleep disturbance  -     Overnight Sleep Oximetry Study; Future    4. Abnormal EEG  -     Levetiracetam Level (Keppra); Future    Other orders  -     levETIRAcetam (KEPPRA) 500 MG tablet; Take 1 tablet by mouth 2 (Two) Times a Day.  Dispense: 60 tablet; Refill: 5        Dictated utilizing Dragon voice recognition software

## 2021-01-19 LAB
FOLATE SERPL-MCNC: >20 NG/ML (ref 4.78–24.2)
LEVETIRACETAM SERPL-MCNC: 21 UG/ML (ref 10–40)
VIT B12 BLD-MCNC: 537 PG/ML (ref 211–946)

## 2021-01-19 NOTE — TELEPHONE ENCOUNTER
Pascale notified Dr. Beach refilled Kanika, the directions were 1 q 12 hours, the new script is 1 BID.  She said Tylenol #3 is on her med list and she doesn't take it.  She also requested medical records, notified I contacted medical records at the hospital, they will mail her a release form to fill out and return.

## 2021-03-23 DIAGNOSIS — R94.01 ABNORMAL EEG: Primary | ICD-10-CM

## 2021-03-23 RX ORDER — LEVETIRACETAM 500 MG/1
500 TABLET ORAL 2 TIMES DAILY
Qty: 180 TABLET | Refills: 1 | Status: SHIPPED | OUTPATIENT
Start: 2021-03-23 | End: 2021-04-23 | Stop reason: SDUPTHER

## 2021-04-16 ENCOUNTER — HOSPITAL ENCOUNTER (OUTPATIENT)
Dept: MRI IMAGING | Facility: HOSPITAL | Age: 76
Discharge: HOME | End: 2021-04-16
Admitting: PSYCHIATRY & NEUROLOGY

## 2021-04-16 DIAGNOSIS — I63.9 CEREBROVASCULAR ACCIDENT (CVA), UNSPECIFIED MECHANISM (HCC): ICD-10-CM

## 2021-04-16 DIAGNOSIS — R90.89 ABNORMAL FINDING ON MRI OF BRAIN: ICD-10-CM

## 2021-04-16 LAB — CREAT BLDA-MCNC: 0.6 MG/DL (ref 0.6–1.3)

## 2021-04-16 PROCEDURE — A9577 INJ MULTIHANCE: HCPCS | Performed by: PSYCHIATRY & NEUROLOGY

## 2021-04-16 PROCEDURE — 82565 ASSAY OF CREATININE: CPT

## 2021-04-16 PROCEDURE — 70553 MRI BRAIN STEM W/O & W/DYE: CPT

## 2021-04-16 PROCEDURE — 0 GADOBENATE DIMEGLUMINE 529 MG/ML SOLUTION: Performed by: PSYCHIATRY & NEUROLOGY

## 2021-04-16 RX ADMIN — GADOBENATE DIMEGLUMINE 20 ML: 529 INJECTION, SOLUTION INTRAVENOUS at 09:07

## 2021-04-23 ENCOUNTER — OFFICE VISIT (OUTPATIENT)
Dept: NEUROLOGY | Facility: CLINIC | Age: 76
End: 2021-04-23

## 2021-04-23 VITALS
SYSTOLIC BLOOD PRESSURE: 118 MMHG | BODY MASS INDEX: 28.85 KG/M2 | WEIGHT: 169 LBS | HEART RATE: 78 BPM | RESPIRATION RATE: 18 BRPM | DIASTOLIC BLOOD PRESSURE: 78 MMHG | HEIGHT: 64 IN

## 2021-04-23 DIAGNOSIS — R09.02 HYPOXIA: ICD-10-CM

## 2021-04-23 DIAGNOSIS — H53.483 BILATERAL VISUAL FIELD CONSTRICTION: ICD-10-CM

## 2021-04-23 DIAGNOSIS — R94.01 ABNORMAL EEG: ICD-10-CM

## 2021-04-23 DIAGNOSIS — G47.9 SLEEP DISTURBANCE: ICD-10-CM

## 2021-04-23 DIAGNOSIS — I63.9 CEREBROVASCULAR ACCIDENT (CVA), UNSPECIFIED MECHANISM (HCC): Primary | ICD-10-CM

## 2021-04-23 PROCEDURE — 99215 OFFICE O/P EST HI 40 MIN: CPT | Performed by: PSYCHIATRY & NEUROLOGY

## 2021-04-23 RX ORDER — LEVETIRACETAM 500 MG/1
500 TABLET ORAL 2 TIMES DAILY
Qty: 180 TABLET | Refills: 1 | Status: SHIPPED | OUTPATIENT
Start: 2021-04-23 | End: 2021-10-12 | Stop reason: SDUPTHER

## 2021-04-23 NOTE — PROGRESS NOTES
Subjective   Pascale Jimenez, 1945, is a female who is being seen today for   Chief Complaint   Patient presents with   • Seizures   • Stroke       HISTORY OF PRESENT ILLNESS: Extended follow-up.  Patient multiple problems addressed.  She has had no further stroke symptoms or seizures.  She saw Dr. Roman apparently an ophthalmologist in [place] said that she had a reading of 70 degrees in the left temporal reading of 70 degrees in the right but saying she was allowed to drive.  Visual fields are obtained which still show visual field deficits in both eyes more on the right than the left.  Patient says she may have the full report and is to get that to us.  According to the records that patient hands me from this position the patient has a total field of vision 140 degrees or greater qualification with no restrictions for the [facility]  support.  Patient is referred again to Dr. Vásquez to follow-up with visual fields for determining ability to drive.  Patient has evidence of hypoxia and is on 2 L oxygen with no further overnight continuous oximetry on the oxygen done yet.  Patient's overnight continuous oximetry in January showed low of 69% with 46 minutes and 54 seconds less than or equal to 89% oxygen saturation and 27 minutes 13 seconds less than or equal to 88% oxygen saturation.  She has a sleep test scheduled.  Patient continues the Keppra 500 mg p.o. twice daily with Keppra level in therapeutic range.  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  The right temporal lobe was the location of the spike abnormality as noted per EEG.  Patient on Eliquis aspirin and statin    REVIEW OF SYSTEMS:   GENERAL: Blood pressure today 118/72 left arm seated 118/70 left arm standing with pulse 78.  PULMONARY: As above  CVS: No acute chest pain or palpitation  GASTROINTESTINAL: No acute GI distress  GENITOURINARY:  No acute  distress  GYN: No acute GYN distress  MUSCULOSKELETAL: No acute musculoskeletal symptoms  HEENT: As above  ENDOCRINE: No acute endocrine symptoms  PSYCHIATRIC: No acute psychiatric symptoms  HEMATOLOGY: Patient has some elevation of one liver enzyme and is to follow-up with PCP.  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  Patient tends to shift her eyes quite a bit to count fingers       EARS: Tympanic membranes normal and hears tuning fork bilaterally       THROAT: No acute oropharynx abnormalities no swallowing difficulty by history       NECK: No bruits/no lymphadenopathy  CHEST: No acute cardiopulmonary abnormalities by auscultation  ABDOMEN: Nondistended  EXTREMITIES: Dorsalis pedis pulse symmetrical  NEURO: Patient alert and follows commands without difficulty  SPEECH: Normal    CRANIAL NERVES: Motor/sensory about the face normal and symmetric    MOTOR STRENGTH: Motor strength upper and lower extremities normal  STATION AND GAIT: Patient has wide-based gait but no tendency to fall and has had no falls.  Romberg negative  CEREBELLAR: Finger-nose and heel-to-shin normal  SENSORY: Decreased pin and vibration distal proximal lower extremities ankles bilaterally otherwise normal pin and vibration throughout  REFLEXES: Decreased reflexes throughout without clonus or Babinski      ASSESSMENT AND PLAN: Patient with history of CVA with visual field defect.  Patient is to be reviewed by Dr. Vásquez for another visual fields.  Patient is to get outpatient physical therapy for her gait difficulties.  Patient has some evidence of neuropathy in the lower  extremities.  Patient to continue Keppra and get to emergency room immediately if stroke symptoms or seizure occurs.  I spent 40 minutes with this patient with counseling and review of records and exam.Patient's Body mass index is 29.01 kg/m². BMI is above normal parameters. Recommendations include: referral to primary care.      Diagnoses and all orders for this visit:    1. Cerebrovascular accident (CVA), unspecified mechanism (CMS/HCC) (Primary)  -     Ambulatory Referral to Physical Therapy Evaluate and treat; Full weight bearing  -     CBC & Differential; Future  -     Comprehensive Metabolic Panel; Future    2. Abnormal EEG  -     levETIRAcetam (KEPPRA) 500 MG tablet; Take 1 tablet by mouth 2 (Two) Times a Day.  Dispense: 180 tablet; Refill: 1    3. Hypoxia  -     Overnight Sleep Oximetry Study; Future    4. Sleep disturbance  -     Overnight Sleep Oximetry Study; Future    5. Bilateral visual field constriction  -     Ambulatory Referral to Ophthalmology    Other orders  -     Cancel: CBC & Differential; Future  -     Cancel: Comprehensive Metabolic Panel; Future  -     Cancel: Levetiracetam Level (Keppra); Future        Dictated utilizing Dragon voice recognition software

## 2021-04-23 NOTE — PATIENT INSTRUCTIONS
Patient not to be driving until released.  Patient to get with PCP about weight and diet control.  Patient had a fall precautions and to get to emergency room immediately if seizure or stroke occurs.

## 2021-05-12 ENCOUNTER — TELEPHONE (OUTPATIENT)
Dept: NEUROLOGY | Facility: CLINIC | Age: 76
End: 2021-05-12

## 2021-05-12 NOTE — TELEPHONE ENCOUNTER
Caller: WATSON     Relationship: PT    Best call back number: [phone]    Caller requesting test results: OVERNIGHT PULSE OX       Where was the test performed: AT PT HOME     Additional notes: RESULTS ARE IN MEDIA      PT STATES THAT DR. PAGE WAS SUPPOSE TO ORDER A SLEEP STUDY, PT WOULD LIKE TO KNOW IF SHE CAN GET A SLEEP STUDY ORDERED IN [place]. PT IS LEAVING June 8-June 19TH, SHE WOULD LIKE TO NOT HAVE ANYTHING SCHEDULED DURING THAT TIME

## 2021-05-13 NOTE — TELEPHONE ENCOUNTER
Pascale said Dr. Dior ordered a sleep study and wanted Dr. Beach to read it.  Explained that she needs to talk to the sleep lab, transferred to them.

## 2021-05-14 ENCOUNTER — TRANSCRIBE ORDERS (OUTPATIENT)
Dept: SLEEP MEDICINE | Facility: HOSPITAL | Age: 76
End: 2021-05-14

## 2021-05-14 DIAGNOSIS — G47.33 OBSTRUCTIVE SLEEP APNEA, ADULT: Primary | ICD-10-CM

## 2021-05-18 ENCOUNTER — HOSPITAL ENCOUNTER (OUTPATIENT)
Dept: SLEEP MEDICINE | Facility: HOSPITAL | Age: 76
Discharge: HOME | End: 2021-05-18
Admitting: PSYCHIATRY & NEUROLOGY

## 2021-05-18 VITALS
HEART RATE: 78 BPM | DIASTOLIC BLOOD PRESSURE: 70 MMHG | BODY MASS INDEX: 28.85 KG/M2 | WEIGHT: 169 LBS | OXYGEN SATURATION: 96 % | SYSTOLIC BLOOD PRESSURE: 150 MMHG | RESPIRATION RATE: 16 BRPM | HEIGHT: 64 IN

## 2021-05-18 DIAGNOSIS — G47.33 OBSTRUCTIVE SLEEP APNEA, ADULT: ICD-10-CM

## 2021-05-18 PROCEDURE — 95810 POLYSOM 6/> YRS 4/> PARAM: CPT | Performed by: PSYCHIATRY & NEUROLOGY

## 2021-05-18 PROCEDURE — 95810 POLYSOM 6/> YRS 4/> PARAM: CPT

## 2021-05-25 ENCOUNTER — TELEPHONE (OUTPATIENT)
Dept: NEUROLOGY | Facility: HOSPITAL | Age: 76
End: 2021-05-25

## 2021-08-05 ENCOUNTER — TELEPHONE (OUTPATIENT)
Dept: NEUROLOGY | Facility: CLINIC | Age: 76
End: 2021-08-05

## 2021-08-05 NOTE — TELEPHONE ENCOUNTER
----- Message from José Antonio Beach MD sent at 8/5/2021  9:25 AM CDT -----  It sounds like patient is having reaction to something with the mask.  Patient needs to get with DME company to see if other options available if not we can just discontinue the use of the Pap device and may be check an overnight continuous oximetry on room air see if she qualifies for oxygen  ----- Message -----  From: Izabel Coates LPN  Sent: 8/5/2021   9:05 AM CDT  To: José Antonio Beach MD    Pascale said she tried using a nasal canula mask for PAP therapy first.  She woke up several times during the night because her mouth was dry.  She started using a full face mask, her lip was swollen almost to her nose, her lips felt like sunburned skin, and peeled.  Her PCP prescribed a medication for her lips that [facility] makes and she used a steroid dose pack.  She has to use the medication on her lips multiple times a day, she also tried multiple OTC medications for dry lips.  Her lips feel like a balloon with the air let out of it and the corners of her mouth hurt when she opens her mouth.  She has 6 white pin point spots on her lips and her entire lips are dry.  She has been using the medication her PCP prescribed since 6-24.  At first the PAP machine didn't use any water, then it sprayed water in her face, it also used different amounts of water at night.  They adjusted the machine and it uses the same amount of water each night.  She got a new mask today and a filter to go between her and the mask.  The mask has memory foam on it.  She wants to know if you have any suggestions about what could cause the problems with her lips.

## 2021-08-05 NOTE — TELEPHONE ENCOUNTER
Provider: MARILYN  Caller: PT  Relationship to Patient: SELF  Pharmacy: N/A  Phone Number: [phone]  Reason for Call: PT RETURNED MISSED CALL FROM OFFICE; PLEASE CALL.    THANK YOU.

## 2021-08-05 NOTE — TELEPHONE ENCOUNTER
Pascale notified.  She said she will contact [facility] again and ask about other options with the mask.  She didn't notice any improvement with the new mask and a filter.

## 2021-08-06 NOTE — TELEPHONE ENCOUNTER
Micah said Pascale had the break out on her mouth before she started PAP therapy.  They have been working with her to find a mask that works.  Pascale said she thinks the new mask is better, she didn't think it helped the first night she used it.

## 2021-08-09 ENCOUNTER — TELEPHONE (OUTPATIENT)
Dept: NEUROLOGY | Facility: CLINIC | Age: 76
End: 2021-08-09

## 2021-08-09 NOTE — TELEPHONE ENCOUNTER
Provider: MARILYN  Caller: PT  Relationship to Patient: SELF  Pharmacy: N/A  Phone Number: [phone]  Reason for Call: PT REC'D LETTER STATING PT NEEDED TO GET HER LABS DONE; PT SAID THIS HAS BEEN COMPLETED.    PLEASE REVIEW & ADVISE.    THANK YOU.

## 2021-08-19 ENCOUNTER — OFFICE VISIT (OUTPATIENT)
Dept: NEUROLOGY | Facility: CLINIC | Age: 76
End: 2021-08-19

## 2021-08-19 VITALS
BODY MASS INDEX: 30.56 KG/M2 | DIASTOLIC BLOOD PRESSURE: 90 MMHG | WEIGHT: 179 LBS | SYSTOLIC BLOOD PRESSURE: 140 MMHG | OXYGEN SATURATION: 99 % | HEART RATE: 74 BPM | HEIGHT: 64 IN

## 2021-08-19 DIAGNOSIS — G47.33 OBSTRUCTIVE SLEEP APNEA ON CPAP: Primary | ICD-10-CM

## 2021-08-19 DIAGNOSIS — K13.0 CHAPPED LIPS: ICD-10-CM

## 2021-08-19 DIAGNOSIS — Z99.89 OBSTRUCTIVE SLEEP APNEA ON CPAP: Primary | ICD-10-CM

## 2021-08-19 PROCEDURE — 99213 OFFICE O/P EST LOW 20 MIN: CPT | Performed by: NURSE PRACTITIONER

## 2021-08-19 RX ORDER — DIPHENOXYLATE HYDROCHLORIDE AND ATROPINE SULFATE 2.5; .025 MG/1; MG/1
1 TABLET ORAL DAILY
COMMUNITY
End: 2021-10-20

## 2021-08-19 NOTE — PATIENT INSTRUCTIONS
Sleep Apnea  Sleep apnea affects breathing during sleep. It causes breathing to stop for a short time or to become shallow. It can also increase the risk of:  · Heart attack.  · Stroke.  · Being very overweight (obese).  · Diabetes.  · Heart failure.  · Irregular heartbeat.  The goal of treatment is to help you breathe normally again.  What are the causes?  There are three kinds of sleep apnea:  · Obstructive sleep apnea. This is caused by a blocked or collapsed airway.  · Central sleep apnea. This happens when the brain does not send the right signals to the muscles that control breathing.  · Mixed sleep apnea. This is a combination of obstructive and central sleep apnea.  The most common cause of this condition is a collapsed or blocked airway. This can happen if:  · Your throat muscles are too relaxed.  · Your tongue and tonsils are too large.  · You are overweight.  · Your airway is too small.  What increases the risk?  · Being overweight.  · Smoking.  · Having a small airway.  · Being older.  · Being male.  · Drinking alcohol.  · Taking medicines to calm yourself (sedatives or tranquilizers).  · Having family members with the condition.  What are the signs or symptoms?  · Trouble staying asleep.  · Being sleepy or tired during the day.  · Getting angry a lot.  · Loud snoring.  · Headaches in the morning.  · Not being able to focus your mind (concentrate).  · Forgetting things.  · Less interest in sex.  · Mood swings.  · Personality changes.  · Feelings of sadness (depression).  · Waking up a lot during the night to pee (urinate).  · Dry mouth.  · Sore throat.  How is this diagnosed?  · Your medical history.  · A physical exam.  · A test that is done when you are sleeping (sleep study). The test is most often done in a sleep lab but may also be done at home.  How is this treated?    · Sleeping on your side.  · Using a medicine to get rid of mucus in your nose (decongestant).  · Avoiding the use of alcohol,  medicines to help you relax, or certain pain medicines (narcotics).  · Losing weight, if needed.  · Changing your diet.  · Not smoking.  · Using a machine to open your airway while you sleep, such as:  ? An oral appliance. This is a mouthpiece that shifts your lower jaw forward.  ? A CPAP device. This device blows air through a mask when you breathe out (exhale).  ? An EPAP device. This has valves that you put in each nostril.  ? A BPAP device. This device blows air through a mask when you breathe in (inhale) and breathe out.  · Having surgery if other treatments do not work.  It is important to get treatment for sleep apnea. Without treatment, it can lead to:  · High blood pressure.  · Coronary artery disease.  · In men, not being able to have an erection (impotence).  · Reduced thinking ability.  Follow these instructions at home:  Lifestyle  · Make changes that your doctor recommends.  · Eat a healthy diet.  · Lose weight if needed.  · Avoid alcohol, medicines to help you relax, and some pain medicines.  · Do not use any products that contain nicotine or tobacco, such as cigarettes, e-cigarettes, and chewing tobacco. If you need help quitting, ask your doctor.  General instructions  · Take over-the-counter and prescription medicines only as told by your doctor.  · If you were given a machine to use while you sleep, use it only as told by your doctor.  · If you are having surgery, make sure to tell your doctor you have sleep apnea. You may need to bring your device with you.  · Keep all follow-up visits as told by your doctor. This is important.  Contact a doctor if:  · The machine that you were given to use during sleep bothers you or does not seem to be working.  · You do not get better.  · You get worse.  Get help right away if:  · Your chest hurts.  · You have trouble breathing in enough air.  · You have an uncomfortable feeling in your back, arms, or stomach.  · You have trouble talking.  · One side of your  body feels weak.  · A part of your face is hanging down.  These symptoms may be an emergency. Do not wait to see if the symptoms will go away. Get medical help right away. Call your local emergency services (911 in the [place] Do not drive yourself to the hospital.  Summary  · This condition affects breathing during sleep.  · The most common cause is a collapsed or blocked airway.  · The goal of treatment is to help you breathe normally while you sleep.  This information is not intended to replace advice given to you by your health care provider. Make sure you discuss any questions you have with your health care provider.  Document Revised: 10/04/2019 Document Reviewed: 08/13/2019  [employer] Patient Education © 2021 [employer]

## 2021-08-19 NOTE — PROGRESS NOTES
Neurology Progress Note      Chief Complaint:    Obstructive sleep apnea     Subjective     Subjective:  Pascale Jimenez is a 76 y.o. female who presents to the office today for obstructive sleep apnea.  She is referred for polysomnography by Alma Rosa Dior DO. This was performed on 5/19/2021.  She is having symptoms such as dry mouth, observed apneas, history of seizures, and nocturnal hypoxia.  Results of polysomnography showed an AHI of 15.8 indicating obstructive sleep apnea.    She presents today doing well with therapy but does have complaints of chapped lips from the device.  She uses a full face mask and notes that her lips are routinely chapped from this and painful.  She states she uses lubricants and chap stick often with no relief.  She states that her PCP has also been following and has sent in compounded creams for it as well with no relief. She states that she has tried multiple full face masks with no changes.  She does mention having used a nasal pillow before which, assuredly, didn't cause chapped lips.  She notes she was unable to tolerate this due to a dry mouth from her mouth opening frequently when sleeping.  She never used a chin strap.  She denies daytime hypersomnolence, falling asleep during activities, or non-restorative sleep.  She does still complain of fragmented sleep but primarily thinks this is due to her lips and not sleep in general.      Past Medical History:   Diagnosis Date   • Colon polyp    • Diabetes mellitus (CMS/HCC)    • Diverticulitis    • Duodenal ulcer    • GERD (gastroesophageal reflux disease)    • Hemorrhoids    • Histoplasmosis    • Hyperlipidemia    • Hypertension      Past Surgical History:   Procedure Laterality Date   • APPENDECTOMY     • CHOLECYSTECTOMY     • COLONOSCOPY  03/25/2014   • ENDOSCOPY  07/14/2015   • HYSTERECTOMY     • REPLACEMENT TOTAL KNEE     • SPINE SURGERY     • TOTAL HIP ARTHROPLASTY Bilateral     x 2   • TUBAL ABDOMINAL LIGATION       Family  History   Problem Relation Age of Onset   • Heart disease Mother    • Heart disease Father    • Cancer Father    • Colon cancer Neg Hx    • Colon polyps Neg Hx      Social History     Tobacco Use   • Smoking status: Former Smoker   • Smokeless tobacco: Never Used   Substance Use Topics   • Alcohol use: No   • Drug use: No     Medications:  Current Outpatient Medications   Medication Sig Dispense Refill   • apixaban (ELIQUIS) 2.5 MG tablet tablet Take 1 tablet by mouth Every 12 (Twelve) Hours. Indications: ESUS 60 tablet 0   • aspirin 81 MG EC tablet Take 81 mg by mouth Daily.     • fluticasone (FLONASE) 50 MCG/ACT nasal spray USE 1 SPRAY IN THE NOSTRILS BID  3   • hydroCHLOROthiazide (HYDRODIURIL) 12.5 MG tablet Take 12.5 mg by mouth Daily.     • levETIRAcetam (KEPPRA) 500 MG tablet Take 1 tablet by mouth 2 (Two) Times a Day. 180 tablet 1   • losartan (COZAAR) 100 MG tablet Take 100 mg by mouth Daily.     • Multiple Vitamins-Minerals (EYE VITAMINS PO) Take  by mouth Daily.     • multivitamin (MULTI-VITAMIN PO) Take 1 tablet by mouth Daily.     • omeprazole (priLOSEC) 20 MG capsule Take 20 mg by mouth As Needed.     • rosuvastatin (CRESTOR) 20 MG tablet Take 20 mg by mouth Daily.       No current facility-administered medications for this visit.     Current outpatient and discharge medications have been reconciled for the patient.  Reviewed by: JUAN Davis      Allergies:    Demerol [meperidine], Morphine and related, Stadol [butorphanol], Talwin [pentazocine], Adhesive tape, and Prednisone    Review of Systems:   Review of Systems   Psychiatric/Behavioral: Positive for sleep disturbance.   All other systems reviewed and are negative.    Objective      Vital Signs  Heart Rate:  [74] 74  BP: (140)/(90) 140/90    Physical Exam:  Physical Exam  Constitutional:       Appearance: Normal appearance.   HENT:      Head: Normocephalic.   Eyes:      Extraocular Movements: Extraocular movements intact.      Pupils:  Pupils are equal, round, and reactive to light.   Cardiovascular:      Rate and Rhythm: Normal rate and regular rhythm.      Pulses: Normal pulses.   Pulmonary:      Effort: Pulmonary effort is normal.   Musculoskeletal:         General: Normal range of motion.      Cervical back: Normal range of motion and neck supple.   Skin:     General: Skin is warm and dry.      Capillary Refill: Capillary refill takes less than 2 seconds.   Neurological:      Gait: Gait is intact.   Psychiatric:         Mood and Affect: Mood normal.         Neck Circumference: 13.5 inches  Mallampati Classification: II (hard and soft palate, upper portion of tonsils anduvula visible)       Results Review:    Polysomnography 4 or More Parameters (05/19/2021 02:25)    [place] Sleepiness Scale: 0    STOP-BANG: Moderate Risk SILVIA    Compliance Report:   This compliance report is for the dates of 7/19/2021-8/17/2021.  The patient used the PAP device for 30/30 days for a 100% compliance.  Of those days, the device was used for greater than 4 hours for 27 days for a 90% compliance.  The patient is set on APAP with a minimum pressure of 8 cm H2O to a maximum pressure of 16 cm H2O average pressure used was 8.9 cm H2O.  AHI is currently 2.6    Assessment/Plan     Impression:  Obstructive sleep apnea  Appropriate compliance with CPAP    Plan:  Continue with CPAP therapy. The patient recognizes the need for adherence to the prescribed therapy.       I have recommended she consult with her DME to try and use a nasal pillow with chin strap to help alleviate the chapped lips with her machine.  Otherwise, continue lubricants and other therapies as prescribed by her PCP.     I have recommended regular cardiovascular exercise in the form of walking, biking or swimming 30-40 minutes at a time at least 3-4 times per week.    Counseled on multimodal approach to treatment of sleep apnea to include but not limited to diet, exercise, sleep hygiene, compliance with pap  therapy.     Encouraged lateral sleeping position and to avoid sedatives or alcohol close to bedtime.     Risks of untreated sleep apnea were discussed to include but not limited to HTN, heart disease, stroke, cardiac arrhythmia such as AFIB, and dementia.    I have reviewed the current compliance download with the patient in detail.  She  understands that a certain level of compliance allows for continued insurance coverage as well as adequate treatment of underlying apnea.  She also understands the impact this has upon their overall health status and other medical comorbidities.     The plan of care was fully discussed with the patient and they are in full agreement at this time.     Follow-Up:  Keep f/u with Dr. Beach.      Yasmani Aceves, APRN  08/19/21  14:54 CDT

## 2021-09-08 ENCOUNTER — TELEPHONE (OUTPATIENT)
Dept: VASCULAR SURGERY | Facility: CLINIC | Age: 76
End: 2021-09-08

## 2021-09-08 NOTE — TELEPHONE ENCOUNTER
Spoke with  Barbara reminding him of his appointment for Thursday, September 9th, 2021 at 915 am with Dr Higuera.  Barbara confirmed she would be here.

## 2021-09-09 ENCOUNTER — OFFICE VISIT (OUTPATIENT)
Dept: VASCULAR SURGERY | Facility: CLINIC | Age: 76
End: 2021-09-09

## 2021-09-09 VITALS
OXYGEN SATURATION: 97 % | SYSTOLIC BLOOD PRESSURE: 122 MMHG | DIASTOLIC BLOOD PRESSURE: 78 MMHG | WEIGHT: 180 LBS | HEIGHT: 64 IN | HEART RATE: 82 BPM | BODY MASS INDEX: 30.73 KG/M2

## 2021-09-09 DIAGNOSIS — E11.9 TYPE 2 DIABETES MELLITUS WITHOUT COMPLICATION, WITHOUT LONG-TERM CURRENT USE OF INSULIN (HCC): ICD-10-CM

## 2021-09-09 DIAGNOSIS — I65.23 BILATERAL CAROTID ARTERY STENOSIS: ICD-10-CM

## 2021-09-09 DIAGNOSIS — I73.9 PAD (PERIPHERAL ARTERY DISEASE) (HCC): Primary | ICD-10-CM

## 2021-09-09 DIAGNOSIS — I10 ESSENTIAL HYPERTENSION: ICD-10-CM

## 2021-09-09 DIAGNOSIS — E78.5 HYPERLIPIDEMIA, UNSPECIFIED HYPERLIPIDEMIA TYPE: ICD-10-CM

## 2021-09-09 PROCEDURE — 99204 OFFICE O/P NEW MOD 45 MIN: CPT | Performed by: SURGERY

## 2021-09-09 NOTE — PROGRESS NOTES
09/09/2021      Car Landeros MD  [address] [place] [id number]    Pascale Jimenez  1945    Chief Complaint   Patient presents with   • Establish Care     Referred over by Dr Landeros for Abnormal JAY. Patient denies any stroke like symptoms.    • Former Smoker     Patient is a Former Smoker    • other     Patient states her insurance company came by put a thing on her finger and toe and told her she had abnormal testing Left was 84 and Right was 65. Patient states she isnt having any problems with her legs, no pain, no cramping, or anything. Patient states she has bilateral hip replacement and right knee replacement    • Med Management     Verbally verified medications with patient        Dear Car Landeros MD    HPI  I had the pleasure of seeing your patient Pascale Jimenez in the office today.  Thank you kindly for this consultation.  As you recall, Pascale Jimenez is a 76 y.o.  female who you are currently following for routine health maintenance.  She reports having a lower extremity screening test at her house showing evidence of moderate vascular disease in her right lower extremity and mild on the left side.  She denies any claudication to her lower extremities.  She is maintained on aspirin, Eliquis, and Crestor.  She does have a history of stroke in 2020.        Past Medical History:   Diagnosis Date   • Colon polyp    • Diabetes mellitus (CMS/HCC)    • Diverticulitis    • Duodenal ulcer    • GERD (gastroesophageal reflux disease)    • Hemorrhoids    • Histoplasmosis    • Hyperlipidemia    • Hypertension        Past Surgical History:   Procedure Laterality Date   • APPENDECTOMY     • CHOLECYSTECTOMY     • COLONOSCOPY  03/25/2014   • ENDOSCOPY  07/14/2015   • HYSTERECTOMY     • REPLACEMENT TOTAL KNEE     • SPINE SURGERY     • TOTAL HIP ARTHROPLASTY Bilateral     x 2   • TUBAL ABDOMINAL LIGATION         Family History   Problem Relation Age of Onset   • Heart disease  Mother    • Heart disease Father    • Cancer Father    • Colon cancer Neg Hx    • Colon polyps Neg Hx        Social History     Socioeconomic History   • Marital status:      Spouse name: Not on file   • Number of children: Not on file   • Years of education: Not on file   • Highest education level: Not on file   Tobacco Use   • Smoking status: Former Smoker   • Smokeless tobacco: Never Used   Substance and Sexual Activity   • Alcohol use: No   • Drug use: No   • Sexual activity: Defer       Allergies   Allergen Reactions   • Demerol [Meperidine] Nausea And Vomiting   • Morphine And Related Nausea And Vomiting   • Stadol [Butorphanol] Other (See Comments)     Pt does not recall   • Talwin [Pentazocine] Mental Status Change   • Adhesive Tape Rash   • Prednisone Other (See Comments)     Pt does not recall         Current Outpatient Medications:   •  apixaban (ELIQUIS) 2.5 MG tablet tablet, Take 1 tablet by mouth Every 12 (Twelve) Hours. Indications: ESUS, Disp: 60 tablet, Rfl: 0  •  aspirin 81 MG EC tablet, Take 81 mg by mouth Daily., Disp: , Rfl:   •  fluticasone (FLONASE) 50 MCG/ACT nasal spray, USE 1 SPRAY IN THE NOSTRILS BID, Disp: , Rfl: 3  •  hydroCHLOROthiazide (HYDRODIURIL) 12.5 MG tablet, Take 12.5 mg by mouth Daily., Disp: , Rfl:   •  levETIRAcetam (KEPPRA) 500 MG tablet, Take 1 tablet by mouth 2 (Two) Times a Day., Disp: 180 tablet, Rfl: 1  •  losartan (COZAAR) 100 MG tablet, Take 100 mg by mouth Daily., Disp: , Rfl:   •  Multiple Vitamins-Minerals (EYE VITAMINS PO), Take  by mouth Daily., Disp: , Rfl:   •  multivitamin (MULTI-VITAMIN PO), Take 1 tablet by mouth Daily., Disp: , Rfl:   •  omeprazole (priLOSEC) 20 MG capsule, Take 20 mg by mouth As Needed., Disp: , Rfl:   •  rosuvastatin (CRESTOR) 20 MG tablet, Take 20 mg by mouth Daily., Disp: , Rfl:     Review of Systems   Constitutional: Negative.    HENT: Negative.    Eyes: Negative.    Respiratory: Negative.    Cardiovascular: Negative.   "  Gastrointestinal: Negative.    Endocrine: Negative.    Genitourinary: Negative.    Musculoskeletal: Negative.    Skin: Negative.    Allergic/Immunologic: Negative.    Neurological: Negative.    Hematological: Negative.    Psychiatric/Behavioral: Negative.    All other systems reviewed and are negative.    /78 (BP Location: Right arm, Patient Position: Sitting, Cuff Size: Adult)   Pulse 82   Ht 162.6 cm (64\")   Wt 81.6 kg (180 lb)   SpO2 97%   BMI 30.90 kg/m²     Physical Exam  Vitals and nursing note reviewed.   Constitutional:       Appearance: Normal appearance. She is well-developed.   HENT:      Head: Normocephalic and atraumatic.   Eyes:      General: No scleral icterus.     Pupils: Pupils are equal, round, and reactive to light.   Neck:      Thyroid: No thyromegaly.      Vascular: No carotid bruit or JVD.   Cardiovascular:      Rate and Rhythm: Normal rate and regular rhythm.      Pulses:           Carotid pulses are 2+ on the right side and 2+ on the left side.       Femoral pulses are 2+ on the right side and 2+ on the left side.       Popliteal pulses are 2+ on the right side and 2+ on the left side.        Dorsalis pedis pulses are 2+ on the right side and 2+ on the left side.        Posterior tibial pulses are 2+ on the right side and 2+ on the left side.      Heart sounds: Normal heart sounds.   Pulmonary:      Effort: Pulmonary effort is normal.      Breath sounds: Normal breath sounds.   Abdominal:      General: Bowel sounds are normal. There is no distension or abdominal bruit.      Palpations: Abdomen is soft. There is no mass.      Tenderness: There is no abdominal tenderness.   Musculoskeletal:         General: Normal range of motion.      Cervical back: Neck supple.   Lymphadenopathy:      Cervical: No cervical adenopathy.   Skin:     General: Skin is warm and dry.   Neurological:      General: No focal deficit present.      Mental Status: She is alert and oriented to person, place, " and time.      Cranial Nerves: No cranial nerve deficit.      Sensory: No sensory deficit.   Psychiatric:         Mood and Affect: Mood normal.         Behavior: Behavior normal.         Thought Content: Thought content normal.         Judgment: Judgment normal.         Patient Active Problem List   Diagnosis   • Compression fracture of body of thoracic vertebra (CMS/[facility])   • Obesity (BMI 30-39.9)   • Former smoker   • Acute bilateral low back pain without sciatica   • CAD (coronary artery disease)   • Nocturnal hypoxemia   • Type 2 diabetes mellitus (CMS/[facility])   • Transient ischemic attack (TIA)   • Seizures (CMS/[facility])   • Cerebrovascular accident (CVA) (CMS/[facility])   • Hypertension   • Hyperlipidemia   • Diabetes mellitus (CMS/[facility])        Diagnosis Plan   1. PAD (peripheral artery disease) (CMS/[facility])     2. Bilateral carotid artery stenosis  US Carotid Bilateral   3. Essential hypertension     4. Hyperlipidemia, unspecified hyperlipidemia type     5. Type 2 diabetes mellitus without complication, without long-term current use of insulin (CMS/[facility])         Plan: After thoroughly evaluating Pascale Jimenez, I believe the best course of action is to remain conservative from a vascular surgery standpoint.  Currently, she appears to be doing quite well and denies any arterial claudication of the lower extremities.  Her screening test at home does not appear to be accurate.  She has palpable pedal pulses.  I will see her back in 1 years time with an JAY and a carotid duplex for vascular surveillance. I did discuss vascular risk factors as they pertain to the progression of vascular disease including controlling hypertension, hyperlipidemia, and type 2 diabetes mellitus.  These risk factors are currently controlled and stable. The patient is to continue taking their medications as previously discussed.   This was all discussed in full with complete understanding.  Thank you for allowing me to participate in the care of your  patient.  Please do not hesitate to call with any questions or concerns.  We will keep you aware of any further encounters with Pascale Jimenez.        Sincerely yours,         DO Tigre Diallo Jonathan E, MD

## 2021-10-20 ENCOUNTER — OFFICE VISIT (OUTPATIENT)
Dept: NEUROLOGY | Facility: CLINIC | Age: 76
End: 2021-10-20

## 2021-10-20 VITALS
HEART RATE: 88 BPM | BODY MASS INDEX: 31.86 KG/M2 | SYSTOLIC BLOOD PRESSURE: 144 MMHG | DIASTOLIC BLOOD PRESSURE: 72 MMHG | WEIGHT: 186.6 LBS | OXYGEN SATURATION: 97 % | HEIGHT: 64 IN

## 2021-10-20 DIAGNOSIS — R94.01 ABNORMAL EEG: ICD-10-CM

## 2021-10-20 PROCEDURE — 99214 OFFICE O/P EST MOD 30 MIN: CPT | Performed by: PSYCHIATRY & NEUROLOGY

## 2021-10-20 RX ORDER — FLUCONAZOLE 100 MG/1
100 TABLET ORAL DAILY
COMMUNITY
Start: 2021-10-13 | End: 2022-08-30

## 2021-10-20 RX ORDER — LEVETIRACETAM 500 MG/1
500 TABLET ORAL 2 TIMES DAILY
Qty: 180 TABLET | Refills: 1 | Status: SHIPPED | OUTPATIENT
Start: 2021-10-20 | End: 2022-07-26

## 2021-10-20 NOTE — PATIENT INSTRUCTIONS
Patient did get to emergency room immediately if stroke or seizure occurs.  Patient not to be driving until released.  Patient to continue seizure precautions and fall precautions

## 2021-10-20 NOTE — PROGRESS NOTES
Subjective   Pascale Jimenez, 1945, is a female who is being seen today for   Chief Complaint   Patient presents with   • Stroke     pt denies new stroke sx   • Seizures     pt denies new seizure activity   • Sleep Apnea     pt states she is compliant with her pap device       HISTORY OF PRESENT ILLNESS: Patient extended follow-up.  Patient seen for history of stroke and seizure and sleep apnea.  Patient has not had any stroke symptoms or seizures since last seen.  Patient continues on the Keppra 500 mg p.o. twice daily.  Patient is to get CBC/CMP and Keppra level.  Patient doing well with her SILVIA and use of nasal mask except for problems where she is having with lesions around perioral area which is being followed and treated by PCP.  Patient's [place] Sleepiness Scale line.  Patient STOP-BANG of 2.  Patient is compliant with excellent with AHI 0.7 minimal leak.  Patient is on AutoPap 8-16.  Patient continues Eliquis and aspirin and statin.  Patient denies any headaches or falls      REVIEW OF SYSTEMS:   GENERAL: Patient's blood pressure 152/76 left arm seated with pulse 79.  Standing blood pressure 144/72 pulse 88.  PULMONARY: As above  CVS: No acute chest pain or palpitation  GASTROINTESTINAL: No acute GI distress  GENITOURINARY: No acute  distress  GYN: No acute GYN symptoms  MUSCULOSKELETAL: No acute musculoskeletal change  HEENT: As above  ENDOCRINE: Diabetic  PSYCHIATRIC: No acute psychiatric symptoms  HEMATOLOGY: No recent blood work for review  SKIN: As above  Family history reviewed and otherwise noncontributory    Social history: Patient denies smoking or drug or alcohol use  PHYSICAL EXAMINATION:    GENERAL: No acute distress  CRANIUM: Normocephalic/atraumatic  HEENT:        EYES: Fields grossly full to confrontation however I discussed the results of the visual stein in June by Dr. Vásquez and he saw significant homonymous hemianopia by visual field exam which would restrict driving.  EOMs intact  without nystagmus.  No acute fundic abnormalities.  Pupils equal round reactive to light       EARS: Tympanic membranes normal hear tuning fork bilaterally       THROAT: As above       NECK: No bruits/no lymphadenopathy  CHEST: No acute cardiopulmonary abnormalities by auscultation  ABDOMEN: Nondistended  EXTREMITIES: Dorsalis pedis pulses symmetrical  NEURO: Patient alert follows commands without difficulty  SPEECH: Normal    CRANIAL NERVES: Motor/sensory about the face normal and symmetric    MOTOR STRENGTH: Motor strength upper and lower extremities normal  STATION AND GAIT: Gait is wide-based but no tendency to fall and Romberg negative  CEREBELLAR: Finger-nose and heel shin normal  SENSORY: Decreased pin and vibration distal to proximal lower extremities and ankles bilaterally but otherwise normal pattern vibration throughout  REFLEXES: Decreased reflexes throughout without clonus or Babinski      ASSESSMENT AND PLAN: Patient with history of CVA and seizures.  Patient to continue seizure precautions.  I spent 30 minutes with this patient with counseling and review of records and exam.  Patient to get with PCP about elevated BMI weight and diet control      Diagnoses and all orders for this visit:    1. Abnormal EEG        Dictated utilizing Dragon voice recognition software

## 2022-01-18 ENCOUNTER — TELEPHONE (OUTPATIENT)
Dept: NEUROLOGY | Facility: CLINIC | Age: 77
End: 2022-01-18

## 2022-01-18 NOTE — TELEPHONE ENCOUNTER
DR. SANDERS    [place]    SELF    [phone]    FOR OVER 6 MOMTHS HAS HAD EXTREMELY DRY LIPS AND OUTSIDE OF MOUTH - HAS BEEN TO DERMATOLOGIST AND HAS TRIED 6 DIFF TREATMENTS TO GET RID OF THIS BUT NOTHING HAS HELPED. HER DERMATOLOGIST WANTED TO ASK DR. SANDERS IF PATIENT'S KEPPRA COULD BE THE CAUSE OF THIS DRYNESS. PLEASE ADVISE

## 2022-01-19 ENCOUNTER — TELEPHONE (OUTPATIENT)
Dept: NEUROLOGY | Facility: CLINIC | Age: 77
End: 2022-01-19

## 2022-01-19 NOTE — TELEPHONE ENCOUNTER
----- Message from José Antonio Beach MD sent at 1/19/2022  2:11 PM CST -----  Keppra should not be causing the dryness.  It sometimes causes a skin rash but that should be over other parts of the body.  Rarely can cause throat irritation  ----- Message -----  From: Izabel Coates LPN  Sent: 1/19/2022  12:36 PM CST  To: José Antonio Beach MD    Received a message from the hub, Pascale said she has had extremely dry lips and the outside of her mouth.  Her dermatologist has been treating it, but nothing has helped.  Her dermatologist wanted her to ask if Keppra could cause this.

## 2022-01-20 ENCOUNTER — DOCUMENTATION (OUTPATIENT)
Dept: NEUROLOGY | Facility: CLINIC | Age: 77
End: 2022-01-20

## 2022-07-13 ENCOUNTER — OFFICE VISIT (OUTPATIENT)
Dept: NEUROSURGERY | Age: 77
End: 2022-07-13
Payer: COMMERCIAL

## 2022-07-13 VITALS
BODY MASS INDEX: 33.32 KG/M2 | OXYGEN SATURATION: 97 % | DIASTOLIC BLOOD PRESSURE: 74 MMHG | WEIGHT: 200 LBS | SYSTOLIC BLOOD PRESSURE: 124 MMHG | HEART RATE: 76 BPM | HEIGHT: 65 IN

## 2022-07-13 DIAGNOSIS — G40.909 SEIZURE DISORDER (HCC): ICD-10-CM

## 2022-07-13 DIAGNOSIS — I63.9 CEREBROVASCULAR ACCIDENT (CVA), UNSPECIFIED MECHANISM (HCC): Primary | ICD-10-CM

## 2022-07-13 DIAGNOSIS — R51.9 HEADACHE, UNSPECIFIED HEADACHE TYPE: ICD-10-CM

## 2022-07-13 PROCEDURE — 99204 OFFICE O/P NEW MOD 45 MIN: CPT | Performed by: PSYCHIATRY & NEUROLOGY

## 2022-07-13 PROCEDURE — 1123F ACP DISCUSS/DSCN MKR DOCD: CPT | Performed by: PSYCHIATRY & NEUROLOGY

## 2022-07-13 RX ORDER — APIXABAN 2.5 MG/1
1 TABLET, FILM COATED ORAL 2 TIMES DAILY
COMMUNITY
Start: 2022-07-07

## 2022-07-13 RX ORDER — OMEPRAZOLE 20 MG/1
20 CAPSULE, DELAYED RELEASE ORAL PRN
COMMUNITY

## 2022-07-13 RX ORDER — FLUCONAZOLE 100 MG/1
100 TABLET ORAL DAILY
COMMUNITY
Start: 2021-10-13 | End: 2022-07-13 | Stop reason: ALTCHOICE

## 2022-07-13 RX ORDER — HYDROCHLOROTHIAZIDE 12.5 MG/1
12.5 TABLET ORAL DAILY
COMMUNITY

## 2022-07-13 RX ORDER — BLOOD SUGAR DIAGNOSTIC
STRIP MISCELLANEOUS
COMMUNITY
Start: 2022-05-27

## 2022-07-13 RX ORDER — LOSARTAN POTASSIUM 100 MG/1
1 TABLET ORAL DAILY
COMMUNITY
Start: 2022-07-07

## 2022-07-13 RX ORDER — ASPIRIN 81 MG/1
81 TABLET ORAL DAILY
COMMUNITY

## 2022-07-13 RX ORDER — METRONIDAZOLE 375 MG/1
1 CAPSULE ORAL 2 TIMES DAILY
COMMUNITY
Start: 2022-07-10 | End: 2022-07-13 | Stop reason: ALTCHOICE

## 2022-07-13 RX ORDER — LEVETIRACETAM 500 MG/1
1 TABLET ORAL 2 TIMES DAILY
COMMUNITY
Start: 2022-05-23 | End: 2022-10-15 | Stop reason: SDUPTHER

## 2022-07-13 RX ORDER — AMLODIPINE BESYLATE 5 MG/1
1 TABLET ORAL DAILY
COMMUNITY
Start: 2022-05-20

## 2022-07-13 RX ORDER — ROSUVASTATIN CALCIUM 20 MG/1
1 TABLET, COATED ORAL DAILY
COMMUNITY
Start: 2022-07-07

## 2022-07-13 NOTE — PROGRESS NOTES
[facility] Neurology Office Note      Patient: Maddie Gonzalez  MR#:    [id number]  Account Number:                         YOB: 1945  Date of Evaluation:  7/13/2022  Time of Note:                          9:55 AM  Primary/Referring Physician:  Cheng Alfaro MD   Consulting Physician:  Devang Joy DO    NEW PATIENT CONSULTATION    Chief Complaint   Patient presents with    New Patient     Referral for a stroke    Seizures     No seizures to report     Headache     Has lightening strike pains in her head that can last for days        HISTORY OF PRESENT ILLNESS    Maddie Gonzalez is a 69y.o. year old female here for stroke, headaches, possible seizure. Back in November of 2020 patient suffered a stroke, right PCA distribution stroke per MRI. CTA with PCA stenosis, possible thrombus noted. CTA neck negative. ECHO with EF 56-60%, moderate LVH, diastolic dysfunction. EEG normal in the hospital.  Reports of another EEG with right temporal sharp waves. Holter with PAT, PVC's, SR. MRI repeated in April with possible cortical dysplasia in the right temporal lobe, old right occipital ischemic changes, nothing acute. No abnormal enhancement. PSG with ROBBI, PLM. She is on Eliquis currently. ESR normal.  CRP slightly elevated. From a seizure standpoint, she overtly denies an overt GTC event or ED, no staring episodes. She denies any prior seizure like activity. She is on Keppra currently. She did have some visual field loss due to her prior stroke. No facial drooping, slurred speech, focal weakness or numbness. Patient is currently not having any overt new symptoms or neurologic complaints. Headaches overall are largely improved but are occurring intermittently, noting neuralgia, that are typical quick, shock-like pain, occurring sporadically. She describes them as a \"flash\" and will come and go but may only occur a few times a month.   Location can vary, no facial involvement, no clear triggers. This has been present for years, even prior to the stroke. Ophthalmology has been following visual fields. Past Medical History:   Diagnosis Date    Chronic GERD     CVA [facility]cerebral vascular accident) ([facility] [address]     Diverticulitis     Hypertension     Seizures ([facility] [address]        Past Surgical History:   Procedure Laterality Date    APPENDECTOMY      BACK SURGERY      L T     GALLBLADDER SURGERY      TOTAL KNEE ARTHROPLASTY Bilateral     TUBAL LIGATION         Family History   Problem Relation Age of Onset    Alzheimer's Disease Sister        Social History     Socioeconomic History    Marital status:      Spouse name: Not on file    Number of children: Not on file    Years of education: Not on file    Highest education level: Not on file   Occupational History    Not on file   Tobacco Use    Smoking status: Former Smoker    Smokeless tobacco: Never Used   Vaping Use    Vaping Use: Never used   Substance and Sexual Activity    Alcohol use: Never    Drug use: Never    Sexual activity: Not Currently   Other Topics Concern    Not on file   Social History Narrative    Not on file     Social Determinants of Health     Financial Resource Strain:     Difficulty of Paying Living Expenses: Not on file   Food Insecurity:     Worried About [address] in the Last Year: Not on file    [address] in the Last Year: Not on file   Transportation Needs:     Lack of Transportation (Medical): Not on file    Lack of Transportation (Non-Medical):  Not on file   Physical Activity:     Days of Exercise per Week: Not on file    Minutes of Exercise per Session: Not on file   Stress:     Feeling of Stress : Not on file   Social Connections:     Frequency of Communication with Friends and Family: Not on file    Frequency of Social Gatherings with Friends and Family: Not on file    Attends Scientologist Services: Not on file    Active Member of Clubs or Organizations: Not on file    Attends Club or Organization Meetings: Not on file    Marital Status: Not on file   Intimate Partner Violence:     Fear of Current or Ex-Partner: Not on file    Emotionally Abused: Not on file    Physically Abused: Not on file    Sexually Abused: Not on file   Housing Stability:     Unable to Pay for Housing in the Last Year: Not on file    Number of Ana María in the Last Year: Not on file    Unstable Housing in the Last Year: Not on file       Current Outpatient Medications   Medication Sig Dispense Refill    amLODIPine (NORVASC) 5 MG tablet Take 1 tablet by mouth daily      ELIQUIS 2.5 MG TABS tablet Take 1 tablet by mouth 2 times daily      aspirin 81 MG EC tablet Take 81 mg by mouth daily      ONETOUCH ULTRA strip AS DIRECTED ONCE DAILY      hydroCHLOROthiazide (HYDRODIURIL) 12.5 MG tablet Take 12.5 mg by mouth daily      levETIRAcetam (KEPPRA) 500 MG tablet Take 1 tablet by mouth 2 times daily      losartan (COZAAR) 100 MG tablet Take 1 tablet by mouth daily      omeprazole (PRILOSEC) 20 MG delayed release capsule Take 20 mg by mouth as needed      rosuvastatin (CRESTOR) 20 MG tablet Take 1 tablet by mouth daily       No current facility-administered medications for this visit.        Allergies   Allergen Reactions    Meperidine Nausea And Vomiting    Morphine And Related Nausea And Vomiting and Other (See Comments)     Pt does not recall      Pentazocine      Other reaction(s): Mental Status Change    Adhesive Tape Rash    Prednisone Other (See Comments)     Pt does not recall         REVIEW OF SYSTEMS    Constitutional: []Fever []Sweat []Chills [] Recent Injury [x] Denies all unless marked  HEENT:[x]Headache  [] Head Injury/Hearing Loss  [] Sore Throat  [] Ear Ache/Dizziness  [x] Denies all unless marked  Spine:  [] Neck pain  [x] Back pain  [] Sciaticia  [x] Denies all unless marked  Cardiovascular:[]Heart Disease []Chest Pain [] Palpitations  [x] Denies all unless marked  Pulmonary: []Shortness of Breath []Cough   [x] Denies all unless marke  Gastrointestinal: []Nausea  []Vomiting  []Abdominal Pain  []Constipation  []Diarrhea  []Dark Bloody Stools  [x] Denies all unless marked  Psychiatric/Behavioral:[] Depression [] Anxiety [x] Denies all unless marked  Genitourinary:   [] Frequency  [] Urgency  [] Incontinence [] Pain with Urination  [x] Denies all unless marked  Extremities: [x]Pain  []Swelling  [x] Denies all unless marked  Musculoskeletal: [] Muscle Pain  [x] Joint Pain  [x] Arthritis [] Muscle Cramps [] Muscle Twitches  [x] Denies all unless marked  Sleep: [] Insomnia [] Snoring [] Restless Legs [] Sleep Apnea  [] Daytime Sleepiness  [x] Denies all unless marked  Skin:[] Rash [] Skin Discoloration [x] Denies all unless marked   Neurological: []Visual Disturbance/Memory Loss [] Loss of Balance [] Slurred Speech/Weakness [] Seizures  [] Vertigo/Dizziness [x] Denies all unless marked     I have reviewed the above ROS with the patient and agree with the ROS as documented above. PHYSICAL EXAM    Constitutional -   /74   Pulse 76   Ht 5' 4.5\" (1.638 m)   Wt 200 lb (90.7 kg)   SpO2 97%   BMI 33.80 kg/m²   General appearance: No acute distress   EYES -   Conjunctiva normal  Pupillary exam as below, see CN exam in the neurologic exam  ENT-    No scars, masses, or lesions over external nose or ears  Hearing normal bilaterally to finger rub  Cardiovascular -   No clubbing, cyanosis, or edema   Pulmonary-   Good expansion, normal effort without use of accessory muscles  Musculoskeletal -   No significant wasting of muscles noted  Gait as below, see gait exam in the neurologic exam  Muscle strength, tone, stability as below. No bony deformities  Skin -   Warm, dry, and intact to inspection and palpation.     No rash, erythema, or pallor  Psychiatric -   Mood, affect, and behavior appear normal    Memory as below see mental status examination in the neurologic exam    NEUROLOGICAL EXAM    Mental status   [x]Awake, alert, oriented   [x]Affect attention and concentration appear appropriate  [x]Recent and remote memory appears unremarkable  [x]Speech normal without dysarthria or aphasia, comprehension and repetition intact. COMMENTS:    Cranial Nerves [x]No VF deficit to confrontation  [x]PERRLA, EOMI, no nystagmus, conjugate eye movements, no ptosis  [x]Face symmetric  [x]Facial sensation intact  [x]Tongue midline no atrophy or fasciculations present  [x]Palate midline, hearing to finger rub normal bilaterally  [x]Shoulder shrug and SCM testing normal bilaterally  COMMENTS:   Motor   [x]5/5 strength x 4 extremities  [x]Normal bulk and tone  [x]No tremor present  [x]No rigidity or bradykinesia noted  COMMENTS:   Sensory  [x]Sensation intact to light touch, pin prick, vibration, and proprioception BLE  []Sensation intact to light touch, pin prick, vibration, and proprioception BUE  COMMENTS:   Coordination [x]FTN normal bilaterally   []HTS normal bilaterally  []MUKUND normal bilaterally.    COMMENTS:   Reflexes  [x]Symmetric and non-pathological  [x]Toes down going bilaterally  [x]No clonus present  COMMENTS:   Gait                  [x]Normal steady gait    []Ataxic    []Spastic     []Magnetic     []Shuffling  COMMENTS:       LABS RECORD AND IMAGING REVIEW (As below and per HPI)      Lab Results   Component Value Date    WBC 11.17 (H) 11/18/2012    HGB 9.6 (L) 11/19/2012    HCT 28.9 (L) 11/19/2012    MCV 91.7 11/18/2012     11/18/2012     Lab Results   Component Value Date     11/19/2012    K 3.1 (L) 11/19/2012     11/19/2012    CO2 34 (H) 11/19/2012    BUN 10 11/19/2012    CREATININE 0.9 09/16/2014    GLUCOSE 116 11/19/2012    CALCIUM 8.2 (L) 11/19/2012    PROT 7.3 10/22/2012    LABALBU 4.4 10/22/2012    ALKPHOS 88 10/22/2012    AST 19 10/22/2012    ALT 22 10/22/2012    LABGLOM 131 11/19/2012                                ASSESSMENT:    Zonia Apple is a 69y.o. 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 Stroke workup completed at [facility], ECHO negative from a stroke standpoint. CTA head with PCA stenosis, possible small thrombus. CTA neck negative. Holter with PAT, but no clear a fib. Currently on Eliquis and ASA. On Keppra. Visual field abnormalities noted previously fter stroke, seen ophthalmology. No overt VF testing abnormalities noted today. No seizures noted. No new stroke symptoms. Headaches / atypical neuralgia is a chronic issue and only occurring sporadically. PLAN:  1. Continue Eliquis given abnormal Holter, continue ASA, statin. Risk factor maximization through primary. 2.  Continue Keppra given possible prior abnormal EEG and temporal cortical dysplasia. 3.  Follow up with ophthalmology, driving ok from my standpoint if cleared by ophthalmology, no overt VF abnormalities on my exam today. No seizures noted on Keppra. 4.  Epilepsy precautions and seizure first aid discussed. Driving per [employer], no heights, swimming, tub baths, open flames, or heavy machinery.         Gigi Alejandra, DO  Board Certified Neurology

## 2022-07-24 DIAGNOSIS — R94.01 ABNORMAL EEG: ICD-10-CM

## 2022-07-26 RX ORDER — LEVETIRACETAM 500 MG/1
TABLET ORAL
Qty: 180 TABLET | Refills: 0 | Status: SHIPPED | OUTPATIENT
Start: 2022-07-26

## 2022-08-29 ENCOUNTER — TELEPHONE (OUTPATIENT)
Dept: VASCULAR SURGERY | Facility: CLINIC | Age: 77
End: 2022-08-29

## 2022-08-30 ENCOUNTER — OFFICE VISIT (OUTPATIENT)
Dept: VASCULAR SURGERY | Facility: CLINIC | Age: 77
End: 2022-08-30

## 2022-08-30 ENCOUNTER — HOSPITAL ENCOUNTER (OUTPATIENT)
Dept: ULTRASOUND IMAGING | Facility: HOSPITAL | Age: 77
Discharge: HOME | End: 2022-08-30

## 2022-08-30 VITALS
BODY MASS INDEX: 33.92 KG/M2 | DIASTOLIC BLOOD PRESSURE: 70 MMHG | SYSTOLIC BLOOD PRESSURE: 130 MMHG | HEIGHT: 65 IN | WEIGHT: 203.6 LBS | HEART RATE: 60 BPM | OXYGEN SATURATION: 97 %

## 2022-08-30 DIAGNOSIS — I10 ESSENTIAL HYPERTENSION: ICD-10-CM

## 2022-08-30 DIAGNOSIS — I65.23 BILATERAL CAROTID ARTERY STENOSIS: ICD-10-CM

## 2022-08-30 DIAGNOSIS — E11.9 TYPE 2 DIABETES MELLITUS WITHOUT COMPLICATION, WITHOUT LONG-TERM CURRENT USE OF INSULIN: ICD-10-CM

## 2022-08-30 DIAGNOSIS — I73.9 PAD (PERIPHERAL ARTERY DISEASE): ICD-10-CM

## 2022-08-30 DIAGNOSIS — I65.23 BILATERAL CAROTID ARTERY STENOSIS: Primary | ICD-10-CM

## 2022-08-30 DIAGNOSIS — E78.5 HYPERLIPIDEMIA, UNSPECIFIED HYPERLIPIDEMIA TYPE: ICD-10-CM

## 2022-08-30 PROCEDURE — 93923 UPR/LXTR ART STDY 3+ LVLS: CPT

## 2022-08-30 PROCEDURE — 93880 EXTRACRANIAL BILAT STUDY: CPT | Performed by: SURGERY

## 2022-08-30 PROCEDURE — 99214 OFFICE O/P EST MOD 30 MIN: CPT | Performed by: NURSE PRACTITIONER

## 2022-08-30 PROCEDURE — 93923 UPR/LXTR ART STDY 3+ LVLS: CPT | Performed by: SURGERY

## 2022-08-30 PROCEDURE — 93880 EXTRACRANIAL BILAT STUDY: CPT

## 2022-08-30 RX ORDER — LANCETS 33 GAUGE
EACH MISCELLANEOUS AS NEEDED
COMMUNITY
Start: 2022-07-16

## 2022-08-30 RX ORDER — BLOOD SUGAR DIAGNOSTIC
1 STRIP MISCELLANEOUS DAILY
COMMUNITY
Start: 2022-05-27

## 2022-08-30 RX ORDER — AMLODIPINE BESYLATE 5 MG/1
1 TABLET ORAL DAILY
COMMUNITY
Start: 2022-07-22

## 2022-08-30 NOTE — PROGRESS NOTES
"8/30/2022       Danielle Lerma, APRN  [address] [place] [id number]    Pascale Jimenez  1945    Chief Complaint   Patient presents with   • Carotid Artery Disease     1 year follow up with US. Patient denies stroke like symptoms    • Peripheral Vascular Disease     1 year follow up w/ US       Dear Danielle Lerma, APRN   HPI  I had the pleasure of seeing your patient Pascale Jimenez in the office today. As you recall, Pascale Jimenez is a 77 y.o.  female who you are currently following for routine health maintenance.  She was initially sent for screening testing done from her insurance company showing moderate disease.  She is doing well and denies any claudication to her lower extremities.  She also denies any strokelike symptoms.  She is maintained on aspirin, Eliquis, and Crestor.  She has a history of stroke in 2020.  She did have noninvasive testing for today, which I did review in office.        Review of Systems   Constitutional: Negative.    HENT: Negative.    Eyes: Negative.    Respiratory: Negative.    Cardiovascular: Negative.    Gastrointestinal: Negative.    Endocrine: Negative.    Genitourinary: Negative.    Musculoskeletal: Negative.    Skin: Negative.    Allergic/Immunologic: Negative.    Neurological: Negative.    Hematological: Negative.    Psychiatric/Behavioral: Negative.    All other systems reviewed and are negative.    /70   Pulse 60   Ht 163.8 cm (64.5\")   Wt 92.4 kg (203 lb 9.6 oz)   SpO2 97%   BMI 34.41 kg/m²     Physical Exam  Vitals and nursing note reviewed.   Constitutional:       Appearance: Normal appearance. She is well-developed.   HENT:      Head: Normocephalic and atraumatic.   Eyes:      General: No scleral icterus.     Pupils: Pupils are equal, round, and reactive to light.   Neck:      Thyroid: No thyromegaly.      Vascular: No carotid bruit or JVD.   Cardiovascular:      Rate and Rhythm: Normal rate and regular rhythm.      Pulses:           Carotid " pulses are 2+ on the right side and 2+ on the left side.       Femoral pulses are 2+ on the right side and 2+ on the left side.       Popliteal pulses are 2+ on the right side and 2+ on the left side.        Dorsalis pedis pulses are 2+ on the right side and 2+ on the left side.        Posterior tibial pulses are 2+ on the right side and 2+ on the left side.      Heart sounds: Normal heart sounds.   Pulmonary:      Effort: Pulmonary effort is normal.      Breath sounds: Normal breath sounds.   Abdominal:      General: Bowel sounds are normal. There is no distension or abdominal bruit.      Palpations: Abdomen is soft. There is no mass.      Tenderness: There is no abdominal tenderness.   Musculoskeletal:         General: Normal range of motion.      Cervical back: Neck supple.   Lymphadenopathy:      Cervical: No cervical adenopathy.   Skin:     General: Skin is warm and dry.   Neurological:      General: No focal deficit present.      Mental Status: She is alert and oriented to person, place, and time.      Cranial Nerves: No cranial nerve deficit.      Sensory: No sensory deficit.   Psychiatric:         Mood and Affect: Mood normal.         Behavior: Behavior normal.         Thought Content: Thought content normal.         Judgment: Judgment normal.       Diagnostic data:  Noninvasive test including ABIs show no arterial occlusion today to her lower extremities.  Carotid duplex shows 50 to 69% right carotid stenosis and less than 50% left carotid stenosis bilaterally degree vertebral flow.    Patient Active Problem List   Diagnosis   • Compression fracture of body of thoracic vertebra (HCC)   • Obesity (BMI 30-39.9)   • Former smoker   • Acute bilateral low back pain without sciatica   • CAD (coronary artery disease)   • Nocturnal hypoxemia   • Type 2 diabetes mellitus ([facility])   • Transient ischemic attack (TIA)   • Seizures ([facility])   • Cerebrovascular accident (CVA) ([facility])   • Hypertension   • Hyperlipidemia   •  Diabetes mellitus ([facility])        Diagnosis Plan   1. Bilateral carotid artery stenosis  US Carotid Bilateral   2. PAD (peripheral artery disease) ([facility])  US Ankle / Brachial Indices Extremity Complete   3. Essential hypertension     4. Hyperlipidemia, unspecified hyperlipidemia type     5. Type 2 diabetes mellitus without complication, without long-term current use of insulin ([facility])         Plan: After thoroughly evaluating Pascale Jimenez, I believe echo sections remain conservative from vascular surgery standpoint.  Currently she is doing well denies any claudication to her lower extremities.  She also denies any strokelike symptoms.  I did review her testing which shows no arterial deficiency to her lower extremities.  Her carotid duplex shows 50 to 69% right carotid stenosis and less than 50% left carotid stenosis.  Her feet are nice and warm with palpable pedal pulses.  We will see her back in 1 year with repeat noninvasive testing for continued surveillance, including ABIs and carotid duplex.  I did discuss vascular risk factors as pertain to the progression of vascular disease including controlling her hypertension, hyperlipidemia, and diabetes.  Her blood pressure stable on her current medications.  She is maintained on Crestor for hyperlipidemia.  The patient is to continue taking their medications as previously discussed.   This was all discussed in full with complete understanding.  Thank you for allowing me to participate in the care of your patient.  Please do not hesitate to call with any questions or concerns.  We will keep you aware of any further encounters with Pascale Jimenez.        Sincerely yours,         JUAN Hassan Amanda D, APRN

## 2022-10-14 ENCOUNTER — TELEPHONE (OUTPATIENT)
Dept: NEUROLOGY | Age: 77
End: 2022-10-14

## 2022-10-14 DIAGNOSIS — R94.01 ABNORMAL EEG: ICD-10-CM

## 2022-10-14 NOTE — TELEPHONE ENCOUNTER
Pascale said she is going to continue to see Dr. Sherman.  Explained that I will contact their office and ask if they will prescribe Keppra for her.

## 2022-10-14 NOTE — TELEPHONE ENCOUNTER
Vincent from Dr. Malaika Garcia office called stating that they're requesting Roberto Masters management of patients keppra and requesting script to [facility]

## 2022-10-15 RX ORDER — LEVETIRACETAM 500 MG/1
500 TABLET ORAL 2 TIMES DAILY
Qty: 180 TABLET | Refills: 3 | Status: SHIPPED | OUTPATIENT
Start: 2022-10-15

## 2022-10-17 RX ORDER — LEVETIRACETAM 500 MG/1
TABLET ORAL
Qty: 180 TABLET | Refills: 3 | OUTPATIENT
Start: 2022-10-17

## 2022-12-28 ENCOUNTER — TELEPHONE (OUTPATIENT)
Dept: NEUROLOGY | Age: 77
End: 2022-12-28

## 2022-12-28 NOTE — TELEPHONE ENCOUNTER
v/m for pt to call to r/s appt for 1/16/2022 with Ivette Del Rio provider will be out of office . please schedule next available appt and add to wail list if needed 2/28 mbdaisha

## 2023-02-03 NOTE — PROGRESS NOTES
Subjective    Ms. Jimenez is 78 y.o. female    Chief Complaint: Cyst of kidney     History of Present Illness  78-year-old female new patient referred for asymptomatic left-sided parapelvic renal cysts identified on noncontrast CT abdomen pelvis done at [facility].  I reviewed her imaging today and discussed with the patient showing grossly normal noncontrast appearance of kidneys bilaterally without stone or hydronephrosis.  There is no evidence for solid mass at this time.  She denies hematuria, dysuria, or flank pain.    I independently visualized and reviewed the patient's prior imaging studies today in clinic and discussed the imaging findings with the patient.      The following portions of the patient's history were reviewed and updated as appropriate: allergies, current medications, past family history, past medical history, past social history, past surgical history and problem list.    Review of Systems      Current Outpatient Medications:   •  amLODIPine (NORVASC) 5 MG tablet, Take 1 tablet by mouth Daily., Disp: , Rfl:   •  apixaban (ELIQUIS) 2.5 MG tablet tablet, Take 1 tablet by mouth Every 12 (Twelve) Hours. Indications: ESUS, Disp: 60 tablet, Rfl: 0  •  aspirin 81 MG EC tablet, Take 81 mg by mouth Daily., Disp: , Rfl:   •  fluticasone (FLONASE) 50 MCG/ACT nasal spray, USE 1 SPRAY IN THE NOSTRILS BID, Disp: , Rfl: 3  •  glucose blood test strip, 1 each by Other route Daily., Disp: , Rfl:   •  hydroCHLOROthiazide (HYDRODIURIL) 12.5 MG tablet, Take 12.5 mg by mouth Daily., Disp: , Rfl:   •  Lancets (OneTouch Delica Plus [id number]) misc, As Needed., Disp: , Rfl:   •  levETIRAcetam (KEPPRA) 500 MG tablet, TAKE 1 TABLET BY MOUTH  TWICE DAILY, Disp: 180 tablet, Rfl: 0  •  losartan (COZAAR) 100 MG tablet, Take 100 mg by mouth Daily., Disp: , Rfl:   •  omeprazole (priLOSEC) 20 MG capsule, Take 20 mg by mouth As Needed., Disp: , Rfl:   •  OneTouch Ultra test strip, 1 each by Other route Daily., Disp: , Rfl:   •   "rosuvastatin (CRESTOR) 20 MG tablet, Take 20 mg by mouth Daily., Disp: , Rfl:     Past Medical History:   Diagnosis Date   • Colon polyp    • Diabetes mellitus (HCC)    • Diverticulitis    • Duodenal ulcer    • GERD (gastroesophageal reflux disease)    • Hemorrhoids    • Histoplasmosis    • Hyperlipidemia    • Hypertension        Past Surgical History:   Procedure Laterality Date   • APPENDECTOMY     • CHOLECYSTECTOMY     • COLONOSCOPY  2014   • ENDOSCOPY  2015   • HYSTERECTOMY     • REPLACEMENT TOTAL KNEE     • SPINE SURGERY     • TOTAL HIP ARTHROPLASTY Bilateral     x 2   • TUBAL ABDOMINAL LIGATION         Social History     Socioeconomic History   • Marital status:    Tobacco Use   • Smoking status: Former     Packs/day: 0.50     Years: 3.00     Pack years: 1.50     Types: Cigarettes     Start date:      Quit date:      Years since quittin.1   • Smokeless tobacco: Former     Quit date:    Substance and Sexual Activity   • Alcohol use: No   • Drug use: No   • Sexual activity: Defer       Family History   Problem Relation Age of Onset   • Heart disease Mother    • Heart disease Father    • Cancer Father    • Colon cancer Neg Hx    • Colon polyps Neg Hx        Objective    Temp 97.8 °F (36.6 °C)   Ht 162.6 cm (64\")   Wt 92.1 kg (203 lb)   BMI 34.84 kg/m²     Physical Exam        Results for orders placed or performed during the hospital encounter of 21   POC Creatinine    Specimen: Blood   Result Value Ref Range    Creatinine 0.60 0.60 - 1.30 mg/dL     Assessment and Plan    Diagnoses and all orders for this visit:    1. Cyst of kidney, acquired (Primary)  -     Cancel: POC Urinalysis Dipstick, Multipro      Patient was reassured regarding the benign nature of parapelvic renal cyst for which no further evaluation or radiographic follow-up is indicated.  She will follow-up on an as-needed basis      This document has been signed by CARI Bangura MD on 2023 " 17:51 CST

## 2023-02-06 ENCOUNTER — OFFICE VISIT (OUTPATIENT)
Dept: UROLOGY | Facility: CLINIC | Age: 78
End: 2023-02-06
Payer: MEDICARE

## 2023-02-06 VITALS — WEIGHT: 203 LBS | TEMPERATURE: 97.8 F | BODY MASS INDEX: 34.66 KG/M2 | HEIGHT: 64 IN

## 2023-02-06 DIAGNOSIS — N28.1 CYST OF KIDNEY, ACQUIRED: Primary | ICD-10-CM

## 2023-02-06 PROCEDURE — 99202 OFFICE O/P NEW SF 15 MIN: CPT | Performed by: UROLOGY

## 2023-02-06 PROCEDURE — 81003 URINALYSIS AUTO W/O SCOPE: CPT | Performed by: UROLOGY

## 2023-05-01 ENCOUNTER — OFFICE VISIT (OUTPATIENT)
Dept: NEUROLOGY | Age: 78
End: 2023-05-01

## 2023-05-01 VITALS
DIASTOLIC BLOOD PRESSURE: 74 MMHG | SYSTOLIC BLOOD PRESSURE: 126 MMHG | WEIGHT: 200 LBS | BODY MASS INDEX: 33.32 KG/M2 | HEIGHT: 65 IN | HEART RATE: 74 BPM | OXYGEN SATURATION: 98 %

## 2023-05-01 DIAGNOSIS — I63.9 CEREBROVASCULAR ACCIDENT (CVA), UNSPECIFIED MECHANISM (HCC): ICD-10-CM

## 2023-05-01 DIAGNOSIS — G40.909 SEIZURE DISORDER (HCC): Primary | ICD-10-CM

## 2023-05-01 DIAGNOSIS — R51.9 HEADACHE, UNSPECIFIED HEADACHE TYPE: ICD-10-CM

## 2023-05-01 PROCEDURE — 1123F ACP DISCUSS/DSCN MKR DOCD: CPT | Performed by: PSYCHIATRY & NEUROLOGY

## 2023-05-01 PROCEDURE — 99214 OFFICE O/P EST MOD 30 MIN: CPT | Performed by: PSYCHIATRY & NEUROLOGY

## 2023-05-01 RX ORDER — LEVETIRACETAM 500 MG/1
500 TABLET ORAL 2 TIMES DAILY
Qty: 180 TABLET | Refills: 3 | Status: SHIPPED | OUTPATIENT
Start: 2023-05-01

## 2023-05-01 RX ORDER — FLUTICASONE PROPIONATE 50 MCG
1 SPRAY, SUSPENSION (ML) NASAL DAILY
COMMUNITY

## 2023-05-01 NOTE — PROGRESS NOTES
[facility] Neurology Office Note      Patient: Kalee Aparicio  MR#:    [id number]  Account Number:                         YOB: 1945  Date of Evaluation:  5/1/2023  Time of Note:                          11:34 AM  Primary/Referring Physician:  Tory Bray MD   Consulting Physician:  Rosio Mason DO    FOLLOW UP VISIT    Chief Complaint   Patient presents with    6 Month Follow-Up    Fall     Patient fell today getting into friends car today     Seizures     No seizures to report        HISTORY OF PRESENT ILLNESS    Kalee Aparicio is a 69y.o. year old female here for stroke, headaches, possible seizure. Back in November of 2020 patient suffered a stroke, right PCA distribution stroke per MRI. CTA with PCA stenosis, possible thrombus noted. CTA neck negative. ECHO with EF 56-60%, moderate LVH, diastolic dysfunction. EEG normal in the hospital.  Reports of another EEG with right temporal sharp waves. Holter with PAT, PVC's, SR. 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 No abnormal enhancement. PSG with ROBBI, PLM. She is on Eliquis currently. ESR normal.  CRP slightly elevated. From a seizure standpoint, she overtly denies an overt GTC event or ED, no staring episodes. No events since last seen and denies any prior seizure like activity. She is on Keppra currently. She did have some visual field loss due to her prior stroke. No facial drooping, slurred speech, focal weakness or numbness. Patient is currently not having any overt new symptoms or neurologic complaints. Headaches overall are largely improved but are occurring intermittently, noting neuralgia, that are typical quick, shock-like pain, occurring sporadically. She describes them as a \"flash\" and will come and go but may only occur a few times a month. Location can vary, no facial involvement, no clear triggers.  This has

## 2023-08-11 ENCOUNTER — TELEPHONE (OUTPATIENT)
Dept: VASCULAR SURGERY | Facility: CLINIC | Age: 78
End: 2023-08-11
Payer: MEDICARE

## 2023-08-14 ENCOUNTER — APPOINTMENT (OUTPATIENT)
Dept: ULTRASOUND IMAGING | Facility: HOSPITAL | Age: 78
End: 2023-08-14

## 2023-08-14 ENCOUNTER — HOSPITAL ENCOUNTER (OUTPATIENT)
Dept: ULTRASOUND IMAGING | Facility: HOSPITAL | Age: 78
Discharge: HOME | End: 2023-08-14
Payer: MEDICARE

## 2023-09-01 ENCOUNTER — TELEPHONE (OUTPATIENT)
Dept: NEUROLOGY | Age: 78
End: 2023-09-01

## 2023-09-01 NOTE — TELEPHONE ENCOUNTER
Patient  Heide Houston  called to schedule a  and ER follow up from [facility] on 8/31/23 for Heide Houston. [facility] was unable to accommodate in time frame needed. Please return Patient call with patients appointment at [facility]. Thank you. Judge Mcgowan called  and stated she need to see Dr. Gautam Peter for a follow up on her dizzieness/drunk feeling. Thank you.

## 2023-09-05 ENCOUNTER — TELEPHONE (OUTPATIENT)
Dept: NEUROSURGERY | Age: 78
End: 2023-09-05

## 2023-09-05 NOTE — TELEPHONE ENCOUNTER
Patient called requesting an appointment. Patient has appointment on 11/2023. Patient requesting a call back.

## 2023-09-08 ENCOUNTER — TELEPHONE (OUTPATIENT)
Dept: VASCULAR SURGERY | Facility: CLINIC | Age: 78
End: 2023-09-08
Payer: MEDICARE

## 2024-02-06 ENCOUNTER — OFFICE VISIT (OUTPATIENT)
Dept: NEUROLOGY | Age: 79
End: 2024-02-06
Payer: COMMERCIAL

## 2024-02-06 VITALS
HEART RATE: 76 BPM | HEIGHT: 65 IN | OXYGEN SATURATION: 99 % | BODY MASS INDEX: 33.32 KG/M2 | DIASTOLIC BLOOD PRESSURE: 76 MMHG | SYSTOLIC BLOOD PRESSURE: 124 MMHG | WEIGHT: 200 LBS

## 2024-02-06 DIAGNOSIS — R51.9 HEADACHE, UNSPECIFIED HEADACHE TYPE: ICD-10-CM

## 2024-02-06 DIAGNOSIS — G40.909 SEIZURE DISORDER (HCC): Primary | ICD-10-CM

## 2024-02-06 DIAGNOSIS — I63.9 CEREBROVASCULAR ACCIDENT (CVA), UNSPECIFIED MECHANISM (HCC): ICD-10-CM

## 2024-02-06 PROCEDURE — 1123F ACP DISCUSS/DSCN MKR DOCD: CPT | Performed by: PSYCHIATRY & NEUROLOGY

## 2024-02-06 PROCEDURE — 99214 OFFICE O/P EST MOD 30 MIN: CPT | Performed by: PSYCHIATRY & NEUROLOGY

## 2024-02-06 NOTE — PROGRESS NOTES
[facility] Office Note      Patient:   Michelle Al  MR#:    [id number]  Account Number:                         YOB: 1945  Date of Evaluation:  2/6/2024  Time of Note:                          11:12 AM  Primary/Referring Physician:  Gonzalo Rodriguez MD   Consulting Physician:  Sanjay Moreira,     FOLLOW UP VISIT    Chief Complaint   Patient presents with    6 Month Follow-Up    Seizures     No seizures to report        HISTORY OF PRESENT ILLNESS    Michelle Al is a 79 y.o. year old female here for stroke, headaches, possible seizure.  Back in November of 2020 patient suffered a stroke, right PCA distribution stroke per MRI.  CTA with PCA stenosis, possible thrombus noted.  CTA neck negative.   ECHO with EF 56-60%, moderate LVH, diastolic dysfunction.  EEG normal in the hospital.  Reports of another EEG with right temporal sharp waves.  Holter with PAT, PVC's, SR.  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 No abnormal enhancement.  PSG with ROBBI, PLM.  She is on Eliquis currently.  ESR normal.  CRP slightly elevated.  From a seizure standpoint, she overtly denies an overt GTC event or ED, no staring episodes.  No events since last seen, remains improved, and denies any prior seizure like activity.  She is on Keppra currently.  She did have some visual field loss due to her prior stroke.  No facial drooping, slurred speech, focal weakness or numbness.  Patient is currently not having any overt new symptoms or neurologic complaints.  Headaches overall are largely improved but are occurring intermittently, noting neuralgia, that are typical quick, shock-like pain, occurring sporadically.  She describes them as a \"flash\" and will come and go but may only occur a few times a month.  Location can vary, no facial involvement, no clear triggers. This has been present for years, even prior to the

## 2024-03-04 NOTE — TELEPHONE ENCOUNTER
Ok    [de-identified] : Extensive discussion of the options was had with the patient. This discussion included both surgical and nonsurgical options. Options including but not limited to cortisone injection, viscosupplementation, physical therapy, oral anti-inflammatories, MRI, arthroplasty VS arthroscopy were discussed with the patient. We also discussed the option of observation, allowing the patient to continue rest, ice, NSAIDs and following up if the condition does not improve. Time was taken to go over any questions the patient had in regards to any of the treatment plans described. She will get the IA injection into the right hip under fluoro. She will wait 1 month after the injection to f/u and then we will discuss injecting the knee at that time.   The following information has been documented by Coty Owusu acting as a scribe. Dr. Guillen Attestation The documentation recorded by the scribe, in my presence, accurately reflects the service I, Dr. Guillen, personally performed, and the decisions made by me with my edits as appropriate.

## 2024-04-18 ENCOUNTER — TELEPHONE (OUTPATIENT)
Dept: GASTROENTEROLOGY | Facility: CLINIC | Age: 79
End: 2024-04-18
Payer: MEDICARE

## 2024-04-18 NOTE — TELEPHONE ENCOUNTER
PT IS DUE FOR A REPEAT COLONOSCOPY. CALLED NUMBER IN CHART, VOICE MAIL IS FULL AND COULD NOT LEAVE A MESSAGE.      LETTER SENT TO PCP.

## 2024-08-07 ENCOUNTER — OFFICE VISIT (OUTPATIENT)
Dept: NEUROLOGY | Age: 79
End: 2024-08-07
Payer: COMMERCIAL

## 2024-08-07 VITALS
HEIGHT: 65 IN | OXYGEN SATURATION: 97 % | HEART RATE: 68 BPM | DIASTOLIC BLOOD PRESSURE: 74 MMHG | SYSTOLIC BLOOD PRESSURE: 124 MMHG | BODY MASS INDEX: 33.32 KG/M2 | WEIGHT: 200 LBS

## 2024-08-07 DIAGNOSIS — R51.9 HEADACHE, UNSPECIFIED HEADACHE TYPE: ICD-10-CM

## 2024-08-07 DIAGNOSIS — G40.909 SEIZURE DISORDER (HCC): Primary | ICD-10-CM

## 2024-08-07 DIAGNOSIS — I63.9 CEREBROVASCULAR ACCIDENT (CVA), UNSPECIFIED MECHANISM (HCC): ICD-10-CM

## 2024-08-07 PROCEDURE — 1123F ACP DISCUSS/DSCN MKR DOCD: CPT | Performed by: PSYCHIATRY & NEUROLOGY

## 2024-08-07 PROCEDURE — 99214 OFFICE O/P EST MOD 30 MIN: CPT | Performed by: PSYCHIATRY & NEUROLOGY

## 2024-08-07 RX ORDER — DICLOFENAC EPOLAMINE 0.01 G/1
1 SYSTEM TOPICAL 2 TIMES DAILY
COMMUNITY
Start: 2024-07-23

## 2024-08-07 NOTE — PROGRESS NOTES
[facility] Office Note      Patient:   Michelle Al  MR#:    [id number]  Account Number:                         YOB: 1945  Date of Evaluation:  8/7/2024  Time of Note:                          11:29 AM  Primary/Referring Physician:  Gonzalo Rodriguez MD   Consulting Physician:  Sanjay Moreira,     FOLLOW UP VISIT    Chief Complaint   Patient presents with    6 Month Follow-Up    Seizures     No seizures to report        HISTORY OF PRESENT ILLNESS    Michelle Al is a 79 y.o. year old female here for stroke, headaches, possible seizure.  Back in November of 2020 patient suffered a stroke, right PCA distribution stroke per MRI.  CTA with PCA stenosis, possible thrombus noted.  CTA neck negative.   ECHO with EF 56-60%, moderate LVH, diastolic dysfunction.  EEG normal in the hospital.  Reports of another EEG with right temporal sharp waves.  Holter with PAT, PVC's, SR.  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 No abnormal enhancement.  PSG with ROBBI, PLM.  She is on Eliquis currently.  ESR normal.  CRP slightly elevated.  From a seizure standpoint, she overtly denies an overt GTC event or ED, no staring episodes.  No events since last seen, remains improved, and denies any prior seizure like activity.  She is on Keppra currently.  She did have some visual field loss due to her prior stroke.  No facial drooping, slurred speech, focal weakness or numbness.  Patient is currently not having any overt new symptoms or neurologic complaints.  Headaches overall are largely improved but are occurring intermittently, noting neuralgia, that are typical quick, shock-like pain, occurring sporadically.  She describes them as a \"flash\" and will come and go but may only occur a few times a month.  Location can vary, no facial involvement, no clear triggers. This has been present for years, even prior to the

## 2024-09-30 ENCOUNTER — TELEPHONE (OUTPATIENT)
Age: 79
End: 2024-09-30

## 2024-09-30 NOTE — TELEPHONE ENCOUNTER
Pt is having second thoughts about having knee surgery.  She asks if her artificial has already begun production.  If not, she asks that it be cancelled until she feels more comfortable with having surgery.  Please give pt a call back at [phone]

## 2024-10-01 ENCOUNTER — TELEPHONE (OUTPATIENT)
Age: 79
End: 2024-10-01

## 2024-10-01 NOTE — TELEPHONE ENCOUNTER
Patient reports she is very concerned about knee surgery due to being off eliquis and having it done outside the hospital. I informed patient I would discuss her concerns with Dr. Greene and would return call to patient. Patient asked that I contact her on 10/2/24 when she is back at her home. Sarah Gramajo

## 2024-11-10 DIAGNOSIS — M17.12 PRIMARY OSTEOARTHRITIS OF LEFT KNEE: Primary | ICD-10-CM

## 2024-11-11 DIAGNOSIS — Z96.652 AFTERCARE FOLLOWING LEFT KNEE JOINT REPLACEMENT SURGERY: Primary | ICD-10-CM

## 2024-11-11 DIAGNOSIS — Z47.1 AFTERCARE FOLLOWING LEFT KNEE JOINT REPLACEMENT SURGERY: Primary | ICD-10-CM

## 2024-11-11 RX ORDER — OXYCODONE HYDROCHLORIDE 5 MG/1
5 TABLET ORAL
Qty: 42 TABLET | Refills: 0 | Status: SHIPPED | OUTPATIENT
Start: 2024-11-11 | End: 2024-11-25

## 2024-11-11 NOTE — TELEPHONE ENCOUNTER
OXYCODONE 5MG 1-2 PO EVERY 4-6 HOURS PRN PAIN #[id number] NR SENT TO DR. PAREDES FOR APPROVAL AND TO SEND TO PATIENTS PHARMACY

## 2024-11-14 ENCOUNTER — TRANSCRIBE ORDERS (OUTPATIENT)
Dept: HOME HEALTH SERVICES | Facility: HOME HEALTHCARE | Age: 79
End: 2024-11-14
Payer: MEDICARE

## 2024-11-14 ENCOUNTER — HOME HEALTH ADMISSION (OUTPATIENT)
Dept: HOME HEALTH SERVICES | Facility: HOME HEALTHCARE | Age: 79
End: 2024-11-14
Payer: MEDICARE

## 2024-11-14 DIAGNOSIS — Z47.1 AFTERCARE FOLLOWING LEFT KNEE JOINT REPLACEMENT SURGERY: Primary | ICD-10-CM

## 2024-11-14 DIAGNOSIS — Z96.652 AFTERCARE FOLLOWING LEFT KNEE JOINT REPLACEMENT SURGERY: Primary | ICD-10-CM

## 2024-11-15 ENCOUNTER — HOME CARE VISIT (OUTPATIENT)
Dept: HOME HEALTH SERVICES | Facility: CLINIC | Age: 79
End: 2024-11-15
Payer: MEDICARE

## 2024-11-15 VITALS
SYSTOLIC BLOOD PRESSURE: 130 MMHG | DIASTOLIC BLOOD PRESSURE: 60 MMHG | HEART RATE: 81 BPM | OXYGEN SATURATION: 98 % | RESPIRATION RATE: 16 BRPM | TEMPERATURE: 96.9 F

## 2024-11-15 PROCEDURE — G0151 HHCP-SERV OF PT,EA 15 MIN: HCPCS

## 2024-11-15 NOTE — Clinical Note
"Patient was admitted to home health services on 11/15/2024.     SOC Note: aftercare left TKA with Dr. Santiago.  Patient had left knee surgery on 11/14/2024.     Home Health ordered for: disciplines: PT    Reason for Hosp/Primary Dx/Co-morbidities: aftercare left TKA    Focus of Care: aftercare left TKA    Patient's goal(s): \"walk without pain\"  \"walk straight\"    Current Functional status/mobility/DME: rollator, shower chair, diabetic supplies    HB status/Living Arrangements: Lives with family.     Skin Integrity/wound status: left knee surgical incision    Code Status: CPR    Fall Risk/Safety concerns: High    Educated on Emergency Plan, steps to take prior to going to the ER and when to Call Home Health First:  Yes     Medication issues/Concerns:    Additional Problems/Concerns:     SDOH Barriers (i.e. caregiver concerns, social isolation, transportation, food insecurity, environment, income etc.)/Need for MSW: No"

## 2024-11-15 NOTE — HOME HEALTH
"SOC Note: aftercare left TKA with Dr. Santiago.  Patient had left knee surgery on 11/14/2024.     Home Health ordered for: disciplines: PT    Reason for Hosp/Primary Dx/Co-morbidities: aftercare left TKA    Focus of Care: aftercare left TKA    Patient's goal(s): \"walk without pain\"  \"walk straight\"    Current Functional status/mobility/DME: rollator, shower chair, diabetic supplies    HB status/Living Arrangements: Lives with family.     Skin Integrity/wound status: left knee surgical incision    Code Status: CPR    Fall Risk/Safety concerns: High    Educated on Emergency Plan, steps to take prior to going to the ER and when to Call Home Health First:  Yes     Medication issues/Concerns:    Additional Problems/Concerns:     SDOH Barriers (i.e. caregiver concerns, social isolation, transportation, food insecurity, environment, income etc.)/Need for MSW: No"

## 2024-11-20 ENCOUNTER — HOME CARE VISIT (OUTPATIENT)
Dept: HOME HEALTH SERVICES | Facility: CLINIC | Age: 79
End: 2024-11-20
Payer: MEDICARE

## 2024-11-20 VITALS
OXYGEN SATURATION: 97 % | HEART RATE: 81 BPM | SYSTOLIC BLOOD PRESSURE: 174 MMHG | TEMPERATURE: 97.9 F | DIASTOLIC BLOOD PRESSURE: 70 MMHG | RESPIRATION RATE: 16 BRPM

## 2024-11-20 PROCEDURE — G0157 HHC PT ASSISTANT EA 15: HCPCS

## 2024-11-21 ENCOUNTER — HOME CARE VISIT (OUTPATIENT)
Dept: HOME HEALTH SERVICES | Facility: CLINIC | Age: 79
End: 2024-11-21
Payer: MEDICARE

## 2024-11-21 ENCOUNTER — TELEPHONE (OUTPATIENT)
Age: 79
End: 2024-11-21

## 2024-11-21 ENCOUNTER — LAB REQUISITION (OUTPATIENT)
Dept: LAB | Facility: HOSPITAL | Age: 79
End: 2024-11-21
Payer: MEDICARE

## 2024-11-21 DIAGNOSIS — Z47.1 AFTERCARE FOLLOWING JOINT REPLACEMENT SURGERY: ICD-10-CM

## 2024-11-21 LAB
ALBUMIN SERPL-MCNC: 3 G/DL (ref 3.5–5.2)
ALBUMIN/GLOB SERPL: 1.2 G/DL
ALP SERPL-CCNC: 84 U/L (ref 39–117)
ALT SERPL W P-5'-P-CCNC: 20 U/L (ref 1–33)
ANION GAP SERPL CALCULATED.3IONS-SCNC: 14 MMOL/L (ref 5–15)
AST SERPL-CCNC: 22 U/L (ref 1–32)
BASOPHILS # BLD AUTO: 0.02 10*3/MM3 (ref 0–0.2)
BASOPHILS NFR BLD AUTO: 0.2 % (ref 0–1.5)
BILIRUB SERPL-MCNC: 0.6 MG/DL (ref 0–1.2)
BUN SERPL-MCNC: 19 MG/DL (ref 8–23)
BUN/CREAT SERPL: 39.6 (ref 7–25)
CALCIUM SPEC-SCNC: 8.3 MG/DL (ref 8.6–10.5)
CHLORIDE SERPL-SCNC: 92 MMOL/L (ref 98–107)
CO2 SERPL-SCNC: 26 MMOL/L (ref 22–29)
CREAT SERPL-MCNC: 0.48 MG/DL (ref 0.57–1)
DEPRECATED RDW RBC AUTO: 50.7 FL (ref 37–54)
EGFRCR SERPLBLD CKD-EPI 2021: 97.1 ML/MIN/1.73
EOSINOPHIL # BLD AUTO: 0.25 10*3/MM3 (ref 0–0.4)
EOSINOPHIL NFR BLD AUTO: 2.9 % (ref 0.3–6.2)
ERYTHROCYTE [DISTWIDTH] IN BLOOD BY AUTOMATED COUNT: 15.3 % (ref 12.3–15.4)
GLOBULIN UR ELPH-MCNC: 2.6 GM/DL
GLUCOSE SERPL-MCNC: 128 MG/DL (ref 65–99)
HCT VFR BLD AUTO: 26.6 % (ref 34–46.6)
HGB BLD-MCNC: 8.3 G/DL (ref 12–15.9)
IMM GRANULOCYTES # BLD AUTO: 0.03 10*3/MM3 (ref 0–0.05)
IMM GRANULOCYTES NFR BLD AUTO: 0.3 % (ref 0–0.5)
LYMPHOCYTES # BLD AUTO: 1.52 10*3/MM3 (ref 0.7–3.1)
LYMPHOCYTES NFR BLD AUTO: 17.6 % (ref 19.6–45.3)
MCH RBC QN AUTO: 28.2 PG (ref 26.6–33)
MCHC RBC AUTO-ENTMCNC: 31.2 G/DL (ref 31.5–35.7)
MCV RBC AUTO: 90.5 FL (ref 79–97)
MONOCYTES # BLD AUTO: 1.16 10*3/MM3 (ref 0.1–0.9)
MONOCYTES NFR BLD AUTO: 13.4 % (ref 5–12)
NEUTROPHILS NFR BLD AUTO: 5.67 10*3/MM3 (ref 1.7–7)
NEUTROPHILS NFR BLD AUTO: 65.6 % (ref 42.7–76)
NRBC BLD AUTO-RTO: 0 /100 WBC (ref 0–0.2)
PLATELET # BLD AUTO: 426 10*3/MM3 (ref 140–450)
PMV BLD AUTO: 9.3 FL (ref 6–12)
POTASSIUM SERPL-SCNC: 3.7 MMOL/L (ref 3.5–5.2)
PROT SERPL-MCNC: 5.6 G/DL (ref 6–8.5)
RBC # BLD AUTO: 2.94 10*6/MM3 (ref 3.77–5.28)
SODIUM SERPL-SCNC: 132 MMOL/L (ref 136–145)
WBC NRBC COR # BLD AUTO: 8.65 10*3/MM3 (ref 3.4–10.8)

## 2024-11-21 PROCEDURE — G0299 HHS/HOSPICE OF RN EA 15 MIN: HCPCS

## 2024-11-21 PROCEDURE — 85025 COMPLETE CBC W/AUTO DIFF WBC: CPT | Performed by: ORTHOPAEDIC SURGERY

## 2024-11-21 PROCEDURE — 80053 COMPREHEN METABOLIC PANEL: CPT | Performed by: ORTHOPAEDIC SURGERY

## 2024-11-21 NOTE — TELEPHONE ENCOUNTER
Patient is calling regarding the following:  Pt family called said that the person that is taking care of her is no longer able to care for her. They want to know if she can be referral to nursing and rehab center.  I told Alycia that I would let you know that she needs to be sent to a Nursing home and Rehab center.  Please call Alycia at [phone] to let her know how/ what needs to be done to get her in a center.

## 2024-11-21 NOTE — TELEPHONE ENCOUNTER
Spoke with patient and informed her since she is 2 weeks post operative she will need to speak with her primary care provider to set up rehab. Patient stated she didn't want to go to a home but her kids wanted her to go.

## 2024-11-22 ENCOUNTER — HOME CARE VISIT (OUTPATIENT)
Dept: HOME HEALTH SERVICES | Facility: CLINIC | Age: 79
End: 2024-11-22
Payer: MEDICARE

## 2024-11-22 VITALS
OXYGEN SATURATION: 96 % | DIASTOLIC BLOOD PRESSURE: 55 MMHG | RESPIRATION RATE: 18 BRPM | SYSTOLIC BLOOD PRESSURE: 122 MMHG | TEMPERATURE: 97.7 F | HEART RATE: 85 BPM

## 2024-11-22 PROCEDURE — G0157 HHC PT ASSISTANT EA 15: HCPCS

## 2024-11-24 VITALS
DIASTOLIC BLOOD PRESSURE: 70 MMHG | HEART RATE: 88 BPM | OXYGEN SATURATION: 97 % | TEMPERATURE: 97 F | SYSTOLIC BLOOD PRESSURE: 128 MMHG | RESPIRATION RATE: 18 BRPM

## 2024-11-25 ENCOUNTER — HOME CARE VISIT (OUTPATIENT)
Dept: HOME HEALTH SERVICES | Facility: CLINIC | Age: 79
End: 2024-11-25
Payer: MEDICARE

## 2024-11-25 VITALS
SYSTOLIC BLOOD PRESSURE: 140 MMHG | DIASTOLIC BLOOD PRESSURE: 60 MMHG | OXYGEN SATURATION: 97 % | RESPIRATION RATE: 16 BRPM | HEART RATE: 78 BPM | TEMPERATURE: 97.7 F

## 2024-11-25 PROCEDURE — G0157 HHC PT ASSISTANT EA 15: HCPCS

## 2024-11-27 ENCOUNTER — TELEPHONE (OUTPATIENT)
Age: 79
End: 2024-11-27

## 2024-11-27 ENCOUNTER — HOME CARE VISIT (OUTPATIENT)
Dept: HOME HEALTH SERVICES | Facility: CLINIC | Age: 79
End: 2024-11-27
Payer: MEDICARE

## 2024-11-27 ENCOUNTER — HOME CARE VISIT (OUTPATIENT)
Dept: HOME HEALTH SERVICES | Facility: HOME HEALTHCARE | Age: 79
End: 2024-11-27
Payer: MEDICARE

## 2024-11-27 VITALS
SYSTOLIC BLOOD PRESSURE: 124 MMHG | TEMPERATURE: 97.8 F | HEART RATE: 81 BPM | RESPIRATION RATE: 16 BRPM | OXYGEN SATURATION: 98 % | DIASTOLIC BLOOD PRESSURE: 54 MMHG

## 2024-11-27 DIAGNOSIS — Z96.652 AFTERCARE FOLLOWING LEFT KNEE JOINT REPLACEMENT SURGERY: Primary | ICD-10-CM

## 2024-11-27 DIAGNOSIS — Z47.1 AFTERCARE FOLLOWING LEFT KNEE JOINT REPLACEMENT SURGERY: Primary | ICD-10-CM

## 2024-11-27 PROCEDURE — G0157 HHC PT ASSISTANT EA 15: HCPCS

## 2024-11-27 RX ORDER — OXYCODONE HYDROCHLORIDE 5 MG/1
5 TABLET ORAL
Qty: 42 TABLET | Refills: 0 | Status: SHIPPED | OUTPATIENT
Start: 2024-11-27 | End: 2024-12-04

## 2024-11-27 RX ORDER — CYCLOBENZAPRINE HCL 5 MG
5 TABLET ORAL 4 TIMES DAILY
Qty: 28 TABLET | Refills: 0 | Status: SHIPPED | OUTPATIENT
Start: 2024-11-27 | End: 2024-12-04

## 2024-11-27 NOTE — TELEPHONE ENCOUNTER
Patient is calling regarding the following:  pt daughter called to request refills Flexeril 5mg 1 every 8 hours as needed, Oxycondone 5 mg 1 tablet every 4-6 hours as needed.  Please call in to [facility] in [place]  phone [phone].

## 2024-12-02 ENCOUNTER — HOME CARE VISIT (OUTPATIENT)
Dept: HOME HEALTH SERVICES | Facility: CLINIC | Age: 79
End: 2024-12-02
Payer: MEDICARE

## 2024-12-02 VITALS
HEART RATE: 75 BPM | TEMPERATURE: 97.2 F | SYSTOLIC BLOOD PRESSURE: 150 MMHG | DIASTOLIC BLOOD PRESSURE: 72 MMHG | OXYGEN SATURATION: 98 % | RESPIRATION RATE: 16 BRPM

## 2024-12-02 PROCEDURE — G0157 HHC PT ASSISTANT EA 15: HCPCS

## 2024-12-04 ENCOUNTER — OFFICE VISIT (OUTPATIENT)
Age: 79
End: 2024-12-04

## 2024-12-04 ENCOUNTER — HOME CARE VISIT (OUTPATIENT)
Dept: HOME HEALTH SERVICES | Facility: CLINIC | Age: 79
End: 2024-12-04
Payer: MEDICARE

## 2024-12-04 VITALS
DIASTOLIC BLOOD PRESSURE: 60 MMHG | HEART RATE: 91 BPM | RESPIRATION RATE: 16 BRPM | OXYGEN SATURATION: 98 % | SYSTOLIC BLOOD PRESSURE: 120 MMHG | TEMPERATURE: 97.1 F

## 2024-12-04 VITALS — WEIGHT: 200 LBS | HEIGHT: 65 IN | BODY MASS INDEX: 33.32 KG/M2

## 2024-12-04 DIAGNOSIS — Z47.1 AFTERCARE FOLLOWING LEFT KNEE JOINT REPLACEMENT SURGERY: Primary | ICD-10-CM

## 2024-12-04 DIAGNOSIS — Z96.652 AFTERCARE FOLLOWING LEFT KNEE JOINT REPLACEMENT SURGERY: Primary | ICD-10-CM

## 2024-12-04 PROCEDURE — G0151 HHCP-SERV OF PT,EA 15 MIN: HCPCS

## 2024-12-04 PROCEDURE — 99024 POSTOP FOLLOW-UP VISIT: CPT | Performed by: PHYSICIAN ASSISTANT

## 2024-12-04 RX ORDER — CYCLOBENZAPRINE HCL 10 MG
10 TABLET ORAL 3 TIMES DAILY PRN
Qty: 30 TABLET | Refills: 0 | Status: SHIPPED | OUTPATIENT
Start: 2024-12-04 | End: 2024-12-14

## 2024-12-04 ASSESSMENT — ENCOUNTER SYMPTOMS
COLOR CHANGE: 0
BACK PAIN: 0

## 2024-12-04 NOTE — ASSESSMENT & PLAN NOTE
AP standing and lateral knee x-rays were obtained today in office of the left knee.  These demonstrate a stable left total knee arthroplasty with no obvious sign of subsidence, disassociation, dislocation, fracture.    The patient is to continue working with physical therapy to improve her range of motion.  We will send her in hospitals topical ointment to rub on the area of discomfort but I did advise her to keep this away from the incision site.  We will see her back in 4 weeks to see how she is progressing.

## 2024-12-04 NOTE — PROGRESS NOTES
[facility]  [facility]  [address] [place] [address]  Dept: [phone]  Dept Fax: [phone]  Loc: [phone]    Michelle Al is a 79 y.o. female who presents today for her medical conditions/complaints as noted below.  Michelle Al is complaining of Post-Op Check (Left Total Knee)        HPI:   Patient is a pleasant 79-year-old female presenting to the clinic today almost 3 weeks out from her left total knee replacement.  She states that the knee itself has done well and rates the pain is a 1 or 2 on a scale of 10.  However, she has noted a burning sensation in her lower leg following surgery that has been \"unbearable.\"  She states that it is very sensitive to touch such as when the blanket brushes across it.  She denies any numbness or difficulty with range of motion at the ankle or foot but that describes this as a burning sensation in the anterior aspect of the lower leg.        Past Medical History:   Diagnosis Date    Chronic GERD     CVA (cerebral vascular accident) (HCC)     Diverticulitis     Hypertension     Seizures (HCC)        Past Surgical History:   Procedure Laterality Date    APPENDECTOMY      BACK SURGERY      L T     GALLBLADDER SURGERY      TOTAL KNEE ARTHROPLASTY Bilateral     TUBAL LIGATION         Family History   Problem Relation Age of Onset    Alzheimer's Disease Sister        Social History     Tobacco Use    Smoking status: Former     Passive exposure: Past    Smokeless tobacco: Never   Substance Use Topics    Alcohol use: Never        Current Outpatient Medications   Medication Sig Dispense Refill    cyclobenzaprine (FLEXERIL) 10 MG tablet Take 1 tablet by mouth 3 times daily as needed for Muscle spasms 30 tablet 0    oxyCODONE (ROXICODONE) 5 MG immediate release tablet Take 1 tablet by mouth every 4-6 hours as needed for Pain for up to 7 days. Intended supply: 7 days. Take lowest dose possible to manage

## 2024-12-05 ENCOUNTER — TELEPHONE (OUTPATIENT)
Age: 79
End: 2024-12-05

## 2024-12-05 NOTE — TELEPHONE ENCOUNTER
Pt's Son is calling on behalf of pt. He states his mother was seen by Sari and Sari was going to send a compound prescription to [facility] and he has contacted them and they don't have a prescription for that.   He would like a call back at [phone]

## 2024-12-09 ENCOUNTER — HOME CARE VISIT (OUTPATIENT)
Dept: HOME HEALTH SERVICES | Facility: CLINIC | Age: 79
End: 2024-12-09
Payer: MEDICARE

## 2024-12-09 VITALS
DIASTOLIC BLOOD PRESSURE: 68 MMHG | RESPIRATION RATE: 18 BRPM | TEMPERATURE: 98.1 F | SYSTOLIC BLOOD PRESSURE: 130 MMHG | OXYGEN SATURATION: 96 % | HEART RATE: 91 BPM

## 2024-12-09 PROCEDURE — G0157 HHC PT ASSISTANT EA 15: HCPCS

## 2024-12-11 ENCOUNTER — HOME CARE VISIT (OUTPATIENT)
Dept: HOME HEALTH SERVICES | Facility: CLINIC | Age: 79
End: 2024-12-11
Payer: MEDICARE

## 2024-12-11 VITALS
TEMPERATURE: 97.5 F | SYSTOLIC BLOOD PRESSURE: 170 MMHG | RESPIRATION RATE: 18 BRPM | HEART RATE: 83 BPM | OXYGEN SATURATION: 98 % | DIASTOLIC BLOOD PRESSURE: 80 MMHG

## 2024-12-11 PROCEDURE — G0157 HHC PT ASSISTANT EA 15: HCPCS

## 2024-12-16 ENCOUNTER — HOME CARE VISIT (OUTPATIENT)
Dept: HOME HEALTH SERVICES | Facility: CLINIC | Age: 79
End: 2024-12-16
Payer: MEDICARE

## 2024-12-16 VITALS
SYSTOLIC BLOOD PRESSURE: 168 MMHG | DIASTOLIC BLOOD PRESSURE: 72 MMHG | OXYGEN SATURATION: 98 % | HEART RATE: 75 BPM | RESPIRATION RATE: 18 BRPM | TEMPERATURE: 97.8 F

## 2024-12-16 PROCEDURE — G0157 HHC PT ASSISTANT EA 15: HCPCS

## 2024-12-17 ENCOUNTER — TELEPHONE (OUTPATIENT)
Age: 79
End: 2024-12-17

## 2024-12-18 ENCOUNTER — HOME CARE VISIT (OUTPATIENT)
Dept: HOME HEALTH SERVICES | Facility: CLINIC | Age: 79
End: 2024-12-18
Payer: MEDICARE

## 2024-12-18 VITALS
RESPIRATION RATE: 14 BRPM | DIASTOLIC BLOOD PRESSURE: 80 MMHG | TEMPERATURE: 96.9 F | HEART RATE: 82 BPM | OXYGEN SATURATION: 99 % | SYSTOLIC BLOOD PRESSURE: 174 MMHG

## 2024-12-18 PROCEDURE — G0157 HHC PT ASSISTANT EA 15: HCPCS

## 2024-12-23 ENCOUNTER — HOME CARE VISIT (OUTPATIENT)
Dept: HOME HEALTH SERVICES | Facility: CLINIC | Age: 79
End: 2024-12-23
Payer: MEDICARE

## 2024-12-23 VITALS
DIASTOLIC BLOOD PRESSURE: 60 MMHG | OXYGEN SATURATION: 99 % | RESPIRATION RATE: 16 BRPM | TEMPERATURE: 97.3 F | SYSTOLIC BLOOD PRESSURE: 150 MMHG | HEART RATE: 89 BPM

## 2024-12-23 PROCEDURE — G0151 HHCP-SERV OF PT,EA 15 MIN: HCPCS

## 2024-12-26 ENCOUNTER — HOME CARE VISIT (OUTPATIENT)
Dept: HOME HEALTH SERVICES | Facility: CLINIC | Age: 79
End: 2024-12-26
Payer: MEDICARE

## 2024-12-26 VITALS
DIASTOLIC BLOOD PRESSURE: 70 MMHG | RESPIRATION RATE: 16 BRPM | TEMPERATURE: 97.2 F | HEART RATE: 83 BPM | OXYGEN SATURATION: 98 % | SYSTOLIC BLOOD PRESSURE: 126 MMHG

## 2024-12-26 PROCEDURE — G0151 HHCP-SERV OF PT,EA 15 MIN: HCPCS

## 2024-12-30 ENCOUNTER — HOME CARE VISIT (OUTPATIENT)
Dept: HOME HEALTH SERVICES | Facility: CLINIC | Age: 79
End: 2024-12-30
Payer: MEDICARE

## 2024-12-30 VITALS
SYSTOLIC BLOOD PRESSURE: 130 MMHG | OXYGEN SATURATION: 98 % | RESPIRATION RATE: 16 BRPM | DIASTOLIC BLOOD PRESSURE: 60 MMHG | HEART RATE: 84 BPM | TEMPERATURE: 96.9 F

## 2024-12-30 PROCEDURE — G0151 HHCP-SERV OF PT,EA 15 MIN: HCPCS

## 2025-01-02 ENCOUNTER — HOME CARE VISIT (OUTPATIENT)
Dept: HOME HEALTH SERVICES | Facility: CLINIC | Age: 80
End: 2025-01-02
Payer: MEDICARE

## 2025-01-02 VITALS
HEART RATE: 72 BPM | RESPIRATION RATE: 18 BRPM | DIASTOLIC BLOOD PRESSURE: 62 MMHG | OXYGEN SATURATION: 98 % | TEMPERATURE: 97.9 F | SYSTOLIC BLOOD PRESSURE: 149 MMHG

## 2025-01-02 PROCEDURE — G0157 HHC PT ASSISTANT EA 15: HCPCS

## 2025-01-07 ENCOUNTER — OFFICE VISIT (OUTPATIENT)
Age: 80
End: 2025-01-07

## 2025-01-07 VITALS — HEIGHT: 65 IN | BODY MASS INDEX: 33.32 KG/M2 | WEIGHT: 200 LBS

## 2025-01-07 DIAGNOSIS — Z47.1 AFTERCARE FOLLOWING LEFT KNEE JOINT REPLACEMENT SURGERY: Primary | ICD-10-CM

## 2025-01-07 DIAGNOSIS — Z96.652 PRESENCE OF ARTIFICIAL KNEE JOINT, LEFT: ICD-10-CM

## 2025-01-07 DIAGNOSIS — Z96.652 AFTERCARE FOLLOWING LEFT KNEE JOINT REPLACEMENT SURGERY: Primary | ICD-10-CM

## 2025-01-07 PROCEDURE — 99024 POSTOP FOLLOW-UP VISIT: CPT | Performed by: ORTHOPAEDIC SURGERY

## 2025-01-07 ASSESSMENT — ENCOUNTER SYMPTOMS
BACK PAIN: 0
COLOR CHANGE: 0

## 2025-01-07 NOTE — PROGRESS NOTES
[facility]  [facility]  [address] [facility] [place] [address]  Dept: [phone]  Dept Fax: [phone]  Loc: [phone]    Michelle Al is a 80 y.o. female who presents today for her medical conditions/complaints as noted below.  Michelle Al is complaining of Follow-up (Left knee)        HPI:   The patient is a pleasant 80-year-old female presenting to the clinic today almost 8 weeks out from her left total knee replacement.  She states that the knee itself has done well and rates the pain is a 1 or 2 on a scale of 10.  However, she previously noted a burning sensation in her lower leg following surgery that was described as being \"unbearable.\"  She states that it was very sensitive to touch such as when the blanket brushes across it.  She continues to deny any numbness or difficulty with range of motion at the ankle or foot, and thankfully reports that that burning sensation down the anterior aspect of the left lower extremity has resolved over the last week.  She has yet to participate in formal outpatient therapy electing to pursue in-home therapy to date.        Past Medical History:   Diagnosis Date    Chronic GERD     CVA (cerebral vascular accident) (HCC)     Diverticulitis     Hypertension     Seizures (HCC)        Past Surgical History:   Procedure Laterality Date    APPENDECTOMY      BACK SURGERY      L T     GALLBLADDER SURGERY      TOTAL KNEE ARTHROPLASTY Bilateral     TUBAL LIGATION         Family History   Problem Relation Age of Onset    Alzheimer's Disease Sister        Social History     Tobacco Use    Smoking status: Former     Current packs/day: 0.00     Types: Cigarettes     Quit date: 1970     Years since quittin.0     Passive exposure: Past    Smokeless tobacco: Never   Substance Use Topics    Alcohol use: Never        Current Outpatient Medications   Medication Sig Dispense Refill    diclofenac (FLECTOR) 1.3 % PTCH

## 2025-01-08 ENCOUNTER — HOME CARE VISIT (OUTPATIENT)
Dept: HOME HEALTH SERVICES | Facility: CLINIC | Age: 80
End: 2025-01-08
Payer: MEDICARE

## 2025-01-08 ENCOUNTER — TELEPHONE (OUTPATIENT)
Age: 80
End: 2025-01-08

## 2025-01-08 VITALS
DIASTOLIC BLOOD PRESSURE: 70 MMHG | SYSTOLIC BLOOD PRESSURE: 162 MMHG | RESPIRATION RATE: 14 BRPM | OXYGEN SATURATION: 98 % | TEMPERATURE: 97.3 F | HEART RATE: 81 BPM

## 2025-01-08 PROCEDURE — G0157 HHC PT ASSISTANT EA 15: HCPCS

## 2025-01-08 NOTE — TELEPHONE ENCOUNTER
Pt called said [facility] has not received her PT orders, would you please fax to [phone]

## 2025-01-08 NOTE — TELEPHONE ENCOUNTER
Contacted patient to inform her that I faxed the physical therapy to [facility] outpatient physical therapy. Received conformation . I encouraged her if she had any further issues or concerns to contact our office patient verbalized understanding

## 2025-01-09 ENCOUNTER — HOME CARE VISIT (OUTPATIENT)
Dept: HOME HEALTH SERVICES | Facility: CLINIC | Age: 80
End: 2025-01-09
Payer: MEDICARE

## 2025-01-09 VITALS
OXYGEN SATURATION: 98 % | SYSTOLIC BLOOD PRESSURE: 140 MMHG | TEMPERATURE: 97.3 F | DIASTOLIC BLOOD PRESSURE: 80 MMHG | HEART RATE: 101 BPM | RESPIRATION RATE: 16 BRPM

## 2025-01-09 PROCEDURE — G0151 HHCP-SERV OF PT,EA 15 MIN: HCPCS

## 2025-01-16 ENCOUNTER — TELEPHONE (OUTPATIENT)
Age: 80
End: 2025-01-16

## 2025-01-16 DIAGNOSIS — Z47.1 AFTERCARE FOLLOWING LEFT KNEE JOINT REPLACEMENT SURGERY: Primary | ICD-10-CM

## 2025-01-16 DIAGNOSIS — Z96.652 AFTERCARE FOLLOWING LEFT KNEE JOINT REPLACEMENT SURGERY: Primary | ICD-10-CM

## 2025-01-16 NOTE — TELEPHONE ENCOUNTER
Charo from the PT department from [facility] called said that the patient needs to come 4 times a week instill of 3 and would like to know if you would send a new order stating 4 times a week for 6 weeks, the patient has appt. Tomorrow and that will be her 4th one this week, they will have to cancel it if they don't get the new order with 4 times a week.  Please fax them a new order today

## 2025-02-04 ENCOUNTER — TELEPHONE (OUTPATIENT)
Age: 80
End: 2025-02-04

## 2025-02-04 DIAGNOSIS — Z47.1 AFTERCARE FOLLOWING LEFT KNEE JOINT REPLACEMENT SURGERY: Primary | ICD-10-CM

## 2025-02-04 DIAGNOSIS — Z96.652 AFTERCARE FOLLOWING LEFT KNEE JOINT REPLACEMENT SURGERY: Primary | ICD-10-CM

## 2025-02-10 ENCOUNTER — OFFICE VISIT (OUTPATIENT)
Dept: NEUROLOGY | Age: 80
End: 2025-02-10

## 2025-02-10 VITALS
HEIGHT: 65 IN | WEIGHT: 200 LBS | BODY MASS INDEX: 33.32 KG/M2 | HEART RATE: 79 BPM | DIASTOLIC BLOOD PRESSURE: 80 MMHG | SYSTOLIC BLOOD PRESSURE: 128 MMHG | OXYGEN SATURATION: 99 %

## 2025-02-10 DIAGNOSIS — I63.9 CEREBROVASCULAR ACCIDENT (CVA), UNSPECIFIED MECHANISM (HCC): ICD-10-CM

## 2025-02-10 DIAGNOSIS — R51.9 HEADACHE, UNSPECIFIED HEADACHE TYPE: ICD-10-CM

## 2025-02-10 DIAGNOSIS — G40.909 SEIZURE DISORDER (HCC): Primary | ICD-10-CM

## 2025-02-10 RX ORDER — AZELASTINE HYDROCHLORIDE 137 UG/1
2 SPRAY, METERED NASAL DAILY
COMMUNITY
Start: 2024-11-05

## 2025-02-10 RX ORDER — APIXABAN 2.5 MG/1
2.5 TABLET, FILM COATED ORAL 2 TIMES DAILY
Qty: 180 TABLET | Refills: 3 | Status: SHIPPED | OUTPATIENT
Start: 2025-02-10

## 2025-02-10 RX ORDER — CELECOXIB 200 MG/1
200 CAPSULE ORAL DAILY
COMMUNITY
Start: 2024-11-08

## 2025-02-10 RX ORDER — LEVETIRACETAM 500 MG/1
500 TABLET ORAL 2 TIMES DAILY
Qty: 180 TABLET | Refills: 3 | Status: SHIPPED | OUTPATIENT
Start: 2025-02-10

## 2025-02-10 NOTE — PROGRESS NOTES
[facility] Office Note      Patient:   Michelle Al  MR#:    [id number]  Account Number:                         YOB: 1945  Date of Evaluation:  2/10/2025  Time of Note:                          10:53 AM  Primary/Referring Physician:  Gonzalo Rodriguez MD   Consulting Physician:  Sanjay Moreira,     FOLLOW UP VISIT    Chief Complaint   Patient presents with    6 Month Follow-Up    Seizures     No seizures to report     Dizziness     That has resolved        HISTORY OF PRESENT ILLNESS    Michelle Al is a 80 y.o. year old female here for stroke, headaches, possible seizure.  Back in November of 2020 patient suffered a stroke, right PCA distribution stroke per MRI.  CTA with PCA stenosis, possible thrombus noted.  CTA neck negative.   ECHO with EF 56-60%, moderate LVH, diastolic dysfunction.  EEG normal in the hospital.  Reports of another EEG with right temporal sharp waves.  Holter with PAT, PVC's, SR.  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 No abnormal enhancement.  PSG with ROBBI, PLM.  She is on Eliquis currently.  ESR normal.  CRP slightly elevated.  From a seizure standpoint, she overtly denies an overt GTC event or ED, no staring episodes.  No events since last seen, remains improved, and denies any prior seizure like activity.  She is on Keppra currently.  She did have some visual field loss due to her prior stroke.  No facial drooping, slurred speech, focal weakness or numbness.  Patient is currently not having any overt new symptoms or neurologic complaints.  Headaches overall are largely improved but are occurring intermittently, noting neuralgia, that are typical quick, shock-like pain, occurring sporadically.  She describes them as a \"flash\" and will come and go but may only occur a few times a month.  Location can vary, no facial involvement, no clear triggers. This has been

## 2025-02-18 ENCOUNTER — OFFICE VISIT (OUTPATIENT)
Age: 80
End: 2025-02-18

## 2025-02-18 DIAGNOSIS — Z96.652 PRESENCE OF ARTIFICIAL KNEE JOINT, LEFT: Primary | ICD-10-CM

## 2025-02-18 DIAGNOSIS — M24.662 ANKYLOSIS, LEFT KNEE: ICD-10-CM

## 2025-02-18 ASSESSMENT — ENCOUNTER SYMPTOMS
GASTROINTESTINAL NEGATIVE: 1
EYES NEGATIVE: 1
RESPIRATORY NEGATIVE: 1
ALLERGIC/IMMUNOLOGIC NEGATIVE: 1

## 2025-02-18 NOTE — PROGRESS NOTES
[facility]  [facility]  [address]  [facility] [address] [phone]       Michelle Al (:  1945) is a 80 y.o. female,Established patient, here for evaluation of the following chief complaint(s):  Follow-up (Left knee)        Assessment & Plan  1. Knee range of motion.  Her knee extension is showing signs of improvement, but the flexion remains at 93 degrees. The potential benefits and risks associated with manipulation were thoroughly discussed. It was explained that manipulation might not significantly improve extension but could aid in enhancing flexion. The procedure would involve a brief period of anesthesia, followed by bending the knee to stretch out scar tissue. Post-manipulation, daily physical therapy for a couple of weeks would be required, followed by continued therapy 2 to 3 times a week in conjunction with the CPM machine. She was advised to consider this option within the 4 to 6-month window post-surgery to avoid robust scar tissue formation, which could increase the risk of fracture. She expressed a preference to wait for 2 to 3 weeks to observe the effects of the CPM machine before making a decision.    Follow-up  The patient will follow up in 2 to 3 weeks.       ICD-10-CM    1. Presence of artificial knee joint, left  Z96.652       2. Ankylosis, left knee  M24.662           Return in about 3 weeks (around 3/11/2025).    SUBJECTIVE/OBJECTIVE:  History of Present Illness  The patient is a 80-year-old female who presents for evaluation of knee range of motion.    She reports a lack of significant improvement in her knee range of motion, with a maximum flexion of 93 degrees. She is scheduled to receive a Continuous Passive Motion (CPM) machine on the upcoming Friday.       Review of Systems   Constitutional: Negative.    HENT: Negative.     Eyes: Negative.    Respiratory: Negative.     Cardiovascular: Negative.

## 2025-03-05 ENCOUNTER — OFFICE VISIT (OUTPATIENT)
Age: 80
End: 2025-03-05
Payer: MEDICARE

## 2025-03-05 VITALS — WEIGHT: 194.5 LBS | HEIGHT: 65 IN | BODY MASS INDEX: 32.4 KG/M2

## 2025-03-05 DIAGNOSIS — M24.662 ANKYLOSIS, LEFT KNEE: Primary | ICD-10-CM

## 2025-03-05 DIAGNOSIS — Z96.652 PRESENCE OF ARTIFICIAL KNEE JOINT, LEFT: ICD-10-CM

## 2025-03-05 PROCEDURE — 1123F ACP DISCUSS/DSCN MKR DOCD: CPT | Performed by: ORTHOPAEDIC SURGERY

## 2025-03-05 PROCEDURE — 99214 OFFICE O/P EST MOD 30 MIN: CPT | Performed by: ORTHOPAEDIC SURGERY

## 2025-03-05 PROCEDURE — 1159F MED LIST DOCD IN RCRD: CPT | Performed by: ORTHOPAEDIC SURGERY

## 2025-03-05 ASSESSMENT — ENCOUNTER SYMPTOMS
RESPIRATORY NEGATIVE: 1
GASTROINTESTINAL NEGATIVE: 1
ALLERGIC/IMMUNOLOGIC NEGATIVE: 1
EYES NEGATIVE: 1

## 2025-03-05 NOTE — PROGRESS NOTES
bathtub independently.       Review of Systems   Constitutional: Negative.    HENT: Negative.     Eyes: Negative.    Respiratory: Negative.     Cardiovascular: Negative.    Gastrointestinal: Negative.    Skin: Negative.    Allergic/Immunologic: Negative.    Neurological: Negative.    Psychiatric/Behavioral: Negative.         Physical Exam  On physical examination, the patient still lacks terminal extension by 8 to 10 degrees.  She demonstrates 90 degrees of knee flexion.  The incision is benign in appearance and well-healed.  Muscle compartments are soft compressible.  She demonstrates the ability to ambulate without the use of a cane though she is carrying on with her at today's visit.  Sensation is intact distally.  The left lower extremity is warm and well-perfused.                 An electronic signature was used to authenticate this note.    --David Greene MD   The patient (or guardian, if applicable) and other individuals in attendance with the patient were advised that Artificial Intelligence will be utilized during this visit to record, process the conversation to generate a clinical note, and support improvement of the AI technology. The patient (or guardian, if applicable) and other individuals in attendance at the appointment consented to the use of AI, including the recording.

## 2025-03-20 ENCOUNTER — TELEPHONE (OUTPATIENT)
Age: 80
End: 2025-03-20

## 2025-04-30 ENCOUNTER — OFFICE VISIT (OUTPATIENT)
Age: 80
End: 2025-04-30
Payer: MEDICARE

## 2025-04-30 VITALS — BODY MASS INDEX: 32.87 KG/M2 | HEIGHT: 65 IN

## 2025-04-30 DIAGNOSIS — Z96.652 AFTERCARE FOLLOWING LEFT KNEE JOINT REPLACEMENT SURGERY: ICD-10-CM

## 2025-04-30 DIAGNOSIS — Z47.1 AFTERCARE FOLLOWING LEFT KNEE JOINT REPLACEMENT SURGERY: ICD-10-CM

## 2025-04-30 DIAGNOSIS — M24.662 ANKYLOSIS, LEFT KNEE: Primary | ICD-10-CM

## 2025-04-30 DIAGNOSIS — Z96.652 PRESENCE OF ARTIFICIAL KNEE JOINT, LEFT: ICD-10-CM

## 2025-04-30 PROCEDURE — 1123F ACP DISCUSS/DSCN MKR DOCD: CPT | Performed by: ORTHOPAEDIC SURGERY

## 2025-04-30 PROCEDURE — 99214 OFFICE O/P EST MOD 30 MIN: CPT | Performed by: ORTHOPAEDIC SURGERY

## 2025-04-30 PROCEDURE — 1159F MED LIST DOCD IN RCRD: CPT | Performed by: ORTHOPAEDIC SURGERY

## 2025-04-30 ASSESSMENT — ENCOUNTER SYMPTOMS
RESPIRATORY NEGATIVE: 1
EYES NEGATIVE: 1
ALLERGIC/IMMUNOLOGIC NEGATIVE: 1
GASTROINTESTINAL NEGATIVE: 1

## 2025-04-30 NOTE — PROGRESS NOTES
[facility]  [facility]  [address]  [facility] [address] [phone]       Michelle Al (:  1945) is a 80 y.o. female,Established patient, here for evaluation of the following chief complaint(s):  Follow-up (Left knee )        Assessment & Plan  1. Postoperative status following total knee arthroplasty with resulting ankylosis:  Deficit of 8 to 10 degrees in terminal extension and approximately 90 degrees of knee flexion. Risks associated with manipulation, including fracture due to soft bone quality, were discussed. Open lysis of adhesions considered but deemed too high risk for infection.    Encouraged to continue using stairs for exercise and consider investing in a rowing machine for further rehabilitation. Outpatient physical therapy recommended over home-based therapy. Referral for formal physical therapy will be provided.    Follow-up: Referral for formal physical therapy at [facility].       ICD-10-CM    1. Ankylosis, left knee  M24.662 External Referral To Physical Therapy      2. Aftercare following left knee joint replacement surgery  Z47.1 External Referral To Physical Therapy    Z96.652       3. Presence of artificial knee joint, left  Z96.652           Return in about 8 weeks (around 2025).    SUBJECTIVE/OBJECTIVE:  History of Present Illness  The patient presents for evaluation of knee pain.    She has been utilizing a Continuous Passive Motion (CPM) machine during her sleep, but reports an inability to fully extend her knee. Her current range of motion is limited to 14 degrees, and difficulty in bending her knee is noted, particularly when attempting to enter the bathtub. A total knee replacement was performed on 2024. Postoperatively, home therapy was received for a duration of 2 months, which was found unhelpful. Six exercises were provided to perform at home, including standing at a railing, walking

## 2025-05-06 ENCOUNTER — TELEPHONE (OUTPATIENT)
Dept: NEUROSURGERY | Age: 80
End: 2025-05-06

## 2025-05-06 NOTE — TELEPHONE ENCOUNTER
Patient called to schedule appt to be seen for dizziness/Vertigo. Please return her call.    Thank you!

## 2025-05-15 ENCOUNTER — HOSPITAL ENCOUNTER (INPATIENT)
Age: 80
LOS: 5 days | Discharge: NURSING, REHAB OR LONG-TERM CARE | DRG: 481 | End: 2025-05-20
Attending: EMERGENCY MEDICINE | Admitting: STUDENT IN AN ORGANIZED HEALTH CARE EDUCATION/TRAINING PROGRAM
Payer: MEDICARE

## 2025-05-15 ENCOUNTER — APPOINTMENT (OUTPATIENT)
Dept: CT IMAGING | Age: 80
DRG: 481 | End: 2025-05-15
Payer: MEDICARE

## 2025-05-15 ENCOUNTER — APPOINTMENT (OUTPATIENT)
Dept: GENERAL RADIOLOGY | Age: 80
DRG: 481 | End: 2025-05-15
Payer: MEDICARE

## 2025-05-15 DIAGNOSIS — Z79.01 ON CONTINUOUS ORAL ANTICOAGULATION: ICD-10-CM

## 2025-05-15 DIAGNOSIS — S72.402A CLOSED FRACTURE OF DISTAL END OF LEFT FEMUR, UNSPECIFIED FRACTURE MORPHOLOGY, INITIAL ENCOUNTER (HCC): ICD-10-CM

## 2025-05-15 DIAGNOSIS — S42.342A CLOSED DISPLACED SPIRAL FRACTURE OF SHAFT OF LEFT HUMERUS, INITIAL ENCOUNTER: ICD-10-CM
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
## 2025-05-15 DIAGNOSIS — S00.81XA ABRASION OF FOREHEAD, INITIAL ENCOUNTER: ICD-10-CM

## 2025-05-15 DIAGNOSIS — R42 VERTIGO: ICD-10-CM

## 2025-05-15 PROBLEM — M54.16 LUMBAR RADICULOPATHY: Status: ACTIVE | Noted: 2025-05-15

## 2025-05-15 PROBLEM — S72.492A CLOSED COMMINUTED INTRA-ARTICULAR FRACTURE OF DISTAL FEMUR, LEFT, INITIAL ENCOUNTER (HCC): Status: ACTIVE | Noted: 2025-05-15

## 2025-05-15 LAB
ALBUMIN SERPL-MCNC: 3.6 G/DL (ref 3.5–5.2)
ALP SERPL-CCNC: 96 U/L (ref 35–104)
ALT SERPL-CCNC: 11 U/L (ref 10–35)
ANION GAP SERPL CALCULATED.3IONS-SCNC: 13 MMOL/L (ref 8–16)
AST SERPL-CCNC: 17 U/L (ref 10–35)
BASOPHILS # BLD: 0 K/UL (ref 0–0.2)
BASOPHILS NFR BLD: 0.2 % (ref 0–1)
BILIRUB SERPL-MCNC: 0.4 MG/DL (ref 0.2–1.2)
BUN SERPL-MCNC: 15 MG/DL (ref 8–23)
CALCIUM SERPL-MCNC: 8.6 MG/DL (ref 8.8–10.2)
CHLORIDE SERPL-SCNC: 102 MMOL/L (ref 98–107)
CO2 SERPL-SCNC: 23 MMOL/L (ref 22–29)
CREAT SERPL-MCNC: 0.6 MG/DL (ref 0.5–0.9)
EOSINOPHIL # BLD: 0.2 K/UL (ref 0–0.6)
EOSINOPHIL NFR BLD: 1.9 % (ref 0–5)
ERYTHROCYTE [DISTWIDTH] IN BLOOD BY AUTOMATED COUNT: 12.8 % (ref 11.5–14.5)
GLUCOSE SERPL-MCNC: 277 MG/DL (ref 70–99)
HCT VFR BLD AUTO: 32.9 % (ref 37–47)
HGB BLD-MCNC: 10.8 G/DL (ref 12–16)
IMM GRANULOCYTES # BLD: 0.1 K/UL
INR PPP: 1.2 (ref 0.88–1.18)
LYMPHOCYTES # BLD: 1.5 K/UL (ref 1.1–4.5)
LYMPHOCYTES NFR BLD: 15.4 % (ref 20–40)
MCH RBC QN AUTO: 30.9 PG (ref 27–31)
MCHC RBC AUTO-ENTMCNC: 32.8 G/DL (ref 33–37)
MCV RBC AUTO: 94 FL (ref 81–99)
MONOCYTES # BLD: 0.8 K/UL (ref 0–0.9)
MONOCYTES NFR BLD: 7.9 % (ref 0–10)
NEUTROPHILS # BLD: 7.3 K/UL (ref 1.5–7.5)
NEUTS SEG NFR BLD: 73.9 % (ref 50–65)
PLATELET # BLD AUTO: 288 K/UL (ref 130–400)
PMV BLD AUTO: 9.8 FL (ref 9.4–12.3)
POTASSIUM SERPL-SCNC: 3.5 MMOL/L (ref 3.5–5.1)
PROT SERPL-MCNC: 6 G/DL (ref 6.4–8.3)
PROTHROMBIN TIME: 15.2 SEC (ref 12–14.6)
RBC # BLD AUTO: 3.5 M/UL (ref 4.2–5.4)
SODIUM SERPL-SCNC: 138 MMOL/L (ref 136–145)
WBC # BLD AUTO: 9.9 K/UL (ref 4.8–10.8)

## 2025-05-15 PROCEDURE — 73060 X-RAY EXAM OF HUMERUS: CPT

## 2025-05-15 PROCEDURE — 1200000000 HC SEMI PRIVATE

## 2025-05-15 PROCEDURE — 72170 X-RAY EXAM OF PELVIS: CPT

## 2025-05-15 PROCEDURE — 85025 COMPLETE CBC W/AUTO DIFF WBC: CPT

## 2025-05-15 PROCEDURE — 71045 X-RAY EXAM CHEST 1 VIEW: CPT

## 2025-05-15 PROCEDURE — 6360000002 HC RX W HCPCS: Performed by: EMERGENCY MEDICINE

## 2025-05-15 PROCEDURE — 6370000000 HC RX 637 (ALT 250 FOR IP)

## 2025-05-15 PROCEDURE — 99285 EMERGENCY DEPT VISIT HI MDM: CPT

## 2025-05-15 PROCEDURE — 96374 THER/PROPH/DIAG INJ IV PUSH: CPT

## 2025-05-15 PROCEDURE — 85610 PROTHROMBIN TIME: CPT

## 2025-05-15 PROCEDURE — 70450 CT HEAD/BRAIN W/O DYE: CPT

## 2025-05-15 PROCEDURE — 2700000000 HC OXYGEN THERAPY PER DAY

## 2025-05-15 PROCEDURE — 6360000002 HC RX W HCPCS

## 2025-05-15 PROCEDURE — 2500000003 HC RX 250 WO HCPCS

## 2025-05-15 PROCEDURE — 94760 N-INVAS EAR/PLS OXIMETRY 1: CPT

## 2025-05-15 PROCEDURE — 80053 COMPREHEN METABOLIC PANEL: CPT

## 2025-05-15 PROCEDURE — 73552 X-RAY EXAM OF FEMUR 2/>: CPT

## 2025-05-15 PROCEDURE — 96376 TX/PRO/DX INJ SAME DRUG ADON: CPT

## 2025-05-15 PROCEDURE — 72125 CT NECK SPINE W/O DYE: CPT

## 2025-05-15 PROCEDURE — 36415 COLL VENOUS BLD VENIPUNCTURE: CPT

## 2025-05-15 RX ORDER — SODIUM CHLORIDE 0.9 % (FLUSH) 0.9 %
5-40 SYRINGE (ML) INJECTION EVERY 12 HOURS SCHEDULED
Status: DISCONTINUED | OUTPATIENT
Start: 2025-05-15 | End: 2025-05-20 | Stop reason: HOSPADM

## 2025-05-15 RX ORDER — OXYCODONE HYDROCHLORIDE 5 MG/1
5 TABLET ORAL EVERY 4 HOURS PRN
Refills: 0 | Status: DISCONTINUED | OUTPATIENT
Start: 2025-05-15 | End: 2025-05-17 | Stop reason: SDUPTHER

## 2025-05-15 RX ORDER — MAGNESIUM SULFATE IN WATER 40 MG/ML
2000 INJECTION, SOLUTION INTRAVENOUS PRN
Status: DISCONTINUED | OUTPATIENT
Start: 2025-05-15 | End: 2025-05-20 | Stop reason: HOSPADM

## 2025-05-15 RX ORDER — HYDROMORPHONE HYDROCHLORIDE 1 MG/ML
0.25 INJECTION, SOLUTION INTRAMUSCULAR; INTRAVENOUS; SUBCUTANEOUS
Refills: 0 | Status: DISCONTINUED | OUTPATIENT
Start: 2025-05-15 | End: 2025-05-17 | Stop reason: SDUPTHER

## 2025-05-15 RX ORDER — ONDANSETRON 4 MG/1
4 TABLET, ORALLY DISINTEGRATING ORAL EVERY 8 HOURS PRN
Status: DISCONTINUED | OUTPATIENT
Start: 2025-05-15 | End: 2025-05-20 | Stop reason: HOSPADM

## 2025-05-15 RX ORDER — LOSARTAN POTASSIUM 100 MG/1
100 TABLET ORAL DAILY
Status: DISCONTINUED | OUTPATIENT
Start: 2025-05-15 | End: 2025-05-20 | Stop reason: HOSPADM

## 2025-05-15 RX ORDER — ACETAMINOPHEN 650 MG/1
650 SUPPOSITORY RECTAL EVERY 6 HOURS PRN
Status: DISCONTINUED | OUTPATIENT
Start: 2025-05-15 | End: 2025-05-20 | Stop reason: HOSPADM

## 2025-05-15 RX ORDER — SODIUM CHLORIDE 0.9 % (FLUSH) 0.9 %
5-40 SYRINGE (ML) INJECTION PRN
Status: DISCONTINUED | OUTPATIENT
Start: 2025-05-15 | End: 2025-05-20 | Stop reason: HOSPADM

## 2025-05-15 RX ORDER — OXYCODONE HYDROCHLORIDE 10 MG/1
10 TABLET ORAL EVERY 4 HOURS PRN
Refills: 0 | Status: DISCONTINUED | OUTPATIENT
Start: 2025-05-15 | End: 2025-05-17 | Stop reason: SDUPTHER

## 2025-05-15 RX ORDER — PANTOPRAZOLE SODIUM 40 MG/1
40 TABLET, DELAYED RELEASE ORAL
Status: DISCONTINUED | OUTPATIENT
Start: 2025-05-16 | End: 2025-05-20 | Stop reason: HOSPADM

## 2025-05-15 RX ORDER — SODIUM CHLORIDE 9 MG/ML
INJECTION, SOLUTION INTRAVENOUS PRN
Status: DISCONTINUED | OUTPATIENT
Start: 2025-05-15 | End: 2025-05-20 | Stop reason: HOSPADM

## 2025-05-15 RX ORDER — POTASSIUM CHLORIDE 7.45 MG/ML
10 INJECTION INTRAVENOUS PRN
Status: DISCONTINUED | OUTPATIENT
Start: 2025-05-15 | End: 2025-05-20 | Stop reason: HOSPADM

## 2025-05-15 RX ORDER — CELECOXIB 200 MG/1
200 CAPSULE ORAL DAILY
Status: DISCONTINUED | OUTPATIENT
Start: 2025-05-15 | End: 2025-05-20 | Stop reason: HOSPADM

## 2025-05-15 RX ORDER — HYDROCHLOROTHIAZIDE 25 MG/1
12.5 TABLET ORAL DAILY
Status: DISCONTINUED | OUTPATIENT
Start: 2025-05-15 | End: 2025-05-20 | Stop reason: HOSPADM

## 2025-05-15 RX ORDER — POLYETHYLENE GLYCOL 3350 17 G/17G
17 POWDER, FOR SOLUTION ORAL DAILY PRN
Status: DISCONTINUED | OUTPATIENT
Start: 2025-05-15 | End: 2025-05-20 | Stop reason: HOSPADM

## 2025-05-15 RX ORDER — LEVETIRACETAM 500 MG/1
500 TABLET ORAL 2 TIMES DAILY
Status: DISCONTINUED | OUTPATIENT
Start: 2025-05-15 | End: 2025-05-20 | Stop reason: HOSPADM

## 2025-05-15 RX ORDER — ONDANSETRON 2 MG/ML
4 INJECTION INTRAMUSCULAR; INTRAVENOUS EVERY 6 HOURS PRN
Status: DISCONTINUED | OUTPATIENT
Start: 2025-05-15 | End: 2025-05-20 | Stop reason: HOSPADM

## 2025-05-15 RX ORDER — ACETAMINOPHEN 325 MG/1
650 TABLET ORAL EVERY 6 HOURS PRN
Status: DISCONTINUED | OUTPATIENT
Start: 2025-05-15 | End: 2025-05-20 | Stop reason: HOSPADM

## 2025-05-15 RX ORDER — AMLODIPINE BESYLATE 5 MG/1
5 TABLET ORAL DAILY
Status: DISCONTINUED | OUTPATIENT
Start: 2025-05-15 | End: 2025-05-20 | Stop reason: HOSPADM

## 2025-05-15 RX ORDER — FENTANYL CITRATE 50 UG/ML
50 INJECTION, SOLUTION INTRAMUSCULAR; INTRAVENOUS
Status: DISCONTINUED | OUTPATIENT
Start: 2025-05-15 | End: 2025-05-16

## 2025-05-15 RX ORDER — ASPIRIN 81 MG/1
81 TABLET ORAL DAILY
Status: DISCONTINUED | OUTPATIENT
Start: 2025-05-15 | End: 2025-05-17

## 2025-05-15 RX ORDER — HYDROMORPHONE HYDROCHLORIDE 1 MG/ML
0.5 INJECTION, SOLUTION INTRAMUSCULAR; INTRAVENOUS; SUBCUTANEOUS
Refills: 0 | Status: DISCONTINUED | OUTPATIENT
Start: 2025-05-15 | End: 2025-05-17 | Stop reason: SDUPTHER

## 2025-05-15 RX ORDER — ROSUVASTATIN CALCIUM 20 MG/1
20 TABLET, COATED ORAL DAILY
Status: DISCONTINUED | OUTPATIENT
Start: 2025-05-15 | End: 2025-05-20 | Stop reason: HOSPADM

## 2025-05-15 RX ORDER — POTASSIUM CHLORIDE 1500 MG/1
40 TABLET, EXTENDED RELEASE ORAL PRN
Status: DISCONTINUED | OUTPATIENT
Start: 2025-05-15 | End: 2025-05-20 | Stop reason: HOSPADM

## 2025-05-15 RX ADMIN — SODIUM CHLORIDE, PRESERVATIVE FREE 10 ML: 5 INJECTION INTRAVENOUS at 20:18

## 2025-05-15 RX ADMIN — FENTANYL CITRATE 50 MCG: 0.05 INJECTION, SOLUTION INTRAMUSCULAR; INTRAVENOUS at 16:00

## 2025-05-15 RX ADMIN — FENTANYL CITRATE 50 MCG: 0.05 INJECTION, SOLUTION INTRAMUSCULAR; INTRAVENOUS at 16:51

## 2025-05-15 RX ADMIN — HYDROMORPHONE HYDROCHLORIDE 0.5 MG: 1 INJECTION, SOLUTION INTRAMUSCULAR; INTRAVENOUS; SUBCUTANEOUS at 20:12

## 2025-05-15 RX ADMIN — FENTANYL CITRATE 50 MCG: 0.05 INJECTION, SOLUTION INTRAMUSCULAR; INTRAVENOUS at 18:42

## 2025-05-15 RX ADMIN — LEVETIRACETAM 500 MG: 500 TABLET, FILM COATED ORAL at 20:12

## 2025-05-15 ASSESSMENT — PAIN SCALES - GENERAL
PAINLEVEL_OUTOF10: 8
PAINLEVEL_OUTOF10: 7
PAINLEVEL_OUTOF10: 5
PAINLEVEL_OUTOF10: 8

## 2025-05-15 ASSESSMENT — PAIN - FUNCTIONAL ASSESSMENT: PAIN_FUNCTIONAL_ASSESSMENT: 0-10

## 2025-05-15 ASSESSMENT — PAIN DESCRIPTION - LOCATION: LOCATION: ARM;LEG

## 2025-05-15 NOTE — ED NOTES
ED TO INPATIENT SBAR HANDOFF    Patient Name: Michelle Al   : 1945  80 y.o.   Family/Caregiver Present: Yes  Code Status Order: No Order    C-SSRS: Risk of Suicide: No Risk  Sitter No  Restraints:         Situation  Chief Complaint:   Chief Complaint   Patient presents with    Fall     Pt here after a fall.  Pt landed on left side hitting face, arm and leg. Pain to leg and arm.       Patient Diagnosis: Closed comminuted intra-articular fracture of distal femur, left, initial encounter ([facility]) [S72.423O]     Brief Description of Patient's Condition: pt here after falling after getting dizzy at home. Pt has spiral left humeral fx and distal femur fx on the left. Pt A&O NPO at midnight.   Mental Status: oriented and alert  Arrived from: home    Imaging:   XR HUMERUS LEFT (MIN 2 VIEWS)   Final Result   Left humerus proximal and middiaphysis comminuted fracture.               ______________________________________    Electronically signed by: FABIOLA ANTHONY M.D.   Date:     05/15/2025   Time:    16:37       XR PELVIS (1-2 VIEWS)   Final Result   1.  Bilateral total hip arthroplasties.   2.  Degenerative changes of the lower lumbar spine and sacroiliac joints.   3.  Prior lumbar spine surgery.   4.  Otherwise unremarkable frontal image of the pelvis.                   ______________________________________    Electronically signed by: JESSE SAMUEL M.D.   Date:     05/15/2025   Time:    16:37       XR FEMUR LEFT (MIN 2 VIEWS)   Final Result   Comminuted fracture distal femur just above the femoral prosthesis of the left knee arthroplasty           ______________________________________    Electronically signed by: MICHELLE BOWMAN M.D.   Date:     05/15/2025   Time:    16:34       XR CHEST PORTABLE   Final Result   1.  No acute cardiopulmonary disease.               ______________________________________    Electronically signed by: USMAN BAUTISTA M.D.   Date:     05/15/2025   Time:    16:37       CT HEAD WO CONTRAST

## 2025-05-15 NOTE — H&P
[facility]      Hospitalist - History & Physical      PCP: Gonzalo Rodriguez MD    Date of Admission: 5/15/2025    Date of Service: 5/15/2025    Chief Complaint:  Fall    History Of Present Illness:   The patient is a 80 y.o. female with GERD, HTN, seizures, vertigo comes to ED for evaluation following a fall at home.  Patient states that she has been having problems with dizziness at home and is being worked up outpatient for vertigo.  Patient became dizzy today and fell landing on her left side hitting her face, arm, and leg.  Patient does have a abrasion to her left forehead. Denies fever, chills, nausea, vomiting, abdominal pain, shortness of breath, and chest pain.     In ED: XR left humerus with proximal and mid diaphysis commuted fractures; XR left femur with commuted fracture distal femur just above the femoral prosthesis left knee arthroplasty; CXR with no acute cardiopulmonary disease; CT cervical spine with no acute fracture or traumatic malalignment; CT head with no acute intracranial abnormality; WBC 9.9, H&H 10.8/32.9, CMP unremarkable.  Patient will be admitted inpatient to hospitalist with consult to orthopedic surgery.    Past Medical History:        Diagnosis Date    Chronic GERD     CVA (cerebral vascular accident) (HCC)     Diverticulitis     Hypertension     Seizures (HCC)        Past Surgical History:        Procedure Laterality Date    APPENDECTOMY      BACK SURGERY      L T     GALLBLADDER SURGERY      TOTAL KNEE ARTHROPLASTY Bilateral     TUBAL LIGATION         Home Medications:  Prior to Admission medications    Medication Sig Start Date End Date Taking? Authorizing Provider   Multiple Vitamins-Minerals (ICAPS AREDS 2 PO) Take by mouth    Nitish Garcia MD   azelastine (ASTEPRO) 137 MCG/SPRAY nasal spray 2 sprays by Nasal route daily 11/5/24   Nitish Garcia MD   celecoxib (CELEBREX) 200 MG capsule Take 1 capsule by mouth daily 11/8/24   Nitish Garcia MD

## 2025-05-16 ENCOUNTER — ANESTHESIA EVENT (OUTPATIENT)
Dept: OPERATING ROOM | Age: 80
End: 2025-05-16
Payer: MEDICARE

## 2025-05-16 ENCOUNTER — ANESTHESIA (OUTPATIENT)
Dept: OPERATING ROOM | Age: 80
End: 2025-05-16
Payer: MEDICARE

## 2025-05-16 ENCOUNTER — APPOINTMENT (OUTPATIENT)
Dept: GENERAL RADIOLOGY | Age: 80
DRG: 481 | End: 2025-05-16
Payer: MEDICARE
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
## 2025-05-16 PROBLEM — S72.352A CLOSED DISPLACED COMMINUTED FRACTURE OF SHAFT OF LEFT FEMUR (HCC): Status: ACTIVE | Noted: 2025-05-15

## 2025-05-16 LAB
ABO/RH: NORMAL
ANION GAP SERPL CALCULATED.3IONS-SCNC: 10 MMOL/L (ref 8–16)
ANTIBODY SCREEN: NORMAL
BASOPHILS # BLD: 0 K/UL (ref 0–0.2)
BASOPHILS NFR BLD: 0.1 % (ref 0–1)
BUN SERPL-MCNC: 20 MG/DL (ref 8–23)
CALCIUM SERPL-MCNC: 8.6 MG/DL (ref 8.8–10.2)
CHLORIDE SERPL-SCNC: 100 MMOL/L (ref 98–107)
CO2 SERPL-SCNC: 26 MMOL/L (ref 22–29)
CREAT SERPL-MCNC: 0.6 MG/DL (ref 0.5–0.9)
EOSINOPHIL # BLD: 0 K/UL (ref 0–0.6)
EOSINOPHIL NFR BLD: 0.1 % (ref 0–5)
ERYTHROCYTE [DISTWIDTH] IN BLOOD BY AUTOMATED COUNT: 12.9 % (ref 11.5–14.5)
GLUCOSE BLD-MCNC: 263 MG/DL (ref 70–99)
GLUCOSE SERPL-MCNC: 315 MG/DL (ref 70–99)
HCT VFR BLD AUTO: 28.6 % (ref 37–47)
HGB BLD-MCNC: 9.1 G/DL (ref 12–16)
IMM GRANULOCYTES # BLD: 0 K/UL
LYMPHOCYTES # BLD: 0.8 K/UL (ref 1.1–4.5)
LYMPHOCYTES NFR BLD: 8.2 % (ref 20–40)
MCH RBC QN AUTO: 30.3 PG (ref 27–31)
MCHC RBC AUTO-ENTMCNC: 31.8 G/DL (ref 33–37)
MCV RBC AUTO: 95.3 FL (ref 81–99)
MONOCYTES # BLD: 1 K/UL (ref 0–0.9)
MONOCYTES NFR BLD: 10.6 % (ref 0–10)
NEUTROPHILS # BLD: 7.4 K/UL (ref 1.5–7.5)
NEUTS SEG NFR BLD: 80.6 % (ref 50–65)
PERFORMED ON: ABNORMAL
PLATELET # BLD AUTO: 280 K/UL (ref 130–400)
PMV BLD AUTO: 9.7 FL (ref 9.4–12.3)
POTASSIUM SERPL-SCNC: 4.3 MMOL/L (ref 3.5–5)
RBC # BLD AUTO: 3 M/UL (ref 4.2–5.4)
SODIUM SERPL-SCNC: 136 MMOL/L (ref 136–145)
WBC # BLD AUTO: 9.2 K/UL (ref 4.8–10.8)

## 2025-05-16 PROCEDURE — P9045 ALBUMIN (HUMAN), 5%, 250 ML: HCPCS | Performed by: NURSE ANESTHETIST, CERTIFIED REGISTERED

## 2025-05-16 PROCEDURE — 7100000000 HC PACU RECOVERY - FIRST 15 MIN: Performed by: ORTHOPAEDIC SURGERY

## 2025-05-16 PROCEDURE — 2709999900 HC NON-CHARGEABLE SUPPLY: Performed by: ORTHOPAEDIC SURGERY

## 2025-05-16 PROCEDURE — 3700000000 HC ANESTHESIA ATTENDED CARE: Performed by: ORTHOPAEDIC SURGERY

## 2025-05-16 PROCEDURE — 6360000002 HC RX W HCPCS: Performed by: NURSE ANESTHETIST, CERTIFIED REGISTERED

## 2025-05-16 PROCEDURE — P9016 RBC LEUKOCYTES REDUCED: HCPCS

## 2025-05-16 PROCEDURE — 94760 N-INVAS EAR/PLS OXIMETRY 1: CPT

## 2025-05-16 PROCEDURE — 3600000004 HC SURGERY LEVEL 4 BASE: Performed by: ORTHOPAEDIC SURGERY

## 2025-05-16 PROCEDURE — 3700000001 HC ADD 15 MINUTES (ANESTHESIA): Performed by: ORTHOPAEDIC SURGERY

## 2025-05-16 PROCEDURE — 86923 COMPATIBILITY TEST ELECTRIC: CPT

## 2025-05-16 PROCEDURE — 6370000000 HC RX 637 (ALT 250 FOR IP)

## 2025-05-16 PROCEDURE — 1200000000 HC SEMI PRIVATE

## 2025-05-16 PROCEDURE — 6360000002 HC RX W HCPCS: Performed by: ORTHOPAEDIC SURGERY

## 2025-05-16 PROCEDURE — 73552 X-RAY EXAM OF FEMUR 2/>: CPT

## 2025-05-16 PROCEDURE — 36415 COLL VENOUS BLD VENIPUNCTURE: CPT

## 2025-05-16 PROCEDURE — 7100000001 HC PACU RECOVERY - ADDTL 15 MIN: Performed by: ORTHOPAEDIC SURGERY

## 2025-05-16 PROCEDURE — 93005 ELECTROCARDIOGRAM TRACING: CPT | Performed by: ANESTHESIOLOGY

## 2025-05-16 PROCEDURE — 85025 COMPLETE CBC W/AUTO DIFF WBC: CPT

## 2025-05-16 PROCEDURE — 0QSC04Z REPOSITION LEFT LOWER FEMUR WITH INTERNAL FIXATION DEVICE, OPEN APPROACH: ICD-10-PCS | Performed by: ORTHOPAEDIC SURGERY

## 2025-05-16 PROCEDURE — 86850 RBC ANTIBODY SCREEN: CPT

## 2025-05-16 PROCEDURE — 2580000003 HC RX 258: Performed by: NURSE ANESTHETIST, CERTIFIED REGISTERED

## 2025-05-16 PROCEDURE — 2500000003 HC RX 250 WO HCPCS: Performed by: NURSE ANESTHETIST, CERTIFIED REGISTERED

## 2025-05-16 PROCEDURE — C1769 GUIDE WIRE: HCPCS | Performed by: ORTHOPAEDIC SURGERY

## 2025-05-16 PROCEDURE — 86901 BLOOD TYPING SEROLOGIC RH(D): CPT

## 2025-05-16 PROCEDURE — C1713 ANCHOR/SCREW BN/BN,TIS/BN: HCPCS | Performed by: ORTHOPAEDIC SURGERY

## 2025-05-16 PROCEDURE — 80048 BASIC METABOLIC PNL TOTAL CA: CPT

## 2025-05-16 PROCEDURE — 2720000010 HC SURG SUPPLY STERILE: Performed by: ORTHOPAEDIC SURGERY

## 2025-05-16 PROCEDURE — 3600000014 HC SURGERY LEVEL 4 ADDTL 15MIN: Performed by: ORTHOPAEDIC SURGERY

## 2025-05-16 PROCEDURE — 6360000002 HC RX W HCPCS

## 2025-05-16 PROCEDURE — 86900 BLOOD TYPING SEROLOGIC ABO: CPT

## 2025-05-16 PROCEDURE — 2700000000 HC OXYGEN THERAPY PER DAY

## 2025-05-16 DEVICE — SCREW VA LCK OPTILINK SLF TP 5.0X36MM: Type: IMPLANTABLE DEVICE | Site: FEMUR | Status: FUNCTIONAL

## 2025-05-16 DEVICE — SCREW VA LCK OPTILINK SLF TP 5.0X38MM: Type: IMPLANTABLE DEVICE | Site: FEMUR | Status: FUNCTIONAL

## 2025-05-16 DEVICE — IMPLANTABLE DEVICE: Type: IMPLANTABLE DEVICE | Site: FEMUR | Status: FUNCTIONAL

## 2025-05-16 DEVICE — PLATE BNE L336MM 16 H L CNDYL S STL CRV VAR ANG LOK COMPR: Type: IMPLANTABLE DEVICE | Site: FEMUR | Status: FUNCTIONAL

## 2025-05-16 RX ORDER — HYDRALAZINE HYDROCHLORIDE 20 MG/ML
10 INJECTION INTRAMUSCULAR; INTRAVENOUS
Status: DISCONTINUED | OUTPATIENT
Start: 2025-05-16 | End: 2025-05-17 | Stop reason: HOSPADM

## 2025-05-16 RX ORDER — ONDANSETRON 2 MG/ML
INJECTION INTRAMUSCULAR; INTRAVENOUS
Status: DISCONTINUED | OUTPATIENT
Start: 2025-05-16 | End: 2025-05-16 | Stop reason: SDUPTHER

## 2025-05-16 RX ORDER — HYDROMORPHONE HYDROCHLORIDE 1 MG/ML
0.25 INJECTION, SOLUTION INTRAMUSCULAR; INTRAVENOUS; SUBCUTANEOUS EVERY 5 MIN PRN
Status: DISCONTINUED | OUTPATIENT
Start: 2025-05-16 | End: 2025-05-17 | Stop reason: HOSPADM

## 2025-05-16 RX ORDER — SODIUM CHLORIDE 9 MG/ML
INJECTION, SOLUTION INTRAVENOUS PRN
Status: DISCONTINUED | OUTPATIENT
Start: 2025-05-16 | End: 2025-05-17 | Stop reason: HOSPADM

## 2025-05-16 RX ORDER — SODIUM CHLORIDE 0.9 % (FLUSH) 0.9 %
5-40 SYRINGE (ML) INJECTION EVERY 12 HOURS SCHEDULED
Status: DISCONTINUED | OUTPATIENT
Start: 2025-05-16 | End: 2025-05-17 | Stop reason: HOSPADM

## 2025-05-16 RX ORDER — METOCLOPRAMIDE HYDROCHLORIDE 5 MG/ML
10 INJECTION INTRAMUSCULAR; INTRAVENOUS
Status: DISCONTINUED | OUTPATIENT
Start: 2025-05-16 | End: 2025-05-17 | Stop reason: HOSPADM

## 2025-05-16 RX ORDER — PROPOFOL 10 MG/ML
INJECTION, EMULSION INTRAVENOUS
Status: DISCONTINUED | OUTPATIENT
Start: 2025-05-16 | End: 2025-05-16 | Stop reason: SDUPTHER

## 2025-05-16 RX ORDER — ALBUMIN HUMAN 50 G/1000ML
SOLUTION INTRAVENOUS
Status: DISCONTINUED | OUTPATIENT
Start: 2025-05-16 | End: 2025-05-16 | Stop reason: SDUPTHER

## 2025-05-16 RX ORDER — TRANEXAMIC ACID 100 MG/ML
INJECTION, SOLUTION INTRAVENOUS
Status: DISCONTINUED | OUTPATIENT
Start: 2025-05-16 | End: 2025-05-16 | Stop reason: SDUPTHER

## 2025-05-16 RX ORDER — SODIUM CHLORIDE 0.9 % (FLUSH) 0.9 %
5-40 SYRINGE (ML) INJECTION PRN
Status: DISCONTINUED | OUTPATIENT
Start: 2025-05-16 | End: 2025-05-17 | Stop reason: HOSPADM

## 2025-05-16 RX ORDER — SODIUM CHLORIDE, SODIUM LACTATE, POTASSIUM CHLORIDE, CALCIUM CHLORIDE 600; 310; 30; 20 MG/100ML; MG/100ML; MG/100ML; MG/100ML
INJECTION, SOLUTION INTRAVENOUS
Status: DISCONTINUED | OUTPATIENT
Start: 2025-05-16 | End: 2025-05-16 | Stop reason: SDUPTHER

## 2025-05-16 RX ORDER — LIDOCAINE HYDROCHLORIDE 10 MG/ML
INJECTION, SOLUTION EPIDURAL; INFILTRATION; INTRACAUDAL; PERINEURAL
Status: DISCONTINUED | OUTPATIENT
Start: 2025-05-16 | End: 2025-05-16 | Stop reason: SDUPTHER

## 2025-05-16 RX ORDER — DIPHENHYDRAMINE HYDROCHLORIDE 50 MG/ML
12.5 INJECTION, SOLUTION INTRAMUSCULAR; INTRAVENOUS
Status: DISCONTINUED | OUTPATIENT
Start: 2025-05-16 | End: 2025-05-17 | Stop reason: HOSPADM

## 2025-05-16 RX ORDER — LABETALOL HYDROCHLORIDE 5 MG/ML
10 INJECTION, SOLUTION INTRAVENOUS
Status: DISCONTINUED | OUTPATIENT
Start: 2025-05-16 | End: 2025-05-17 | Stop reason: HOSPADM

## 2025-05-16 RX ORDER — NALOXONE HYDROCHLORIDE 0.4 MG/ML
INJECTION, SOLUTION INTRAMUSCULAR; INTRAVENOUS; SUBCUTANEOUS PRN
Status: DISCONTINUED | OUTPATIENT
Start: 2025-05-16 | End: 2025-05-17 | Stop reason: HOSPADM

## 2025-05-16 RX ORDER — VANCOMYCIN 1 G/200ML
1000 INJECTION, SOLUTION INTRAVENOUS ONCE
Status: COMPLETED | OUTPATIENT
Start: 2025-05-16 | End: 2025-05-16

## 2025-05-16 RX ORDER — FENTANYL CITRATE 50 UG/ML
INJECTION, SOLUTION INTRAMUSCULAR; INTRAVENOUS
Status: DISCONTINUED | OUTPATIENT
Start: 2025-05-16 | End: 2025-05-16 | Stop reason: SDUPTHER

## 2025-05-16 RX ORDER — ROCURONIUM BROMIDE 10 MG/ML
INJECTION, SOLUTION INTRAVENOUS
Status: DISCONTINUED | OUTPATIENT
Start: 2025-05-16 | End: 2025-05-16 | Stop reason: SDUPTHER

## 2025-05-16 RX ORDER — DEXAMETHASONE SODIUM PHOSPHATE 10 MG/ML
INJECTION, SOLUTION INTRAMUSCULAR; INTRAVENOUS
Status: DISCONTINUED | OUTPATIENT
Start: 2025-05-16 | End: 2025-05-16 | Stop reason: SDUPTHER

## 2025-05-16 RX ORDER — HYDROMORPHONE HYDROCHLORIDE 1 MG/ML
0.5 INJECTION, SOLUTION INTRAMUSCULAR; INTRAVENOUS; SUBCUTANEOUS EVERY 5 MIN PRN
Status: DISCONTINUED | OUTPATIENT
Start: 2025-05-16 | End: 2025-05-17 | Stop reason: HOSPADM

## 2025-05-16 RX ORDER — IPRATROPIUM BROMIDE AND ALBUTEROL SULFATE 2.5; .5 MG/3ML; MG/3ML
1 SOLUTION RESPIRATORY (INHALATION)
Status: DISCONTINUED | OUTPATIENT
Start: 2025-05-16 | End: 2025-05-17 | Stop reason: HOSPADM

## 2025-05-16 RX ADMIN — LIDOCAINE HYDROCHLORIDE 50 MG: 10 INJECTION, SOLUTION EPIDURAL; INFILTRATION; INTRACAUDAL; PERINEURAL at 20:36

## 2025-05-16 RX ADMIN — CELECOXIB 200 MG: 200 CAPSULE ORAL at 08:21

## 2025-05-16 RX ADMIN — VANCOMYCIN 1000 MG: 1 INJECTION, SOLUTION INTRAVENOUS at 19:21

## 2025-05-16 RX ADMIN — PHENYLEPHRINE HYDROCHLORIDE 80 MCG: 10 INJECTION INTRAVENOUS at 20:54

## 2025-05-16 RX ADMIN — ASPIRIN 81 MG: 81 TABLET, COATED ORAL at 08:37

## 2025-05-16 RX ADMIN — SODIUM CHLORIDE, SODIUM LACTATE, POTASSIUM CHLORIDE, AND CALCIUM CHLORIDE: 600; 310; 30; 20 INJECTION, SOLUTION INTRAVENOUS at 20:31

## 2025-05-16 RX ADMIN — ONDANSETRON 4 MG: 2 INJECTION INTRAMUSCULAR; INTRAVENOUS at 20:50

## 2025-05-16 RX ADMIN — HYDROMORPHONE HYDROCHLORIDE 1 MG: 1 INJECTION, SOLUTION INTRAMUSCULAR; INTRAVENOUS; SUBCUTANEOUS at 22:36

## 2025-05-16 RX ADMIN — LEVETIRACETAM 500 MG: 500 TABLET, FILM COATED ORAL at 08:22

## 2025-05-16 RX ADMIN — AMLODIPINE BESYLATE 5 MG: 5 TABLET ORAL at 08:21

## 2025-05-16 RX ADMIN — HYDROMORPHONE HYDROCHLORIDE 0.5 MG: 1 INJECTION, SOLUTION INTRAMUSCULAR; INTRAVENOUS; SUBCUTANEOUS at 00:17

## 2025-05-16 RX ADMIN — HYDROCHLOROTHIAZIDE 12.5 MG: 25 TABLET ORAL at 08:31

## 2025-05-16 RX ADMIN — PHENYLEPHRINE HYDROCHLORIDE 160 MCG: 10 INJECTION INTRAVENOUS at 21:43

## 2025-05-16 RX ADMIN — ROCURONIUM BROMIDE 50 MG: 10 INJECTION, SOLUTION INTRAVENOUS at 20:36

## 2025-05-16 RX ADMIN — ROCURONIUM BROMIDE 50 MG: 10 INJECTION, SOLUTION INTRAVENOUS at 21:15

## 2025-05-16 RX ADMIN — ROSUVASTATIN CALCIUM 20 MG: 20 TABLET, FILM COATED ORAL at 08:23

## 2025-05-16 RX ADMIN — HYDROMORPHONE HYDROCHLORIDE 0.5 MG: 1 INJECTION, SOLUTION INTRAMUSCULAR; INTRAVENOUS; SUBCUTANEOUS at 08:42

## 2025-05-16 RX ADMIN — PROPOFOL 50 MG: 10 INJECTION, EMULSION INTRAVENOUS at 20:38

## 2025-05-16 RX ADMIN — PHENYLEPHRINE HYDROCHLORIDE 80 MCG: 10 INJECTION INTRAVENOUS at 20:45

## 2025-05-16 RX ADMIN — PANTOPRAZOLE SODIUM 40 MG: 40 TABLET, DELAYED RELEASE ORAL at 11:08

## 2025-05-16 RX ADMIN — PHENYLEPHRINE HYDROCHLORIDE 160 MCG: 10 INJECTION INTRAVENOUS at 21:21

## 2025-05-16 RX ADMIN — PHENYLEPHRINE HYDROCHLORIDE 160 MCG: 10 INJECTION INTRAVENOUS at 21:53

## 2025-05-16 RX ADMIN — SUGAMMADEX 250 MG: 100 INJECTION, SOLUTION INTRAVENOUS at 23:08

## 2025-05-16 RX ADMIN — TRANEXAMIC ACID 1000 MG: 1 INJECTION, SOLUTION INTRAVENOUS at 21:28

## 2025-05-16 RX ADMIN — ALBUMIN (HUMAN) 12.5 G: 12.5 INJECTION, SOLUTION INTRAVENOUS at 22:34

## 2025-05-16 RX ADMIN — PROPOFOL 150 MG: 10 INJECTION, EMULSION INTRAVENOUS at 20:36

## 2025-05-16 RX ADMIN — LOSARTAN POTASSIUM 100 MG: 100 TABLET, FILM COATED ORAL at 08:23

## 2025-05-16 RX ADMIN — DEXAMETHASONE SODIUM PHOSPHATE 10 MG: 10 INJECTION, SOLUTION INTRAMUSCULAR; INTRAVENOUS at 20:50

## 2025-05-16 RX ADMIN — FENTANYL CITRATE 100 MCG: 0.05 INJECTION, SOLUTION INTRAMUSCULAR; INTRAVENOUS at 20:36

## 2025-05-16 RX ADMIN — TRANEXAMIC ACID 1000 MG: 1 INJECTION, SOLUTION INTRAVENOUS at 22:29

## 2025-05-16 RX ADMIN — PHENYLEPHRINE HYDROCHLORIDE 80 MCG: 10 INJECTION INTRAVENOUS at 21:04

## 2025-05-16 RX ADMIN — ALBUMIN (HUMAN) 12.5 G: 12.5 INJECTION, SOLUTION INTRAVENOUS at 21:44

## 2025-05-16 RX ADMIN — HYDROMORPHONE HYDROCHLORIDE 0.5 MG: 1 INJECTION, SOLUTION INTRAMUSCULAR; INTRAVENOUS; SUBCUTANEOUS at 04:27

## 2025-05-16 ASSESSMENT — PAIN DESCRIPTION - DESCRIPTORS: DESCRIPTORS: ACHING

## 2025-05-16 ASSESSMENT — PAIN - FUNCTIONAL ASSESSMENT: PAIN_FUNCTIONAL_ASSESSMENT: PREVENTS OR INTERFERES SOME ACTIVE ACTIVITIES AND ADLS

## 2025-05-16 ASSESSMENT — PAIN SCALES - GENERAL
PAINLEVEL_OUTOF10: 7
PAINLEVEL_OUTOF10: 5
PAINLEVEL_OUTOF10: 7
PAINLEVEL_OUTOF10: 5

## 2025-05-16 ASSESSMENT — PAIN DESCRIPTION - ORIENTATION: ORIENTATION: LEFT

## 2025-05-16 ASSESSMENT — PAIN DESCRIPTION - LOCATION: LOCATION: ARM

## 2025-05-16 ASSESSMENT — LIFESTYLE VARIABLES: SMOKING_STATUS: 0

## 2025-05-16 NOTE — ANESTHESIA PRE PROCEDURE
Department of Anesthesiology  Preprocedure Note       Name:  Michelle Al   Age:  80 y.o.  :  1945                                          MRN:  [id number]         Date:  2025      Surgeon: Surgeon(s):  Krishna Ibrahim MD    Procedure: Procedure(s):  LEFT FEMUR OPEN REDUCTION INTERNAL FIXATION    Medications prior to admission:   Prior to Admission medications    Medication Sig Start Date End Date Taking? Authorizing Provider   celecoxib (CELEBREX) 200 MG capsule Take 1 capsule by mouth daily 24  Yes Nitish Garcia MD   levETIRAcetam (KEPPRA) 500 MG tablet Take 1 tablet by mouth 2 times daily 2/10/25  Yes Sanjay Moreira DO   ELIQUIS 2.5 MG TABS tablet Take 1 tablet by mouth 2 times daily 2/10/25  Yes Sanjay Moreira DO   fluticasone (FLONASE) 50 MCG/ACT nasal spray 1 spray by Each Nostril route daily   Yes Nitish Garcia MD   amLODIPine (NORVASC) 5 MG tablet Take 1 tablet by mouth daily 22  Yes Nitish Garcia MD   aspirin 81 MG EC tablet Take 1 tablet by mouth daily   Yes Nitish Garcia MD   hydroCHLOROthiazide (HYDRODIURIL) 12.5 MG tablet Take 1 tablet by mouth daily   Yes Nitish Garcia MD   losartan (COZAAR) 100 MG tablet Take 1 tablet by mouth daily 22  Yes Nitish Garcia MD   omeprazole (PRILOSEC) 20 MG delayed release capsule Take 1 capsule by mouth as needed   Yes Nitish Garcia MD   rosuvastatin (CRESTOR) 20 MG tablet Take 1 tablet by mouth daily 22  Yes Nitish Garcia MD   Multiple Vitamins-Minerals (ICAPS AREDS 2 PO) Take by mouth    Nitish Garcia MD   azelastine (ASTEPRO) 137 MCG/SPRAY nasal spray 2 sprays by Nasal route daily 24   Nitish Garcia MD   diclofenac (FLECTOR) 1.3 % PTCH patch Place 1 patch onto the skin 2 times daily 24   Nitish Garcia MD   Multiple Vitamins-Minerals (OCUVITE PRESERVISION PO) Take by mouth    Nitish Garcia MD   ONETOUCH ULTRA strip AS DIRECTED ONCE

## 2025-05-17 ENCOUNTER — APPOINTMENT (OUTPATIENT)
Dept: MRI IMAGING | Age: 80
DRG: 481 | End: 2025-05-17
Payer: MEDICARE

## 2025-05-17 PROBLEM — R42 VERTIGO: Status: ACTIVE | Noted: 2025-05-17

## 2025-05-17 LAB
ANION GAP SERPL CALCULATED.3IONS-SCNC: 12 MMOL/L (ref 8–16)
BASOPHILS # BLD: 0 K/UL (ref 0–0.2)
BASOPHILS NFR BLD: 0.1 % (ref 0–1)
BLOOD BANK DISPENSE STATUS: NORMAL
BLOOD BANK PRODUCT CODE: NORMAL
BPU ID: NORMAL
BUN SERPL-MCNC: 32 MG/DL (ref 8–23)
CALCIUM SERPL-MCNC: 8.1 MG/DL (ref 8.8–10.2)
CHLORIDE SERPL-SCNC: 103 MMOL/L (ref 98–107)
CO2 SERPL-SCNC: 22 MMOL/L (ref 22–29)
CREAT SERPL-MCNC: 0.7 MG/DL (ref 0.5–0.9)
DESCRIPTION BLOOD BANK: NORMAL
EKG P AXIS: 77 DEGREES
EKG P-R INTERVAL: 166 MS
EKG Q-T INTERVAL: 350 MS
EKG QRS DURATION: 88 MS
EKG QTC CALCULATION (BAZETT): 400 MS
EKG T AXIS: 9 DEGREES
EOSINOPHIL # BLD: 0 K/UL (ref 0–0.6)
EOSINOPHIL NFR BLD: 0 % (ref 0–5)
ERYTHROCYTE [DISTWIDTH] IN BLOOD BY AUTOMATED COUNT: 13.1 % (ref 11.5–14.5)
GLUCOSE BLD-MCNC: 249 MG/DL (ref 70–99)
GLUCOSE BLD-MCNC: 257 MG/DL (ref 70–99)
GLUCOSE BLD-MCNC: 322 MG/DL (ref 70–99)
GLUCOSE BLD-MCNC: 322 MG/DL (ref 70–99)
GLUCOSE BLD-MCNC: 334 MG/DL (ref 70–99)
GLUCOSE SERPL-MCNC: 302 MG/DL (ref 70–99)
HBA1C MFR BLD: 8 % (ref 4–5.6)
HCT VFR BLD AUTO: 20.9 % (ref 37–47)
HCT VFR BLD AUTO: 22.8 % (ref 37–47)
HGB BLD-MCNC: 6.8 G/DL (ref 12–16)
HGB BLD-MCNC: 7.5 G/DL (ref 12–16)
IMM GRANULOCYTES # BLD: 0.1 K/UL
LYMPHOCYTES # BLD: 0.6 K/UL (ref 1.1–4.5)
LYMPHOCYTES NFR BLD: 5.5 % (ref 20–40)
MCH RBC QN AUTO: 30.8 PG (ref 27–31)
MCHC RBC AUTO-ENTMCNC: 32.5 G/DL (ref 33–37)
MCV RBC AUTO: 94.6 FL (ref 81–99)
MONOCYTES # BLD: 1.1 K/UL (ref 0–0.9)
MONOCYTES NFR BLD: 9.1 % (ref 0–10)
NEUTROPHILS # BLD: 9.9 K/UL (ref 1.5–7.5)
NEUTS SEG NFR BLD: 84.8 % (ref 50–65)
PERFORMED ON: ABNORMAL
PLATELET # BLD AUTO: 229 K/UL (ref 130–400)
PMV BLD AUTO: 9.9 FL (ref 9.4–12.3)
POTASSIUM SERPL-SCNC: 4.4 MMOL/L (ref 3.5–5)
RBC # BLD AUTO: 2.21 M/UL (ref 4.2–5.4)
SODIUM SERPL-SCNC: 137 MMOL/L (ref 136–145)
WBC # BLD AUTO: 11.6 K/UL (ref 4.8–10.8)

## 2025-05-17 PROCEDURE — 1200000000 HC SEMI PRIVATE

## 2025-05-17 PROCEDURE — 2500000003 HC RX 250 WO HCPCS: Performed by: ORTHOPAEDIC SURGERY

## 2025-05-17 PROCEDURE — 82962 GLUCOSE BLOOD TEST: CPT

## 2025-05-17 PROCEDURE — 70544 MR ANGIOGRAPHY HEAD W/O DYE: CPT

## 2025-05-17 PROCEDURE — 97162 PT EVAL MOD COMPLEX 30 MIN: CPT

## 2025-05-17 PROCEDURE — 85025 COMPLETE CBC W/AUTO DIFF WBC: CPT

## 2025-05-17 PROCEDURE — 97110 THERAPEUTIC EXERCISES: CPT

## 2025-05-17 PROCEDURE — 6370000000 HC RX 637 (ALT 250 FOR IP): Performed by: ORTHOPAEDIC SURGERY

## 2025-05-17 PROCEDURE — 36430 TRANSFUSION BLD/BLD COMPNT: CPT

## 2025-05-17 PROCEDURE — 36415 COLL VENOUS BLD VENIPUNCTURE: CPT

## 2025-05-17 PROCEDURE — 97530 THERAPEUTIC ACTIVITIES: CPT

## 2025-05-17 PROCEDURE — 6360000002 HC RX W HCPCS: Performed by: ORTHOPAEDIC SURGERY

## 2025-05-17 PROCEDURE — 85014 HEMATOCRIT: CPT

## 2025-05-17 PROCEDURE — 85018 HEMOGLOBIN: CPT

## 2025-05-17 PROCEDURE — 94760 N-INVAS EAR/PLS OXIMETRY 1: CPT

## 2025-05-17 PROCEDURE — 70547 MR ANGIOGRAPHY NECK W/O DYE: CPT

## 2025-05-17 PROCEDURE — 30233N1 TRANSFUSION OF NONAUTOLOGOUS RED BLOOD CELLS INTO PERIPHERAL VEIN, PERCUTANEOUS APPROACH: ICD-10-PCS | Performed by: STUDENT IN AN ORGANIZED HEALTH CARE EDUCATION/TRAINING PROGRAM

## 2025-05-17 PROCEDURE — 70551 MRI BRAIN STEM W/O DYE: CPT

## 2025-05-17 PROCEDURE — 2580000003 HC RX 258: Performed by: ORTHOPAEDIC SURGERY

## 2025-05-17 PROCEDURE — 6370000000 HC RX 637 (ALT 250 FOR IP): Performed by: HOSPITALIST

## 2025-05-17 PROCEDURE — 93010 ELECTROCARDIOGRAM REPORT: CPT | Performed by: INTERNAL MEDICINE

## 2025-05-17 PROCEDURE — 99223 1ST HOSP IP/OBS HIGH 75: CPT | Performed by: PSYCHIATRY & NEUROLOGY

## 2025-05-17 PROCEDURE — 6360000002 HC RX W HCPCS: Performed by: STUDENT IN AN ORGANIZED HEALTH CARE EDUCATION/TRAINING PROGRAM

## 2025-05-17 PROCEDURE — 83036 HEMOGLOBIN GLYCOSYLATED A1C: CPT

## 2025-05-17 PROCEDURE — 80048 BASIC METABOLIC PNL TOTAL CA: CPT

## 2025-05-17 RX ORDER — INSULIN GLARGINE 100 [IU]/ML
15 INJECTION, SOLUTION SUBCUTANEOUS DAILY
Status: DISCONTINUED | OUTPATIENT
Start: 2025-05-17 | End: 2025-05-20 | Stop reason: HOSPADM

## 2025-05-17 RX ORDER — GLUCAGON 1 MG/ML
1 KIT INJECTION PRN
Status: DISCONTINUED | OUTPATIENT
Start: 2025-05-17 | End: 2025-05-20 | Stop reason: HOSPADM

## 2025-05-17 RX ORDER — DEXTROSE MONOHYDRATE 100 MG/ML
INJECTION, SOLUTION INTRAVENOUS CONTINUOUS PRN
Status: DISCONTINUED | OUTPATIENT
Start: 2025-05-17 | End: 2025-05-20 | Stop reason: HOSPADM

## 2025-05-17 RX ORDER — HYDROMORPHONE HYDROCHLORIDE 1 MG/ML
1 INJECTION, SOLUTION INTRAMUSCULAR; INTRAVENOUS; SUBCUTANEOUS
Status: DISCONTINUED | OUTPATIENT
Start: 2025-05-17 | End: 2025-05-20 | Stop reason: HOSPADM

## 2025-05-17 RX ORDER — INSULIN LISPRO 100 [IU]/ML
0-8 INJECTION, SOLUTION INTRAVENOUS; SUBCUTANEOUS
Status: DISCONTINUED | OUTPATIENT
Start: 2025-05-17 | End: 2025-05-20 | Stop reason: HOSPADM

## 2025-05-17 RX ORDER — SODIUM CHLORIDE 9 MG/ML
INJECTION, SOLUTION INTRAVENOUS PRN
Status: DISCONTINUED | OUTPATIENT
Start: 2025-05-17 | End: 2025-05-20 | Stop reason: HOSPADM

## 2025-05-17 RX ORDER — OXYCODONE HYDROCHLORIDE 10 MG/1
10 TABLET ORAL EVERY 4 HOURS PRN
Status: DISCONTINUED | OUTPATIENT
Start: 2025-05-17 | End: 2025-05-20 | Stop reason: HOSPADM

## 2025-05-17 RX ORDER — LORAZEPAM 2 MG/ML
1 INJECTION INTRAMUSCULAR ONCE
Status: COMPLETED | OUTPATIENT
Start: 2025-05-17 | End: 2025-05-17

## 2025-05-17 RX ORDER — OXYCODONE HYDROCHLORIDE 5 MG/1
5 TABLET ORAL EVERY 4 HOURS PRN
Status: DISCONTINUED | OUTPATIENT
Start: 2025-05-17 | End: 2025-05-20 | Stop reason: HOSPADM

## 2025-05-17 RX ORDER — HYDROMORPHONE HYDROCHLORIDE 1 MG/ML
0.5 INJECTION, SOLUTION INTRAMUSCULAR; INTRAVENOUS; SUBCUTANEOUS
Status: DISCONTINUED | OUTPATIENT
Start: 2025-05-17 | End: 2025-05-20 | Stop reason: HOSPADM

## 2025-05-17 RX ORDER — HYDROMORPHONE HYDROCHLORIDE 1 MG/ML
0.25 INJECTION, SOLUTION INTRAMUSCULAR; INTRAVENOUS; SUBCUTANEOUS
Status: DISCONTINUED | OUTPATIENT
Start: 2025-05-17 | End: 2025-05-20 | Stop reason: HOSPADM

## 2025-05-17 RX ADMIN — LEVETIRACETAM 500 MG: 500 TABLET, FILM COATED ORAL at 20:05

## 2025-05-17 RX ADMIN — INSULIN LISPRO 6 UNITS: 100 INJECTION, SOLUTION INTRAVENOUS; SUBCUTANEOUS at 08:31

## 2025-05-17 RX ADMIN — LEVETIRACETAM 500 MG: 500 TABLET, FILM COATED ORAL at 08:15

## 2025-05-17 RX ADMIN — PANTOPRAZOLE SODIUM 40 MG: 40 TABLET, DELAYED RELEASE ORAL at 06:31

## 2025-05-17 RX ADMIN — VANCOMYCIN HYDROCHLORIDE 1000 MG: 1 INJECTION, POWDER, LYOPHILIZED, FOR SOLUTION INTRAVENOUS at 08:04

## 2025-05-17 RX ADMIN — SODIUM CHLORIDE, PRESERVATIVE FREE 10 ML: 5 INJECTION INTRAVENOUS at 08:16

## 2025-05-17 RX ADMIN — INSULIN GLARGINE 15 UNITS: 100 INJECTION, SOLUTION SUBCUTANEOUS at 06:31

## 2025-05-17 RX ADMIN — INSULIN LISPRO 5 UNITS: 100 INJECTION, SOLUTION INTRAVENOUS; SUBCUTANEOUS at 20:11

## 2025-05-17 RX ADMIN — HYDROMORPHONE HYDROCHLORIDE 0.25 MG: 1 INJECTION, SOLUTION INTRAMUSCULAR; INTRAVENOUS; SUBCUTANEOUS at 01:53

## 2025-05-17 RX ADMIN — HYDROMORPHONE HYDROCHLORIDE 0.25 MG: 1 INJECTION, SOLUTION INTRAMUSCULAR; INTRAVENOUS; SUBCUTANEOUS at 08:19

## 2025-05-17 RX ADMIN — HYDROMORPHONE HYDROCHLORIDE 0.25 MG: 1 INJECTION, SOLUTION INTRAMUSCULAR; INTRAVENOUS; SUBCUTANEOUS at 18:29

## 2025-05-17 RX ADMIN — VANCOMYCIN HYDROCHLORIDE 1000 MG: 1 INJECTION, POWDER, LYOPHILIZED, FOR SOLUTION INTRAVENOUS at 20:05

## 2025-05-17 RX ADMIN — CELECOXIB 200 MG: 200 CAPSULE ORAL at 08:15

## 2025-05-17 RX ADMIN — INSULIN LISPRO 6 UNITS: 100 INJECTION, SOLUTION INTRAVENOUS; SUBCUTANEOUS at 11:41

## 2025-05-17 RX ADMIN — AMLODIPINE BESYLATE 5 MG: 5 TABLET ORAL at 08:15

## 2025-05-17 RX ADMIN — Medication 1 MG: at 15:34

## 2025-05-17 RX ADMIN — ROSUVASTATIN CALCIUM 20 MG: 20 TABLET, FILM COATED ORAL at 08:15

## 2025-05-17 RX ADMIN — INSULIN LISPRO 2 UNITS: 100 INJECTION, SOLUTION INTRAVENOUS; SUBCUTANEOUS at 18:45

## 2025-05-17 ASSESSMENT — PAIN DESCRIPTION - LOCATION: LOCATION: LEG

## 2025-05-17 ASSESSMENT — PAIN SCALES - WONG BAKER: WONGBAKER_NUMERICALRESPONSE: NO HURT

## 2025-05-17 ASSESSMENT — PAIN - FUNCTIONAL ASSESSMENT: PAIN_FUNCTIONAL_ASSESSMENT: PREVENTS OR INTERFERES SOME ACTIVE ACTIVITIES AND ADLS

## 2025-05-17 ASSESSMENT — PAIN SCALES - GENERAL
PAINLEVEL_OUTOF10: 7
PAINLEVEL_OUTOF10: 6
PAINLEVEL_OUTOF10: 3

## 2025-05-17 ASSESSMENT — PAIN DESCRIPTION - DESCRIPTORS: DESCRIPTORS: DISCOMFORT

## 2025-05-17 ASSESSMENT — PAIN DESCRIPTION - ORIENTATION: ORIENTATION: LEFT

## 2025-05-17 NOTE — BRIEF OP NOTE
Brief Postoperative Note      Patient: Michelle Al  YOB: 1945  MRN: [id number]    Date of Procedure: 5/16/2025    Pre-Op Diagnosis Codes:      * Closed displaced comminuted fracture of shaft of left femur (HCC) [S72.352A]    Post-Op Diagnosis: Same       Procedure(s):  LEFT FEMUR OPEN REDUCTION INTERNAL FIXATION    Surgeon(s):  Krishna Ibrahim MD    Assistant:  First Assistant: Arturo Massey    Anesthesia: Choice    Estimated Blood Loss (mL): 500    Complications: None    Specimens:   * No specimens in log *    Implants:  Implant Name Type Inv. Item Serial No.  Lot No. LRB No. Used Action   PLATE BNE [id number] 16 H L CNDYL S STL CRV DEVANTE ANG ARLEN COMPR - [id number]  PLATE BNE L336MM 16 H L CNDYL S STL CRV DEVANTE ANG ARLEN COMPR  DEPUY SYN[employer]ES [employer]  Left 1 Implanted   SCREW VA LCK OPTILINK SLF TP 5.0X36MM - [id number]  SCREW VA LCK OPTILINK SLF TP 5.0X36MM  DEPUY SYN[employer]WD  Left 2 Implanted   SCREW VA LCK OPTILINK SLF TP 5.0X38MM - [id number]  SCREW VA LCK OPTILINK SLF TP 5.0X38MM  DEPUY SYN[employer]-WD  Left 3 Implanted   SCREW BNE LCK 5X75 MM NATHANAEL VA OPTILINK TI NS VALCP - [id number]  SCREW BNE LCK 5X75 MM NATHANAEL VA OPTILINK TI NS VALCP  DEPUY [employer]-WD  Left 1 Implanted   SCREW NATHANAEL 5.0 MARGO SCREOPTILINK SLF TP SD 80MM - [id number]  SCREW NATHANAEL 5.0 MARGO SCREOPTILINK SLF TP SD 80MM  DEPUY SYN[employer]ES [employer]-WD  Left 1 Implanted   SCREW NATHANAEL VA LCK OPTILINK  5.0X85MM - [id number]  SCREW NATHANAEL VA LCK OPTILINK  5.0X85MM  DEPUY SYNTHES [employer]-WD  Left 3 Implanted   SCREW PERIIPR VA LCK OPTILINK 5.0X14MM - [id number]  SCREW PERIIPR VA LCK OPTILINK 5.0X14MM  DEPUY SYN[employer]ES [employer]-WD  Left 1 Implanted   SCREW PERIIPR VA LCK OPTILINK 5.0X16MM - [id number]  SCREW PERIIPR VA LCK OPTILINK 5.0X16MM  DEPUY SYNTHES [employer]-WD  Left 1 Implanted   SCREW BNE LCK 5X18 MM PERIPROSTHETIC ST NS VALCP [id number] - [id number]  SCREW BNE LCK 5X18 MM PERIPROSTHETIC ST NS VALCP [id number]  [employer]WD  Left 2 Implanted

## 2025-05-17 NOTE — CONSENT
Informed Consent for Blood Component Transfusion Note    I have discussed with the patient the rationale for blood component transfusion; its benefits in treating or preventing fatigue, organ damage, or death; and its risk which includes mild transfusion reactions, rare risk of blood borne infection, or more serious but rare reactions. I have discussed the alternatives to transfusion, including the risk and consequences of not receiving transfusion. The patient had an opportunity to ask questions and had agreed to proceed with transfusion of blood components.    Electronically signed by LUIS Hodge CNP on 5/17/25 at 11:48 AM TING

## 2025-05-17 NOTE — CONSULTS
[facility] Consult        Patient:   Michelle Al  MR#:    [id number]  Account Number:                   [id number]      Room:    0522/522-01   YOB: 1945  Date of Progress Note: 5/17/2025  Time of Note                           8:53 AM  Attending Physician:  Felix Espinal MD  Consulting Physician:   Miguel Loredo M.D.        CHIEF COMPLAINT: Intermittent vertigo      HISTORY OF PRESENT ILLNESS:   This 80 y.o. female past medical history screen for stroke, hypertension and seizure is seen for evaluation of intermittent vertigo.  This has been present for a few months.  Typically these are triggered by head movements.  It will last up to 30 seconds and resolved.  There is associated nystagmus.  She indicates she did have some vestibular treatments with improvement.  Currently admitted after a fall due to vertigo with a left femur fracture which was repaired.  She also has a left commuted proximal and mid diaphysis humeral flexor which is treated nonoperatively.  She denies any associated focal weakness, facial droop, dysarthria or confusion with these episodes.  CT of the head on admission with no acute changes noted.  She has outpatient evaluation with ENT scheduled.    REVIEW OF SYSTEMS:  Constitutional - No fever or chills.  No diaphoresis or significant fatigue.  HENT -  No tinnitus or significant hearing loss.  Eyes - no sudden vision change or eye pain  Respiratory - no significant shortness of breath or cough  Cardiovascular - no chest pain No palpitations or significant leg swelling  Gastrointestinal - no abdominal swelling or pain.    Genitourinary - No difficulty urinating, dysuria  Musculoskeletal - no back pain or myalgia.  Skin - no color change or rash  Neurologic - No seizures.  No lateralizing weakness.  Hematologic - no easy bruising or excessive bleeding.  Psychiatric - no severe anxiety or nervousness.   All other review of systems are negative.      Past Medical 
infection, fat embolus. Patient was then agreeable to procedure today. She verbalized understanding of risks and benefits of ORIF. We also discussed weight-bearing status post op.     Provider: LUIS Hyman - LEANDRO  Date: 5/16/2025

## 2025-05-17 NOTE — ANESTHESIA POSTPROCEDURE EVALUATION
Department of Anesthesiology  Postprocedure Note    Patient: Michelle Al  MRN: [id number]  YOB: 1945  Date of evaluation: 5/16/2025    Procedure Summary       Date: 05/16/25 Room / Location: [address]    Anesthesia Start: 2031 Anesthesia Stop: 2329    Procedure: LEFT FEMUR OPEN REDUCTION INTERNAL FIXATION (Left) Diagnosis:       Closed displaced comminuted fracture of shaft of left femur (HCC)      (Closed displaced comminuted fracture of shaft of left femur (HCC) [S72.352A])    Surgeons: Krishna Ibrahim MD Responsible Provider: Mariel Guadarrama APRN - CRNA    Anesthesia Type: general ASA Status: 3            Anesthesia Type: No value filed.    Lauryn Phase I: Lauryn Score: 10    Lauryn Phase II:      Anesthesia Post Evaluation    Patient location during evaluation: PACU  Patient participation: complete - patient participated  Level of consciousness: awake  Pain score: 0  Airway patency: patent  Nausea & Vomiting: no nausea and no vomiting  Cardiovascular status: hemodynamically stable and blood pressure returned to baseline  Respiratory status: acceptable and face mask  Hydration status: stable  Comments: BP (!) 133/51   Pulse 90   Temp 97.6 °F (36.4 °C)   Resp 20   Ht 1.626 m (5' 4\")   Wt 92.9 kg (204 lb 11.2 oz)   SpO2 93%   BMI 35.14 kg/m²     Pain management: adequate    No notable events documented.

## 2025-05-18 LAB
ANION GAP SERPL CALCULATED.3IONS-SCNC: 12 MMOL/L (ref 8–16)
BASOPHILS # BLD: 0 K/UL (ref 0–0.2)
BASOPHILS NFR BLD: 0.1 % (ref 0–1)
BUN SERPL-MCNC: 29 MG/DL (ref 8–23)
CALCIUM SERPL-MCNC: 7.8 MG/DL (ref 8.8–10.2)
CHLORIDE SERPL-SCNC: 101 MMOL/L (ref 98–107)
CO2 SERPL-SCNC: 22 MMOL/L (ref 22–29)
CREAT SERPL-MCNC: 0.6 MG/DL (ref 0.5–0.9)
EOSINOPHIL # BLD: 0 K/UL (ref 0–0.6)
EOSINOPHIL NFR BLD: 0.2 % (ref 0–5)
ERYTHROCYTE [DISTWIDTH] IN BLOOD BY AUTOMATED COUNT: 13.7 % (ref 11.5–14.5)
GLUCOSE BLD-MCNC: 170 MG/DL (ref 70–99)
GLUCOSE BLD-MCNC: 226 MG/DL (ref 70–99)
GLUCOSE BLD-MCNC: 227 MG/DL (ref 70–99)
GLUCOSE BLD-MCNC: 250 MG/DL (ref 70–99)
GLUCOSE BLD-MCNC: 252 MG/DL (ref 70–99)
GLUCOSE SERPL-MCNC: 194 MG/DL (ref 70–99)
HCT VFR BLD AUTO: 19.4 % (ref 37–47)
HCT VFR BLD AUTO: 21.8 % (ref 37–47)
HCT VFR BLD AUTO: 22.6 % (ref 37–47)
HCT VFR BLD AUTO: 22.8 % (ref 37–47)
HGB BLD-MCNC: 6.4 G/DL (ref 12–16)
HGB BLD-MCNC: 7.1 G/DL (ref 12–16)
HGB BLD-MCNC: 7.3 G/DL (ref 12–16)
HGB BLD-MCNC: 7.6 G/DL (ref 12–16)
IMM GRANULOCYTES # BLD: 0.1 K/UL
LYMPHOCYTES # BLD: 1.2 K/UL (ref 1.1–4.5)
LYMPHOCYTES NFR BLD: 10.9 % (ref 20–40)
MCH RBC QN AUTO: 30.7 PG (ref 27–31)
MCHC RBC AUTO-ENTMCNC: 32.6 G/DL (ref 33–37)
MCV RBC AUTO: 94.4 FL (ref 81–99)
MONOCYTES # BLD: 2 K/UL (ref 0–0.9)
MONOCYTES NFR BLD: 18.2 % (ref 0–10)
NEUTROPHILS # BLD: 7.6 K/UL (ref 1.5–7.5)
NEUTS SEG NFR BLD: 70 % (ref 50–65)
PERFORMED ON: ABNORMAL
PLATELET # BLD AUTO: 212 K/UL (ref 130–400)
PMV BLD AUTO: 9.9 FL (ref 9.4–12.3)
POTASSIUM SERPL-SCNC: 4.2 MMOL/L (ref 3.5–5)
RBC # BLD AUTO: 2.31 M/UL (ref 4.2–5.4)
SODIUM SERPL-SCNC: 135 MMOL/L (ref 136–145)
WBC # BLD AUTO: 10.9 K/UL (ref 4.8–10.8)

## 2025-05-18 PROCEDURE — 36415 COLL VENOUS BLD VENIPUNCTURE: CPT

## 2025-05-18 PROCEDURE — 85025 COMPLETE CBC W/AUTO DIFF WBC: CPT

## 2025-05-18 PROCEDURE — 6370000000 HC RX 637 (ALT 250 FOR IP): Performed by: ORTHOPAEDIC SURGERY

## 2025-05-18 PROCEDURE — 97530 THERAPEUTIC ACTIVITIES: CPT

## 2025-05-18 PROCEDURE — 36430 TRANSFUSION BLD/BLD COMPNT: CPT

## 2025-05-18 PROCEDURE — 85018 HEMOGLOBIN: CPT

## 2025-05-18 PROCEDURE — 99233 SBSQ HOSP IP/OBS HIGH 50: CPT | Performed by: PSYCHIATRY & NEUROLOGY

## 2025-05-18 PROCEDURE — 51798 US URINE CAPACITY MEASURE: CPT

## 2025-05-18 PROCEDURE — 2580000003 HC RX 258: Performed by: ORTHOPAEDIC SURGERY

## 2025-05-18 PROCEDURE — 80048 BASIC METABOLIC PNL TOTAL CA: CPT

## 2025-05-18 PROCEDURE — 51701 INSERT BLADDER CATHETER: CPT

## 2025-05-18 PROCEDURE — 94760 N-INVAS EAR/PLS OXIMETRY 1: CPT

## 2025-05-18 PROCEDURE — 6370000000 HC RX 637 (ALT 250 FOR IP): Performed by: PSYCHIATRY & NEUROLOGY

## 2025-05-18 PROCEDURE — 6360000002 HC RX W HCPCS: Performed by: ORTHOPAEDIC SURGERY

## 2025-05-18 PROCEDURE — 85014 HEMATOCRIT: CPT

## 2025-05-18 PROCEDURE — 2500000003 HC RX 250 WO HCPCS: Performed by: ORTHOPAEDIC SURGERY

## 2025-05-18 PROCEDURE — 1200000000 HC SEMI PRIVATE

## 2025-05-18 PROCEDURE — 6370000000 HC RX 637 (ALT 250 FOR IP): Performed by: HOSPITALIST

## 2025-05-18 PROCEDURE — 82962 GLUCOSE BLOOD TEST: CPT

## 2025-05-18 RX ORDER — ASPIRIN 81 MG/1
81 TABLET ORAL DAILY
Status: DISCONTINUED | OUTPATIENT
Start: 2025-05-18 | End: 2025-05-20 | Stop reason: HOSPADM

## 2025-05-18 RX ORDER — SODIUM CHLORIDE 9 MG/ML
INJECTION, SOLUTION INTRAVENOUS PRN
Status: DISCONTINUED | OUTPATIENT
Start: 2025-05-18 | End: 2025-05-20 | Stop reason: HOSPADM

## 2025-05-18 RX ADMIN — INSULIN GLARGINE 15 UNITS: 100 INJECTION, SOLUTION SUBCUTANEOUS at 07:43

## 2025-05-18 RX ADMIN — LEVETIRACETAM 500 MG: 500 TABLET, FILM COATED ORAL at 19:30

## 2025-05-18 RX ADMIN — VANCOMYCIN HYDROCHLORIDE 1000 MG: 1 INJECTION, POWDER, LYOPHILIZED, FOR SOLUTION INTRAVENOUS at 19:37

## 2025-05-18 RX ADMIN — VANCOMYCIN HYDROCHLORIDE 1000 MG: 1 INJECTION, POWDER, LYOPHILIZED, FOR SOLUTION INTRAVENOUS at 07:37

## 2025-05-18 RX ADMIN — INSULIN LISPRO 4 UNITS: 100 INJECTION, SOLUTION INTRAVENOUS; SUBCUTANEOUS at 12:13

## 2025-05-18 RX ADMIN — AMLODIPINE BESYLATE 5 MG: 5 TABLET ORAL at 07:46

## 2025-05-18 RX ADMIN — INSULIN LISPRO 2 UNITS: 100 INJECTION, SOLUTION INTRAVENOUS; SUBCUTANEOUS at 07:43

## 2025-05-18 RX ADMIN — INSULIN LISPRO 4 UNITS: 100 INJECTION, SOLUTION INTRAVENOUS; SUBCUTANEOUS at 21:24

## 2025-05-18 RX ADMIN — SODIUM CHLORIDE, PRESERVATIVE FREE 10 ML: 5 INJECTION INTRAVENOUS at 07:43

## 2025-05-18 RX ADMIN — LEVETIRACETAM 500 MG: 500 TABLET, FILM COATED ORAL at 07:43

## 2025-05-18 RX ADMIN — PANTOPRAZOLE SODIUM 40 MG: 40 TABLET, DELAYED RELEASE ORAL at 05:27

## 2025-05-18 RX ADMIN — ACETAMINOPHEN 650 MG: 325 TABLET ORAL at 16:46

## 2025-05-18 RX ADMIN — ASPIRIN 81 MG: 81 TABLET, COATED ORAL at 10:04

## 2025-05-18 RX ADMIN — OXYCODONE 5 MG: 5 TABLET ORAL at 05:27

## 2025-05-18 RX ADMIN — ROSUVASTATIN CALCIUM 20 MG: 20 TABLET, FILM COATED ORAL at 07:44

## 2025-05-18 RX ADMIN — CELECOXIB 200 MG: 200 CAPSULE ORAL at 07:43

## 2025-05-18 ASSESSMENT — PAIN DESCRIPTION - LOCATION: LOCATION: LEG;ARM

## 2025-05-18 ASSESSMENT — PAIN - FUNCTIONAL ASSESSMENT: PAIN_FUNCTIONAL_ASSESSMENT: ACTIVITIES ARE NOT PREVENTED

## 2025-05-18 ASSESSMENT — PAIN DESCRIPTION - ORIENTATION: ORIENTATION: RIGHT

## 2025-05-18 ASSESSMENT — PAIN DESCRIPTION - DESCRIPTORS: DESCRIPTORS: DISCOMFORT

## 2025-05-18 ASSESSMENT — PAIN SCALES - GENERAL
PAINLEVEL_OUTOF10: 2
PAINLEVEL_OUTOF10: 0

## 2025-05-18 NOTE — CARE COORDINATION
BUDDY met with Pt and Son's (Bryan Jimenez) in the room to go over DC planning options. First choice is for Pt to go to [facility]. BUDDY explained Pt may be too low level w her current therapy level and asked for a back up option. Pt/family requested [facility] if unable to get into the [facility]. BUDDY will send the requested swing bed referral first thing Monday AM.    BUDDY left contact card and asked to be called if plans/needs change.     [facility]  Electronically signed by Tim Payne on 5/18/2025 at 10:17 AM

## 2025-05-19 LAB
ANION GAP SERPL CALCULATED.3IONS-SCNC: 8 MMOL/L (ref 8–16)
BASOPHILS # BLD: 0 K/UL (ref 0–0.2)
BASOPHILS NFR BLD: 0.2 % (ref 0–1)
BUN SERPL-MCNC: 23 MG/DL (ref 8–23)
CALCIUM SERPL-MCNC: 8.1 MG/DL (ref 8.8–10.2)
CHLORIDE SERPL-SCNC: 102 MMOL/L (ref 98–107)
CO2 SERPL-SCNC: 26 MMOL/L (ref 22–29)
CREAT SERPL-MCNC: 0.5 MG/DL (ref 0.5–0.9)
EOSINOPHIL # BLD: 0.2 K/UL (ref 0–0.6)
EOSINOPHIL NFR BLD: 2.6 % (ref 0–5)
ERYTHROCYTE [DISTWIDTH] IN BLOOD BY AUTOMATED COUNT: 14.1 % (ref 11.5–14.5)
FERRITIN SERPL-MCNC: 156 NG/ML (ref 13–150)
FOLATE SERPL-MCNC: 10.7 NG/ML (ref 4.8–37.3)
GLUCOSE BLD-MCNC: 176 MG/DL (ref 70–99)
GLUCOSE BLD-MCNC: 179 MG/DL (ref 70–99)
GLUCOSE BLD-MCNC: 187 MG/DL (ref 70–99)
GLUCOSE BLD-MCNC: 193 MG/DL (ref 70–99)
GLUCOSE BLD-MCNC: 234 MG/DL (ref 70–99)
GLUCOSE SERPL-MCNC: 161 MG/DL (ref 70–99)
HCT VFR BLD AUTO: 22.2 % (ref 37–47)
HCT VFR BLD AUTO: 22.4 % (ref 37–47)
HCT VFR BLD AUTO: 22.6 % (ref 37–47)
HCT VFR BLD AUTO: 23.8 % (ref 37–47)
HGB BLD-MCNC: 7.3 G/DL (ref 12–16)
HGB BLD-MCNC: 7.4 G/DL (ref 12–16)
HGB BLD-MCNC: 7.6 G/DL (ref 12–16)
HGB BLD-MCNC: 7.8 G/DL (ref 12–16)
IMM GRANULOCYTES # BLD: 0.1 K/UL
IRON SATN MFR SERPL: 7 % (ref 15–50)
IRON SERPL-MCNC: 13 UG/DL (ref 37–145)
LYMPHOCYTES # BLD: 1.1 K/UL (ref 1.1–4.5)
LYMPHOCYTES NFR BLD: 13.2 % (ref 20–40)
MCH RBC QN AUTO: 31.4 PG (ref 27–31)
MCHC RBC AUTO-ENTMCNC: 33.6 G/DL (ref 33–37)
MCV RBC AUTO: 93.4 FL (ref 81–99)
MONOCYTES # BLD: 1.1 K/UL (ref 0–0.9)
MONOCYTES NFR BLD: 13.7 % (ref 0–10)
NEUTROPHILS # BLD: 5.6 K/UL (ref 1.5–7.5)
NEUTS SEG NFR BLD: 69.6 % (ref 50–65)
PERFORMED ON: ABNORMAL
PLATELET # BLD AUTO: 186 K/UL (ref 130–400)
PMV BLD AUTO: 9.8 FL (ref 9.4–12.3)
POTASSIUM SERPL-SCNC: 3.9 MMOL/L (ref 3.5–5)
RBC # BLD AUTO: 2.42 M/UL (ref 4.2–5.4)
SODIUM SERPL-SCNC: 136 MMOL/L (ref 136–145)
TIBC SERPL-MCNC: 192 UG/DL (ref 250–400)
VIT B12 SERPL-MCNC: 288 PG/ML (ref 232–1245)
WBC # BLD AUTO: 8 K/UL (ref 4.8–10.8)

## 2025-05-19 PROCEDURE — 1200000000 HC SEMI PRIVATE

## 2025-05-19 PROCEDURE — 94760 N-INVAS EAR/PLS OXIMETRY 1: CPT

## 2025-05-19 PROCEDURE — 83540 ASSAY OF IRON: CPT

## 2025-05-19 PROCEDURE — 82728 ASSAY OF FERRITIN: CPT

## 2025-05-19 PROCEDURE — 6370000000 HC RX 637 (ALT 250 FOR IP): Performed by: PSYCHIATRY & NEUROLOGY

## 2025-05-19 PROCEDURE — 99233 SBSQ HOSP IP/OBS HIGH 50: CPT | Performed by: PSYCHIATRY & NEUROLOGY

## 2025-05-19 PROCEDURE — 36415 COLL VENOUS BLD VENIPUNCTURE: CPT

## 2025-05-19 PROCEDURE — 85018 HEMOGLOBIN: CPT

## 2025-05-19 PROCEDURE — 97535 SELF CARE MNGMENT TRAINING: CPT

## 2025-05-19 PROCEDURE — 6370000000 HC RX 637 (ALT 250 FOR IP): Performed by: ORTHOPAEDIC SURGERY

## 2025-05-19 PROCEDURE — 6370000000 HC RX 637 (ALT 250 FOR IP)

## 2025-05-19 PROCEDURE — 83550 IRON BINDING TEST: CPT

## 2025-05-19 PROCEDURE — 2500000003 HC RX 250 WO HCPCS: Performed by: ORTHOPAEDIC SURGERY

## 2025-05-19 PROCEDURE — 82607 VITAMIN B-12: CPT

## 2025-05-19 PROCEDURE — 80048 BASIC METABOLIC PNL TOTAL CA: CPT

## 2025-05-19 PROCEDURE — 6360000002 HC RX W HCPCS

## 2025-05-19 PROCEDURE — 97530 THERAPEUTIC ACTIVITIES: CPT

## 2025-05-19 PROCEDURE — 82962 GLUCOSE BLOOD TEST: CPT

## 2025-05-19 PROCEDURE — 97165 OT EVAL LOW COMPLEX 30 MIN: CPT

## 2025-05-19 PROCEDURE — 82746 ASSAY OF FOLIC ACID SERUM: CPT

## 2025-05-19 PROCEDURE — 6370000000 HC RX 637 (ALT 250 FOR IP): Performed by: HOSPITALIST

## 2025-05-19 PROCEDURE — 85014 HEMATOCRIT: CPT

## 2025-05-19 PROCEDURE — 85025 COMPLETE CBC W/AUTO DIFF WBC: CPT

## 2025-05-19 PROCEDURE — 2580000003 HC RX 258

## 2025-05-19 PROCEDURE — 94150 VITAL CAPACITY TEST: CPT

## 2025-05-19 RX ORDER — SENNA AND DOCUSATE SODIUM 50; 8.6 MG/1; MG/1
1 TABLET, FILM COATED ORAL 2 TIMES DAILY
Status: DISCONTINUED | OUTPATIENT
Start: 2025-05-19 | End: 2025-05-20 | Stop reason: HOSPADM

## 2025-05-19 RX ORDER — POLYETHYLENE GLYCOL 3350 17 G/17G
17 POWDER, FOR SOLUTION ORAL DAILY
Status: DISCONTINUED | OUTPATIENT
Start: 2025-05-19 | End: 2025-05-20 | Stop reason: HOSPADM

## 2025-05-19 RX ADMIN — ROSUVASTATIN CALCIUM 20 MG: 20 TABLET, FILM COATED ORAL at 09:42

## 2025-05-19 RX ADMIN — LEVETIRACETAM 500 MG: 500 TABLET, FILM COATED ORAL at 09:42

## 2025-05-19 RX ADMIN — INSULIN GLARGINE 15 UNITS: 100 INJECTION, SOLUTION SUBCUTANEOUS at 09:43

## 2025-05-19 RX ADMIN — SODIUM CHLORIDE, PRESERVATIVE FREE 10 ML: 5 INJECTION INTRAVENOUS at 09:44

## 2025-05-19 RX ADMIN — AMLODIPINE BESYLATE 5 MG: 5 TABLET ORAL at 09:42

## 2025-05-19 RX ADMIN — SENNOSIDES AND DOCUSATE SODIUM 1 TABLET: 50; 8.6 TABLET ORAL at 20:26

## 2025-05-19 RX ADMIN — PANTOPRAZOLE SODIUM 40 MG: 40 TABLET, DELAYED RELEASE ORAL at 06:09

## 2025-05-19 RX ADMIN — INSULIN LISPRO 2 UNITS: 100 INJECTION, SOLUTION INTRAVENOUS; SUBCUTANEOUS at 12:32

## 2025-05-19 RX ADMIN — POLYETHYLENE GLYCOL 3350 17 G: 17 POWDER, FOR SOLUTION ORAL at 16:12

## 2025-05-19 RX ADMIN — CELECOXIB 200 MG: 200 CAPSULE ORAL at 09:42

## 2025-05-19 RX ADMIN — ASPIRIN 81 MG: 81 TABLET, COATED ORAL at 09:42

## 2025-05-19 RX ADMIN — SODIUM CHLORIDE, PRESERVATIVE FREE 10 ML: 5 INJECTION INTRAVENOUS at 20:55

## 2025-05-19 RX ADMIN — INSULIN LISPRO 2 UNITS: 100 INJECTION, SOLUTION INTRAVENOUS; SUBCUTANEOUS at 20:29

## 2025-05-19 RX ADMIN — OXYCODONE 5 MG: 5 TABLET ORAL at 06:09

## 2025-05-19 RX ADMIN — LEVETIRACETAM 500 MG: 500 TABLET, FILM COATED ORAL at 20:26

## 2025-05-19 RX ADMIN — IRON SUCROSE 250 MG: 20 INJECTION, SOLUTION INTRAVENOUS at 14:35

## 2025-05-19 ASSESSMENT — PAIN SCALES - GENERAL: PAINLEVEL_OUTOF10: 4

## 2025-05-19 ASSESSMENT — PAIN DESCRIPTION - ORIENTATION: ORIENTATION: LEFT

## 2025-05-19 ASSESSMENT — PAIN DESCRIPTION - LOCATION: LOCATION: LEG

## 2025-05-19 ASSESSMENT — PAIN DESCRIPTION - DESCRIPTORS: DESCRIPTORS: THROBBING

## 2025-05-19 ASSESSMENT — PAIN - FUNCTIONAL ASSESSMENT: PAIN_FUNCTIONAL_ASSESSMENT: PREVENTS OR INTERFERES SOME ACTIVE ACTIVITIES AND ADLS

## 2025-05-19 NOTE — CARE COORDINATION
Pt has been denied by [facility]. Pt asked for [facility] as back up. [facility] has accepted PENDING insurance authorization.    [facility]   P  [phone] E-fax  Electronically signed by Tim Payne on 5/19/2025 at 9:25 AM

## 2025-05-19 NOTE — OP NOTE
[facility]              [address] [place] [address]                            OPERATIVE REPORT      PATIENT NAME: PEGGY BAEZA              : 1945  MED REC NO: [id number]                          ROOM: 0522  ACCOUNT NO: [id number]                       ADMIT DATE: 05/15/2025  PROVIDER: Krishna Ibrahim MD      DATE OF PROCEDURE:  2025    SURGEON:  Krishna Ibrahim MD    PREOPERATIVE DIAGNOSES:    1. Left knee comminuted supracondylar periprosthetic femur fracture.  2. Left humeral shaft comminuted fracture.    POSTOPERATIVE DIAGNOSES:    1. Left knee comminuted supracondylar periprosthetic femur fracture.  2. Left humeral shaft comminuted fracture.    PROCEDURE:    1. Open reduction and internal fixation of left supracondylar femur periprosthetic fracture.  2. Closed reduction and splinting of left humerus fracture.    FIRST ASSISTANT:  Bryson.    ANESTHESIA:  General.    EBL:  About 500 mL.    FLUIDS:  650 mL of crystalloid, 500 mL of albumin.    URINE OUTPUT:  35 mL.    COMPONENTS USED:  Synthes curved condylar periprosthetic plate, 16-hole with 3 unicortical screws proximally, 5 bicortical locking screws proximally as well, distally were 6 locking screws.    INDICATIONS:  This is a 80-year-old lady who underwent total knee replacement approximately 4 months ago by Dr. Greene and had a very successful outcome.  She slipped and suffered a very significant fall, suffered a comminuted left supracondylar periprosthetic femur fracture and a comminuted humeral shaft fracture.  Because of this, she was recommended to undergo the above procedure.    DESCRIPTION OF PROCEDURE:  After informed consent, she was given 1 g of vancomycin, 2 g of Ancef, underwent general anesthesia.  A bump was placed on her left hip, bone foam was used.  The right leg was placed in SCD.  Left leg, knee and thigh were prepped in usual sterile fashion.  Under fluoroscopy, we identified the

## 2025-05-19 NOTE — PLAN OF CARE
Problem: Safety - Adult  Goal: Free from fall injury  5/17/2025 1508 by Dian Moseley RN  Outcome: Progressing  5/17/2025 0339 by Feli Kasper RN  Outcome: Progressing  Flowsheets (Taken 5/17/2025 0015)  Free From Fall Injury: Instruct family/caregiver on patient safety     Problem: Chronic Conditions and Co-morbidities  Goal: Patient's chronic conditions and co-morbidity symptoms are monitored and maintained or improved  5/17/2025 1508 by Dian Moseley RN  Outcome: Progressing  5/17/2025 0339 by Feli Kasper RN  Outcome: Progressing  Flowsheets (Taken 5/17/2025 0015)  Care Plan - Patient's Chronic Conditions and Co-Morbidity Symptoms are Monitored and Maintained or Improved: Monitor and assess patient's chronic conditions and comorbid symptoms for stability, deterioration, or improvement     Problem: Discharge Planning  Goal: Discharge to home or other facility with appropriate resources  5/17/2025 1508 by Dian Moseley RN  Outcome: Progressing  5/17/2025 0339 by Feli Kasper RN  Outcome: Progressing  Flowsheets (Taken 5/17/2025 0015)  Discharge to home or other facility with appropriate resources: Identify barriers to discharge with patient and caregiver     Problem: Pain  Goal: Verbalizes/displays adequate comfort level or baseline comfort level  5/17/2025 1508 by Dian Moseley RN  Outcome: Progressing  5/17/2025 0339 by Feli Kasper RN  Outcome: Progressing     Problem: Skin/Tissue Integrity  Goal: Skin integrity remains intact  Description: 1.  Monitor for areas of redness and/or skin breakdown2.  Assess vascular access sites hourly3.  Every 4-6 hours minimum:  Change oxygen saturation probe site4.  Every 4-6 hours:  If on nasal continuous positive airway pressure, respiratory therapy assess nares and determine need for appliance change or resting period  Outcome: Progressing     
  Problem: Safety - Adult  Goal: Free from fall injury  Outcome: Progressing     Problem: Chronic Conditions and Co-morbidities  Goal: Patient's chronic conditions and co-morbidity symptoms are monitored and maintained or improved  Outcome: Progressing     Problem: Discharge Planning  Goal: Discharge to home or other facility with appropriate resources  Outcome: Progressing  Flowsheets (Taken 5/19/2025 7749)  Discharge to home or other facility with appropriate resources: Identify barriers to discharge with patient and caregiver     Problem: Pain  Goal: Verbalizes/displays adequate comfort level or baseline comfort level  Outcome: Progressing     Problem: Skin/Tissue Integrity  Goal: Skin integrity remains intact  Description: 1.  Monitor for areas of redness and/or skin breakdown2.  Assess vascular access sites hourly3.  Every 4-6 hours minimum:  Change oxygen saturation probe site4.  Every 4-6 hours:  If on nasal continuous positive airway pressure, respiratory therapy assess nares and determine need for appliance change or resting period  Outcome: Progressing     Problem: ABCDS Injury Assessment  Goal: Absence of physical injury  Outcome: Progressing     
SUBJECTIVE:    RN team:   patient has aniongap of 10, BMI 35, Glucose 322      OBJECTIVE:    BP (!) 126/54   Pulse 80   Temp 98.1 °F (36.7 °C) (Temporal)   Resp 18   Ht 1.626 m (5' 4\")   Wt 92.9 kg (204 lb 11.2 oz)   SpO2 92%   BMI 35.14 kg/m²       ASSESSMENTS & PLANS:    Uncontrolled DM Type 2  Lantus 15 units QAM  Medium Dose ISS  POCT TIDWM & QHS & PRN  Hypoglycemic Order Set  4 carb dietary modifier added  
Every 4-6 hours:  If on nasal continuous positive airway pressure, respiratory therapy assess nares and determine need for appliance change or resting period  5/18/2025 1006 by Dian Moseley RN  Outcome: Progressing  5/18/2025 0217 by Feli Kasper RN  Outcome: Progressing  Flowsheets  Taken 5/17/2025 1950 by Feli Kasper, RN  Skin Integrity Remains Intact: Monitor for areas of redness and/or skin breakdown  Taken 5/17/2025 1633 by Dian Moseley RN  Skin Integrity Remains Intact:   Monitor for areas of redness and/or skin breakdown   Every 4-6 hours minimum:  Change oxygen saturation probe site     Problem: ABCDS Injury Assessment  Goal: Absence of physical injury  5/18/2025 1006 by Dian Msoeley RN  Outcome: Progressing  5/18/2025 0217 by Feli Kasper, RN  Outcome: Progressing  Flowsheets (Taken 5/17/2025 1950)  Absence of Physical Injury: Implement safety measures based on patient assessment

## 2025-05-20 VITALS
HEART RATE: 84 BPM | BODY MASS INDEX: 34.95 KG/M2 | RESPIRATION RATE: 16 BRPM | WEIGHT: 204.7 LBS | DIASTOLIC BLOOD PRESSURE: 50 MMHG | TEMPERATURE: 99 F | HEIGHT: 64 IN | OXYGEN SATURATION: 93 % | SYSTOLIC BLOOD PRESSURE: 125 MMHG

## 2025-05-20 LAB
ANION GAP SERPL CALCULATED.3IONS-SCNC: 9 MMOL/L (ref 8–16)
BASOPHILS # BLD: 0 K/UL (ref 0–0.2)
BASOPHILS NFR BLD: 0.3 % (ref 0–1)
BLOOD BANK DISPENSE STATUS: NORMAL
BLOOD BANK PRODUCT CODE: NORMAL
BPU ID: NORMAL
BUN SERPL-MCNC: 16 MG/DL (ref 8–23)
CALCIUM SERPL-MCNC: 8.2 MG/DL (ref 8.8–10.2)
CHLORIDE SERPL-SCNC: 103 MMOL/L (ref 98–107)
CO2 SERPL-SCNC: 25 MMOL/L (ref 22–29)
CREAT SERPL-MCNC: 0.5 MG/DL (ref 0.5–0.9)
DESCRIPTION BLOOD BANK: NORMAL
EOSINOPHIL # BLD: 0.4 K/UL (ref 0–0.6)
EOSINOPHIL NFR BLD: 4.1 % (ref 0–5)
ERYTHROCYTE [DISTWIDTH] IN BLOOD BY AUTOMATED COUNT: 13.6 % (ref 11.5–14.5)
GLUCOSE BLD-MCNC: 190 MG/DL (ref 70–99)
GLUCOSE BLD-MCNC: 199 MG/DL (ref 70–99)
GLUCOSE BLD-MCNC: 211 MG/DL (ref 70–99)
GLUCOSE SERPL-MCNC: 166 MG/DL (ref 70–99)
HCT VFR BLD AUTO: 23.1 % (ref 37–47)
HCT VFR BLD AUTO: 23.3 % (ref 37–47)
HGB BLD-MCNC: 7.3 G/DL (ref 12–16)
HGB BLD-MCNC: 7.6 G/DL (ref 12–16)
IMM GRANULOCYTES # BLD: 0.1 K/UL
LYMPHOCYTES # BLD: 1.1 K/UL (ref 1.1–4.5)
LYMPHOCYTES NFR BLD: 12 % (ref 20–40)
MCH RBC QN AUTO: 30.5 PG (ref 27–31)
MCHC RBC AUTO-ENTMCNC: 32.9 G/DL (ref 33–37)
MCV RBC AUTO: 92.8 FL (ref 81–99)
MONOCYTES # BLD: 1.1 K/UL (ref 0–0.9)
MONOCYTES NFR BLD: 12.9 % (ref 0–10)
NEUTROPHILS # BLD: 6.2 K/UL (ref 1.5–7.5)
NEUTS SEG NFR BLD: 70 % (ref 50–65)
PERFORMED ON: ABNORMAL
PLATELET # BLD AUTO: 232 K/UL (ref 130–400)
PMV BLD AUTO: 9.5 FL (ref 9.4–12.3)
POTASSIUM SERPL-SCNC: 4.1 MMOL/L (ref 3.5–5)
RBC # BLD AUTO: 2.49 M/UL (ref 4.2–5.4)
SODIUM SERPL-SCNC: 137 MMOL/L (ref 136–145)
WBC # BLD AUTO: 8.8 K/UL (ref 4.8–10.8)

## 2025-05-20 PROCEDURE — 6370000000 HC RX 637 (ALT 250 FOR IP)

## 2025-05-20 PROCEDURE — 99232 SBSQ HOSP IP/OBS MODERATE 35: CPT | Performed by: PSYCHIATRY & NEUROLOGY

## 2025-05-20 PROCEDURE — 94760 N-INVAS EAR/PLS OXIMETRY 1: CPT

## 2025-05-20 PROCEDURE — 82962 GLUCOSE BLOOD TEST: CPT

## 2025-05-20 PROCEDURE — 6370000000 HC RX 637 (ALT 250 FOR IP): Performed by: ORTHOPAEDIC SURGERY

## 2025-05-20 PROCEDURE — 6370000000 HC RX 637 (ALT 250 FOR IP): Performed by: PSYCHIATRY & NEUROLOGY

## 2025-05-20 PROCEDURE — 80048 BASIC METABOLIC PNL TOTAL CA: CPT

## 2025-05-20 PROCEDURE — 6370000000 HC RX 637 (ALT 250 FOR IP): Performed by: STUDENT IN AN ORGANIZED HEALTH CARE EDUCATION/TRAINING PROGRAM

## 2025-05-20 PROCEDURE — 36415 COLL VENOUS BLD VENIPUNCTURE: CPT

## 2025-05-20 PROCEDURE — 2500000003 HC RX 250 WO HCPCS: Performed by: STUDENT IN AN ORGANIZED HEALTH CARE EDUCATION/TRAINING PROGRAM

## 2025-05-20 PROCEDURE — 85014 HEMATOCRIT: CPT

## 2025-05-20 PROCEDURE — 85025 COMPLETE CBC W/AUTO DIFF WBC: CPT

## 2025-05-20 PROCEDURE — 94150 VITAL CAPACITY TEST: CPT

## 2025-05-20 PROCEDURE — 85018 HEMOGLOBIN: CPT

## 2025-05-20 PROCEDURE — 6370000000 HC RX 637 (ALT 250 FOR IP): Performed by: HOSPITALIST

## 2025-05-20 RX ORDER — POLYETHYLENE GLYCOL 3350 17 G/17G
17 POWDER, FOR SOLUTION ORAL DAILY
Qty: 30 PACKET | Refills: 0 | Status: SHIPPED | OUTPATIENT
Start: 2025-05-21 | End: 2025-06-20

## 2025-05-20 RX ORDER — DIPHENHYDRAMINE HCL 25 MG
25 TABLET ORAL
Status: COMPLETED | OUTPATIENT
Start: 2025-05-20 | End: 2025-05-20

## 2025-05-20 RX ORDER — SENNA AND DOCUSATE SODIUM 50; 8.6 MG/1; MG/1
1 TABLET, FILM COATED ORAL 2 TIMES DAILY
Qty: 60 TABLET | Refills: 0 | Status: SHIPPED | OUTPATIENT
Start: 2025-05-20

## 2025-05-20 RX ORDER — LACTULOSE 10 G/15ML
30 SOLUTION ORAL 2 TIMES DAILY
Status: DISCONTINUED | OUTPATIENT
Start: 2025-05-20 | End: 2025-05-20 | Stop reason: HOSPADM

## 2025-05-20 RX ORDER — OXYCODONE HYDROCHLORIDE 5 MG/1
5 TABLET ORAL EVERY 4 HOURS PRN
Qty: 18 TABLET | Refills: 0 | Status: SHIPPED | OUTPATIENT
Start: 2025-05-20 | End: 2025-05-23

## 2025-05-20 RX ORDER — LACTULOSE 10 G/15ML
30 SOLUTION ORAL 2 TIMES DAILY
Qty: 2700 ML | Refills: 0 | Status: SHIPPED | OUTPATIENT
Start: 2025-05-20

## 2025-05-20 RX ORDER — DIPHENHYDRAMINE HCL 25 MG
25 TABLET ORAL EVERY 6 HOURS PRN
Status: DISCONTINUED | OUTPATIENT
Start: 2025-05-20 | End: 2025-05-20 | Stop reason: HOSPADM

## 2025-05-20 RX ORDER — FERROUS SULFATE 325(65) MG
325 TABLET, DELAYED RELEASE (ENTERIC COATED) ORAL
Qty: 30 TABLET | Refills: 1 | Status: SHIPPED | OUTPATIENT
Start: 2025-05-20

## 2025-05-20 RX ADMIN — INSULIN GLARGINE 15 UNITS: 100 INJECTION, SOLUTION SUBCUTANEOUS at 08:05

## 2025-05-20 RX ADMIN — CELECOXIB 200 MG: 200 CAPSULE ORAL at 08:05

## 2025-05-20 RX ADMIN — PANTOPRAZOLE SODIUM 40 MG: 40 TABLET, DELAYED RELEASE ORAL at 06:00

## 2025-05-20 RX ADMIN — INSULIN LISPRO 2 UNITS: 100 INJECTION, SOLUTION INTRAVENOUS; SUBCUTANEOUS at 08:05

## 2025-05-20 RX ADMIN — AMLODIPINE BESYLATE 5 MG: 5 TABLET ORAL at 08:05

## 2025-05-20 RX ADMIN — ROSUVASTATIN CALCIUM 20 MG: 20 TABLET, FILM COATED ORAL at 08:05

## 2025-05-20 RX ADMIN — ASPIRIN 81 MG: 81 TABLET, COATED ORAL at 08:05

## 2025-05-20 RX ADMIN — DIPHENHYDRAMINE HCL 25 MG: 25 TABLET, COATED ORAL at 03:20

## 2025-05-20 RX ADMIN — INSULIN LISPRO 2 UNITS: 100 INJECTION, SOLUTION INTRAVENOUS; SUBCUTANEOUS at 12:21

## 2025-05-20 RX ADMIN — LEVETIRACETAM 500 MG: 500 TABLET, FILM COATED ORAL at 08:05

## 2025-05-20 RX ADMIN — WATER: 100 IRRIGANT IRRIGATION at 10:00

## 2025-05-20 RX ADMIN — SENNOSIDES AND DOCUSATE SODIUM 1 TABLET: 50; 8.6 TABLET ORAL at 08:05

## 2025-05-20 NOTE — DISCHARGE SUMMARY
Coloration: Skin is pale.   Neurological:      General: No focal deficit present.      Mental Status: She is alert and oriented to person, place, and time.      Cranial Nerves: No cranial nerve deficit.      Motor: Weakness (generalized) present.   Psychiatric:         Mood and Affect: Mood normal.         Behavior: Behavior normal.         Discharge Medications:         Medication List        START taking these medications      lactulose 10 GM/15ML solution  Commonly known as: CHRONULAC  Take 45 mLs by mouth 2 times daily     metFORMIN 500 MG tablet  Commonly known as: GLUCOPHAGE  Take 1 tablet by mouth 2 times daily (with meals)     oxyCODONE 5 MG immediate release tablet  Commonly known as: ROXICODONE  Take 1 tablet by mouth every 4 hours as needed for Pain for up to 3 days. Max Daily Amount: 30 mg     polyethylene glycol 17 g packet  Commonly known as: GLYCOLAX  Take 1 packet by mouth daily  Start taking on: May 21, 2025     sennosides-docusate sodium 8.6-50 MG tablet  Commonly known as: SENOKOT-S  Take 1 tablet by mouth 2 times daily            CONTINUE taking these medications      amLODIPine 5 MG tablet  Commonly known as: NORVASC     aspirin 81 MG EC tablet     azelastine 137 MCG/SPRAY nasal spray  Commonly known as: ASTEPRO     celecoxib 200 MG capsule  Commonly known as: CELEBREX     diclofenac 1.3 % Ptch patch  Commonly known as: FLECTOR     Eliquis 2.5 MG Tabs tablet  Generic drug: apixaban  Take 1 tablet by mouth 2 times daily     fluticasone 50 MCG/ACT nasal spray  Commonly known as: FLONASE     hydroCHLOROthiazide 12.5 MG tablet     levETIRAcetam 500 MG tablet  Commonly known as: KEPPRA  Take 1 tablet by mouth 2 times daily     * OCUVITE PRESERVISION PO     * ICAPS AREDS 2 PO     omeprazole 20 MG delayed release capsule  Commonly known as: PRILOSEC     OneTouch Ultra strip  Generic drug: blood glucose test strips     rosuvastatin 20 MG tablet  Commonly known as: CRESTOR           * This list has 2

## 2025-05-20 NOTE — CARE COORDINATION
2nd IMM Letter given to patient/son. Reviewed, discussed, answered questions, and signed by patient's son. Declined to stay 4 hours prior to discharge. Signed copy placed in patient's chart.     05/20/25 1414   IMM Letter   IMM Letter given to Patient/Family/Significant other/Guardian/POA/by: given to patient/son by EMIL RESENDIZ RN BSN    IMM Letter date given: 05/20/25   IMM Letter time given: 1405     Electronically signed by Emil Resendiz RN, BSN on 5/20/2025 at 2:15 PM

## 2025-05-20 NOTE — CARE COORDINATION
Pre-cert has cleared for Pt to DC to [facility] on Tuesday May 20th if medically cleared. Pt will need an updated BM before DC.    [facility]  [phone] P  [phone] E-fax  Electronically signed by Tim Payne on 5/20/2025 at 7:58 AM

## 2025-05-20 NOTE — DISCHARGE INSTR - ACTIVITY
NWB to LLE x 12 weeks  - keep knee immobilizer while ambulating for 3 weeks, can remove while resting in bed   - LUE splint to remain in place for 3 weeks; re-evaluate for surgical intervention at that time  -

## 2025-05-20 NOTE — DISCHARGE INSTR - DIET
Good nutrition is important when healing from an illness, injury, or surgery.  Follow any nutrition recommendations given to you during your hospital stay.   If you were given an oral nutrition supplement while in the hospital, continue to take this supplement at home.  You can take it with meals, in-between meals, and/or before bedtime. These supplements can be purchased at most local grocery stores, pharmacies, and chain [employer]-stores.   If you have any questions about your diet or nutrition, call the hospital and ask for the dietitian.  ADULT DIET; Regular; 4 carb choices (60 gm/meal); Low Fat/Low Cholesterol/High Fiber/2 gm Na        ADULT ORAL NUTRITION SUPPLEMENT; Breakfast, Lunch, Dinner; Diabetic Oral Supplement

## 2025-05-20 NOTE — PROGRESS NOTES
05/18/25 1100   Subjective   Subjective \" Lets just do wht we need to.  \"  Patient agrees to sit EOB   Pain Some pain with  movement   patient states  \" not that bad \"   Cognition   Overall Cognitive Status WFL   Orientation   Overall Orientation Status WFL   Vitals   O2 Device None (Room air)   Bed Mobility Training   Bed Mobility Training Yes   Supine to Sit Substantial/Maximal assistance   Sit to Supine Substantial/Maximal assistance   Scooting 2 Person assistance;Dependent/Total  (Up in bed in trendelenberg)   Balance   Sitting Impaired  (Sat EOB X 15-20 minutes  MIN/MOD assist.)   Patient Education   Education Given To Patient;Family   Education Provided Role of Therapy;Plan of Care;Mobility Training;Fall Prevention Strategies;Family Education   Education Provided Comments Use of call light, staff A, NWB LUE/LLE, frequent repositioning   Education Method Demonstration;Verbal   Education Outcome Verbalized understanding;Demonstrated understanding;Continued education needed   Other Specialty Interventions   Other Treatments/Modalities BTB positioned for comfort call light all needs in reach Bed alarm set.   Assessment   Activity Tolerance Patient tolerated treatment well   PT Plan of Care   Sunday X       Electronically signed by Uvaldo Wilson PTA on 5/18/2025 at 12:09 PM     
  [facility]      Progress Note    Patient:  Michelle Al  YOB: 1945  Date of Service: 5/16/2025  MRN: [id number]   Acct: [id number]   Primary Care Physician: Gonzalo Rodriguez MD  Advance Directive: Full Code  Admit Date: 5/15/2025       Hospital Day: 1    Portions of this note have been copied forward, however, updated to reflect the most current clinical status of this patient.     CHIEF COMPLAINT Fall    SUBJECTIVE:  Ms. Al was resting in bed this morning. Reports left arm and left lower extremity pain. Reports intermittent episodes of vertigo. Reports nausea at times. Denies SOB or chest pain.       CUMULATIVE HOSPITAL COURSE:   The patient is a 80-year-old female with past medical history of seizures, hypertension, GERD who presented to [facility] ED for evaluation following a fall at home.  Stated she has had problems with dizziness at home and is being workup outpatient for vertigo. Became dizzy on day of admission and fell and landed on her left side hitting her face, arm and leg. Did have abrasion to her left forehead. Denied fever, chills, nausea, vomiting, abdominal pain, shortness of breath, or chest pain. Workup in ED revealed XR left humerus with proximal and mid diaphysis commuted fractures; XR left femur with commuted fracture distal femur just above the femoral prosthesis left knee arthroplasty; CXR with no acute cardiopulmonary disease; CT cervical spine with no acute fracture or traumatic malalignment; CT head with no acute intracranial abnormality; WBC 9.9, H&H 10.8/32.9, CMP unremarkable. Patient was admitted to hospital medicine for further evaluation with orthopedic surgery consultation. Orthopedic surgery suggested ORIF left femur with Dr. Patrice cheatham, sling to ARTURO HARP.       Review of Systems   Constitutional:  Negative for chills, diaphoresis, fatigue and fever.   HENT:  Negative for congestion, ear pain, sinus pain, sore throat and trouble swallowing.  
  [facility]    Progress Note    Patient:  Michelle Al  YOB: 1945  Date of Service: 5/18/2025  MRN: [id number]   Acct: [id number]   Primary Care Physician: Gonzalo Rodriguez MD  Advance Directive: Full Code  Admit Date: 5/15/2025       Hospital Day: 3    Portions of this note have been copied forward, however, updated to reflect the most current clinical status of this patient.     CHIEF COMPLAINT: Fall, dizziness    SUBJECTIVE: Lying in bed, pain is under control, no new complaints    CUMULATIVE HOSPITAL COURSE:     This patient is a 80-year-old female with past medical history of seizures, hypertension, GERD, CVA who presented to the ED for evaluation post fall at home.  Patient reports that she has been having dizziness for the past 2 months.  She is currently being worked up outpatient for vertigo and has referral to ENT.  Patient became dizzy on day of admission and fell causing her to land on her left side.  ED workup revealed x-ray left humerus with proximal and mid diaphysis comminuted fractures, x-ray left femur with comminuted fracture of distal femur just above the femoral prosthesis left knee arthroplasty, chest x-ray with no acute findings, CT head with no acute intracranial abnormality, CT cervical spine with no acute fracture or traumatic misalignment.  Patient was admitted to hospitalist service with consult to orthopedic surgery and neurology.  Patient underwent ORIF left femur on 5/16.  Left upper extremity currently in sling/immobilizer.  Neurology was consulted for dizziness.  MRI brain, MRA head and neck ordered by neurology.  MRI brain revealed no acute intracranial abnormality, moderate microvascular disease, remote ischemic changes in the right occipital and right temporal regions.   MRA head and neck unremarkable.  PT/OT following.  Hemoglobin 6.8 on 5/17, likely postoperative.  She was given 1 unit PRBCs 5/17.  Hemoglobin 7.1 on AM labs.  H&H q 6 hours continues.  
  [facility]      Patient:  Michelle Al  YOB: 1945  Date of Service: 5/19/2025  MRN: [id number]   Acct: [id number]   Primary Care Physician: Gonzalo Rodriguez MD  Advance Directive: Full Code  Admit Date: 5/15/2025       Hospital Day: 4  Portions of this note have been copied forward, however, changed to reflect the most current clinical status of this patient.    CHIEF COMPLAINT fall, dizziness     SUBJECTIVE:  no issues overnight, states pain has been under control    Cumulative hospital course   80-year-old female with seizures, HTN, GERD, CVA, with complaints of fall.  Patient states that she has had ongoing dizziness upon position change for the past several months and has been referred to outpatient ENT.  Stated on day of admission she became dizzy, fell landing on her left side.  Workup in ER left humerus x-ray with proximal and mid diaphysis communicated fractures, XR left femur with commuted fracture of distal femur just above the femoral prosthetic left knee arthroplasty, CXR no acute cardiopulmonary process, CT head no acute intracranial abnormality, CT cervical spine no acute fracture or malalignment.  Patient admitted to hospitalist service with consultation to orthopedic surgery and neurology.  Underwent ORIF left femur on 5/16 and left upper extremity currently in sling/immobilizer.  Obtained MRI brain no acute intracranial abnormality, moderate microvascular disease, remote ischemic changes right occipital and right temporal region, MRA head/neck unremarkable. Likely BPPC triggered by head movement. PT for vestibular exercises when able. PT/OT following has been limited due to fear/pain.   following with rehab referral placed to [facility].  Postop hemoglobin 6.8 transfused 1 PRBC.  Iron studies obtained Venofer ordered.  Objective:   VITALS:  BP (!) 125/55   Pulse 80   Temp 98.1 °F (36.7 °C) (Temporal)   Resp 18   Ht 1.626 m (5' 4\")   Wt 92.9 kg (204 
  Physician Progress Note      PATIENT:               PEGGY BAEZA  CSN #:                  [id number]  :                       1945  ADMIT DATE:       5/15/2025 4:33 PM  DISCH DATE:  RESPONDING  PROVIDER #:        ZACK PELAYO          QUERY TEXT:    Anemia is documented in the medical record Internal Medicine Progress Notes by   Tata Joy APRN - CNP at 2025. Please specify the type:    The clinical indicators include:  This is a 80 y.o. female with GERD, HTN, seizures, vertigo comes to ED for   evaluation following a fall at home.    - \" Anemia -Likely postoperative. Hemoglobin 7.1 on AM labs. 1 unit PRBCs   given . Most recent Hgb 6.4, Hct 19.4-- transfuse 2 units PRBCs today.   Denies abnormal bleeding. \" (from IM Progress Notes by Tata Joy APRN - CNP at 2025)    - \" Status Post left supracondylar femur ORIF, closed reduction of left   proximal humerus left humerus fracture. \" (from Orthopedics Progress Notes by   Krishna Ibrahim MD at 2025)    On 05/15 Hb- 10.8, Hct- 32.9  On  Hb- 9.1, Hct- 28.6  On  Hb- 6.8, Hct- 20.9  On  Hb- 6.4, Hct- 19.4    S/p 1 unit PRBC, Serial labs      Thank Heaven dumont S CDS  Options provided:  -- Related to postoperative blood loss  -- Other - I will add my own diagnosis  -- Disagree - Not applicable / Not valid  -- Disagree - Clinically unable to determine / Unknown  -- Refer to Clinical Documentation Reviewer    PROVIDER RESPONSE TEXT:    The patient's anemia is related to postoperative blood loss.    Query created by: Heaven Pelaez on 2025 7:06 AM      Electronically signed by:  ZACK PELAYO 2025 10:22 AM          
  Physician Progress Note      PATIENT:               PEGGY BAEZA  CSN #:                  [id number]  :                       1945  ADMIT DATE:       5/15/2025 4:33 PM  DISCH DATE:  RESPONDING  PROVIDER #:        Krishna Ibrahim MD          QUERY TEXT:    Please clarify whether the left supracondylar femur fracture documented   Orthopedic Progress Notes by Krishna Ibrahim MD at 2025 is related to a   traumatic or non-traumatic cause:    The clinical indicators include:  This is a 80 y.o. female with GERD, HTN, seizures, vertigo comes to ED for   evaluation following a fall at home.    - \" Closed comminuted intra-articular fracture of distal femur, left, initial   encounter. Pain control: oxycodone PO/dilaudid IV moderate/severe scales. \"   (from H&P by Stella Cintrno APRN - CNP at 5/15/2025)    - \" Closed displaced comminuted fracture of shaft of left femur. Left Femur   Open Reduction Internal Fixation \" (from OP note dated  by Krishna Ibrahim MD)    - \" Status Post left supracondylar femur ORIF, closed reduction of left   proximal humerus left humerus fracture. \" (from Orthopedics Progress Notes by   Krishna Ibrahim MD at 2025)    - Comminuted fracture distal femur just above the femoral prosthesis of the   left knee arthroplasty (from XR Femur Left 5/15)  - Generalized osteopenia. Left humerus proximal and middiaphysis comminuted   fracture. (from XR Humerus Left 5/15)    s/p left supracondylar femur ORIF, Fentanyl inj, XR Femur, XR Humerus    Thank you,  Thanku Benigno SEARS CDS  Options provided:  -- Traumatic left supracondylar femur fracture  -- Pathological left supracondylar femur fracture due to osteopenia  -- Other - I will add my own diagnosis  -- Disagree - Not applicable / Not valid  -- Disagree - Clinically unable to determine / Unknown  -- Refer to Clinical Documentation Reviewer    PROVIDER RESPONSE TEXT:    The patient has a Traumatic left supracondylar femur 
 Occupational Therapy Initial Assessment  Date: 2025   Patient Name: Michelle Al  MRN: [id number]     : 1945    Date of Service: 2025    Discharge Recommendations:  24 hour supervision or assist, Patient would benefit from continued therapy after discharge       Assessment   Assessment: OT evaluation completed and tx initiated. Pt may benefit from continued skilled services to address functional deficits. Pt will likely progress with further tx. OT does not anticipate any environmental barriers to D/C to secondary location for assist with personal care and rehab to restore PLOF. Pt is resistant to most movement and is unable to be educated on why therapists need to move pt in the manner that we do. Pt will likely be unable to participate with therapy for extended periods of time.  REQUIRES OT FOLLOW-UP: Yes  Activity Tolerance  Activity Tolerance: Patient limited by pain;Treatment limited secondary to agitation              Patient Diagnosis(es): The primary encounter diagnosis was Fall from standing, initial encounter. Diagnoses of Vertigo, Abrasion of forehead, initial encounter, On continuous oral anticoagulation, Closed displaced spiral fracture of shaft of left humerus, initial encounter, and Closed fracture of distal end of left femur, unspecified fracture morphology, initial encounter ([facility]) were also pertinent to this visit.    Past Medical History:   Past Medical History:   Diagnosis Date    Chronic GERD     CVA (cerebral vascular accident) ([facility])     Diverticulitis     Hypertension     Seizures ([facility])         Past Surgical History:   Past Surgical History:   Procedure Laterality Date    APPENDECTOMY      BACK SURGERY      L T     GALLBLADDER SURGERY      TOTAL KNEE ARTHROPLASTY Bilateral     TUBAL LIGATION                Restrictions  Restrictions/Precautions  Restrictions/Precautions: Weight Bearing, Surgical Protocols, Fall Risk  Activity Level: Up with Assist  Required Braces or Orthoses?: 
4 Eyes Skin Assessment     NAME:  Michelle Al  YOB: 1945  MEDICAL RECORD NUMBER:  [id number]    The patient is being assessed for  Post-Op Surgical    I agree that at least one RN has performed a thorough Head to Toe Skin Assessment on the patient. ALL assessment sites listed below have been assessed.      Areas assessed by both nurses:    Head, Face, Ears, Shoulders, Back, Chest, Arms, Elbows, Hands, Sacrum. Buttock, Coccyx, Ischium, and Legs. Feet and Heels        Does the Patient have a Wound? No noted wound(s)       Walter Prevention initiated by RN: Yes  Wound Care Orders initiated by RN: No    Pressure Injury (Stage 3,4, Unstageable, DTI, NWPT, and Complex wounds) if present, place Wound referral order by RN under : No    New Ostomies, if present place, Ostomy referral order under : No     Nurse 1 eSignature: Electronically signed by Feli Kasper RN on 5/17/25 at 1:43 AM CDT    **SHARE this note so that the co-signing nurse can place an eSignature**    Nurse 2 eSignature: {Esignature:[id number]}  
Occupational Therapy  Pt receiving enema at bed level this am. Will continue to follow. Electronically signed by NIKHIL Gonzalez on 5/20/2025 at 10:48 AM    
Patient is currently refusing to turn over in bed to complete a 4 eyes assessment. Patient reports she has no skin issues and walks around frequently at home.  
Patient:   Michelle Al  MR#:    [id number]   Room:    22/522-01   YOB: 1945  Date of Progress Note: 5/18/2025  Time of Note                           8:45 AM  Consulting Physician:   Miguel Loredo M.D.  Attending Physician:  Felix Espinal MD     Chief complaint Vertigo    S:This 80 y.o. female  past medical history screen for stroke, hypertension and seizure is seen for evaluation of intermittent vertigo. This has been present for a few months. Typically these are triggered by head movements. It will last up to 30 seconds and resolved. There is associated nystagmus. She indicates she did have some vestibular treatments with improvement. Currently admitted after a fall due to vertigo with a left femur fracture which was repaired. She also has a left commuted proximal and mid diaphysis humeral flexor which is treated nonoperatively. She denies any associated focal weakness, facial droop, dysarthria or confusion with these episodes. CT of the head on admission with no acute changes noted. She has outpatient evaluation with ENT scheduled.  No new complaints this morning.  Feels well overall.    REVIEW OF SYSTEMS:  Constitutional: No fevers No chills  Neck:No stiffness  Respiratory: No shortness of breath  Cardiovascular: No chest pain No palpitations  Gastrointestinal: No abdominal pain    Genitourinary: No Dysuria  Neurological: No headache, no confusion    Past Medical History:      Diagnosis Date    Chronic GERD     CVA (cerebral vascular accident) ([facility])     Diverticulitis     Hypertension     Seizures ([facility])        Past Surgical History:      Procedure Laterality Date    APPENDECTOMY      BACK SURGERY      L T     GALLBLADDER SURGERY      TOTAL KNEE ARTHROPLASTY Bilateral     TUBAL LIGATION         Medications in Hospital:      Current Facility-Administered Medications:     aspirin EC tablet 81 mg, 81 mg, Oral, Daily, Miguel Loredo MD    oxyCODONE (ROXICODONE) immediate release tablet 5 mg, 5 
Patient:   Michelle Al  MR#:    [id number]   Room:    0522/522-01   YOB: 1945  Date of Progress Note: 5/20/2025  Time of Note                           6:55 AM  Consulting Physician:   Miguel Loredo M.D.  Attending Physician:  Felix Espinal MD     Chief complaint Vertigo    S:This 80 y.o. female  past medical history screen for stroke, hypertension and seizure is seen for evaluation of intermittent vertigo. This has been present for a few months. Typically these are triggered by head movements. It will last up to 30 seconds and resolved. There is associated nystagmus. She indicates she did have some vestibular treatments with improvement. Currently admitted after a fall due to vertigo with a left femur fracture which was repaired. She also has a left commuted proximal and mid diaphysis humeral flexor which is treated nonoperatively. She denies any associated focal weakness, facial droop, dysarthria or confusion with these episodes. CT of the head on admission with no acute changes noted. She has outpatient evaluation with ENT scheduled.  Status post blood transfusion.  Some itching in the back.      REVIEW OF SYSTEMS:  Constitutional: No fevers No chills  Neck:No stiffness  Respiratory: No shortness of breath  Cardiovascular: No chest pain No palpitations  Gastrointestinal: No abdominal pain    Genitourinary: No Dysuria  Neurological: No headache, no confusion    Past Medical History:      Diagnosis Date    Chronic GERD     CVA (cerebral vascular accident) ([facility])     Diverticulitis     Hypertension     Seizures ([facility])        Past Surgical History:      Procedure Laterality Date    APPENDECTOMY      BACK SURGERY      L T     FEMUR SURGERY Left 5/16/2025    LEFT FEMUR OPEN REDUCTION INTERNAL FIXATION performed by [facility]na [facility]brahim MD [facility]    GALLBLADDER SURGERY      TOTAL KNEE ARTHROPLASTY Bilateral     TUBAL LIGATION         Medications in Hospital:      Current Facility-Administered 
Physical Therapy    Name: Michelle Al  MRN: [id number]  Date of service: 5/19/2025 05/19/25 0745   Restrictions/Precautions   Restrictions/Precautions Weight Bearing;Surgical Protocols;Fall Risk   Lower Extremity Weight Bearing Restrictions   Left Lower Extremity Weight Bearing Non Weight Bearing   Upper Extremity Weight Bearing Restrictions   Left Upper Extremity Weight Bearing Non Weight Bearing   Required Braces or Orthoses   Left Lower Extremity Brace Knee Immobilizer   Left Upper Extremity Brace/Splint Sling  (brace)   Position Activity Restriction   Other Position/Activity Restrictions LUE rodriguez brace   General   General Comments Pt in bed, son present   Subjective   Subjective agreed to therapy but stated she cannot do much   Pain   Pre-Pain 6   Post-Pain 6   Pain Location Left;Arm;Shoulder;Leg   Pain Interventions   (nurse stated pt has had pain medicine)   Cognition   Cognition Comment FEARFUL, ANXIOUS   Observation/Palpation   Observation KI LLE, rodriguez brace, c pap, purewick   Bed mobility   Supine to Sit Substantial/Maximal assistance;2 Person assistance;Dependent/Total   Sit to Supine Substantial/Maximal assistance;2 Person assistance;Dependent/Total   Bed Mobility Comments pt not trying to assist, Sat EOB with LLE straight out, stating she could not rest it on the floor.   Transfers   Lateral Transfers   (attempted to scoot at EOB)   Comment Pt declined standing today, encouarged pt to participate when possible   Ambulation   WB Status NWB LUE/LLE   Ambulation   Comments unable at this time   Patient Goals    Patient Goals  going to try and get better   Short Term Goals   Time Frame for Short Term Goals 2 wks   Short Term Goal 1 supine to sit Min A   Short Term Goal 2 bed to drop arm chair with SB CGA, NWB LLE/LUE   Short Term Goal 3 sit to stand NWB LLE/LUE, using R rail CGA   Activity Tolerance   Activity Tolerance Patient limited by pain   Assessment   Assessment Pt is overall limited 
Physical Therapy    Name: Michelle Al  MRN: [id number]  Date of service: 5/20/2025    Nursing in room at this time states pt is getting and enema. Will check back later is pt is not discharged.      Electronically signed by Igor Muñoz PTA on 5/20/2025 at 10:41 AM     
Set up pts home cpap machine. States she isn't ready for bed and can place on herself.  
Subjective:     Post-Operative Day: 1 Status Post left supracondylar femur ORIF, closed reduction splinting of left humerus  Systemic or Specific Complaints:No Complaints  no nausea Pain 5    Objective:     Patient Vitals for the past 24 hrs:   BP Temp Temp src Pulse Resp SpO2   05/17/25 1139 (!) 124/54 -- -- -- -- --   05/17/25 1135 (!) 109/35 98.4 °F (36.9 °C) Temporal 95 18 93 %   05/17/25 0926 -- -- -- -- -- 92 %   05/17/25 0734 (!) 133/53 98.6 °F (37 °C) Temporal 88 17 91 %   05/17/25 0406 (!) 126/54 98.1 °F (36.7 °C) Temporal 80 18 92 %   05/17/25 0223 -- -- -- -- 18 --   05/17/25 0105 (!) 118/56 97.5 °F (36.4 °C) Temporal 69 18 95 %   05/17/25 0015 (!) 148/50 97.4 °F (36.3 °C) Temporal 75 18 95 %   05/16/25 2358 (!) 120/38 -- -- 73 15 95 %   05/16/25 2351 -- -- -- 77 -- --   05/16/25 2349 (!) 117/43 98.4 °F (36.9 °C) -- 77 19 96 %   05/16/25 2345 (!) 102/30 -- -- 79 14 97 %   05/16/25 2340 (!) 125/51 -- -- 77 16 94 %   05/16/25 2335 (!) 122/43 -- -- 83 16 92 %   05/16/25 2330 -- -- -- 91 16 (!) 89 %   05/16/25 2328 (!) 134/112 97.6 °F (36.4 °C) Temporal 90 20 93 %   05/16/25 1918 -- -- -- 86 18 92 %   05/16/25 1740 -- -- -- 92 21 93 %   05/16/25 1730 -- -- -- 82 19 92 %   05/16/25 1720 -- -- -- 87 17 91 %   05/16/25 1710 -- -- -- 88 18 93 %   05/16/25 1700 -- -- -- 92 19 92 %   05/16/25 1655 -- -- -- 86 15 93 %   05/16/25 1650 -- -- -- 88 21 92 %   05/16/25 1645 -- -- -- 91 21 93 %   05/16/25 1640 -- -- -- 88 18 92 %   05/16/25 1635 -- -- -- 93 15 96 %   05/16/25 1630 -- -- -- 88 21 94 %   05/16/25 1625 -- -- -- 90 17 92 %   05/16/25 1620 -- -- -- 91 20 95 %   05/16/25 1615 -- -- -- 92 13 94 %   05/16/25 1610 -- -- -- 92 13 95 %   05/16/25 1605 -- -- -- 88 17 94 %   05/16/25 1600 (!) 133/51 -- -- 91 16 93 %   05/16/25 1555 (!) 101/56 -- -- 91 21 93 %   05/16/25 1429 -- -- -- 80 17 96 %       General: alert, appears stated age, and cooperative   Exam: Incision clean, dry, and intact, no evidence of infection.  
Subjective:     Post-Operative Day: 3 Status Post left supracondylar femur ORIF, closed reduction splinting of left humerus   Systemic or Specific Complaints:No Complaints  no nausea Pain 4    Patient resting in bed this am. A&Ox3. She is able to sit up this am, which appears to be an improvement with vertigo. She has worked well with PT/OT and plans to DC to [facility] today.     Objective:     Patient Vitals for the past 24 hrs:   BP Temp Temp src Pulse Resp SpO2   05/20/25 0744 (!) 125/50 99 °F (37.2 °C) Temporal 86 18 93 %   05/20/25 0636 -- -- -- 82 20 95 %   05/20/25 0511 (!) 116/52 98.1 °F (36.7 °C) Temporal 86 18 96 %   05/20/25 0023 (!) 134/43 97.9 °F (36.6 °C) Temporal 83 18 98 %   05/19/25 1923 (!) 134/52 99.1 °F (37.3 °C) Temporal 89 18 96 %   05/19/25 1624 (!) 140/60 99 °F (37.2 °C) Temporal 80 17 96 %   05/19/25 1119 (!) 125/55 98.1 °F (36.7 °C) Temporal 80 18 94 %   05/19/25 1057 -- -- -- -- -- 95 %       General: alert, appears stated age, and cooperative   Exam: LLE- Exam normal, knee immobilizer in place. +2 palpable dorsalis pedis pulse; sensation to toes intact  LUE- splint in place; +2 palpable radial pulse; sensation to fingers intact   Neurovascular: Exam normal. Cap refill < 2 seconds in all extremities,      Data Review:  Recent Labs     05/19/25  2234 05/20/25  0507   HGB 7.3* 7.6*  7.3*     Recent Labs     05/20/25  0507      K 4.1   CREATININE 0.5     Recent Labs     05/20/25  0507   LABGLOM >90           Assessment:     Status Post left supracondylar femur ORIF, closed reduction splinting of left humerus . Doing well postoperatively.     Plan:       - NWB to LLE x 12 weeks  - keep knee immobilizer while ambulating for 3 weeks, can remove while resting in bed   - LUE splint to remain in place for 3 weeks; re-evaluate for surgical intervention at that time  - work towards placement at [facility] with backup option [place] [facility]  - PT/OT daily   - aspirin BID x6 weeks   - 
Subjective:     Post-Operative Day: 3 Status Post left supracondylar femur ORIF, closed reduction splinting of left humerus   Systemic or Specific Complaints:No Complaints  no nausea Pain 4    Patient resting in bed this am. A&Ox3. She reports wokring with PT/OT twice post op. She was able to sit edge of bed for 15 minutes. We dsicussed orthopedic needs and patient.     Objective:     Patient Vitals for the past 24 hrs:   BP Temp Temp src Pulse Resp SpO2   05/19/25 0720 (!) 135/52 97.3 °F (36.3 °C) Temporal 81 16 94 %   05/19/25 0454 (!) 135/51 97.9 °F (36.6 °C) Temporal 80 18 98 %   05/19/25 0031 (!) 125/56 98.4 °F (36.9 °C) Temporal 79 18 96 %   05/18/25 1938 (!) 124/54 -- -- -- -- --   05/18/25 1933 (!) 112/40 98.8 °F (37.1 °C) Temporal 92 18 97 %   05/18/25 1355 (!) 120/43 98 °F (36.7 °C) Temporal 88 20 97 %   05/18/25 1328 (!) 132/43 98.3 °F (36.8 °C) Temporal 89 18 96 %   05/18/25 1035 -- -- -- -- -- 94 %       General: alert, appears stated age, and cooperative   Exam: LLE- Exam normal, knee immobilizer in place. +2 palpable dorsalis pedis pulse; sensation to toes intact  LUE- splint in place; +2 palpable radial pulse; sensation to fingers intact   Neurovascular: Exam normal. Cap refill < 2 seconds in all extremities,      Data Review:  Recent Labs     05/18/25  2301 05/19/25  0448   HGB 7.3* 7.6*     Recent Labs     05/19/25 0448      K 3.9   CREATININE 0.5     Recent Labs     05/19/25 0448   LABGLOM >90           Assessment:     Status Post left supracondylar femur ORIF, closed reduction splinting of left humerus . Doing well postoperatively.     Plan:       - NWB to LLE x 12 weeks  - keep knee immobilizer while ambulating for 3 weeks, can remove while resting in bed   - LLE splint to remain in place for 3 weeks; re-evaluate for surgical intervention at that time  - work towards placement at [employer] with backup option [place] [facility]  - PT/OT daily   - aspirin BID x6 weeks   - Will schedule 
she will not be able to return home.  Treatment Diagnosis: impaired gait and mobility  Therapy Prognosis: Fair;Guarded  Decision Making: Medium Complexity  Barriers to Learning: fear, pain  Requires PT Follow-Up: Yes  Activity Tolerance  Activity Tolerance: Patient limited by fatigue;Patient limited by pain;Treatment limited secondary to decreased cognition    Plan  Physical Therapy Plan  General Plan: 6-7 times per week  Therapy Duration: 2 Weeks  Current Treatment Recommendations: Strengthening, ROM, Balance training, Functional mobility training, Transfer training, Patient/Caregiver education & training, Safety education & training, Positioning, Equipment evaluation, education, & procurement, Therapeutic activities (BPPV ex)  Safety Devices  Type of Devices: Gait belt, Heels elevated for pressure relief, Patient at risk for falls, Nurse notified, Call light within reach, Bed alarm in place, Left in bed (daughter present, LUE/LLE elevated)    Restrictions  Restrictions/Precautions  Restrictions/Precautions: Weight Bearing, Surgical Protocols, Fall Risk  Activity Level: Up with Assist  Required Braces or Orthoses?: Yes  Lower Extremity Weight Bearing Restrictions  Left Lower Extremity Weight Bearing: Non Weight Bearing  Upper Extremity Weight Bearing Restrictions  Left Upper Extremity Weight Bearing: Non Weight Bearing  Required Braces or Orthoses  Left Lower Extremity Brace: Knee Immobilizer  Left Upper Extremity Brace/Splint: Sling  Position Activity Restriction  Other Position/Activity Restrictions: LUE rodriguez brace     Subjective  General  Chart Reviewed: Yes  Patient assessed for rehabilitation services?: Yes  Response To Previous Treatment: Not applicable  Family/Caregiver Present: Yes (daughter)  Referring Practitioner: Tata Joy, LUIS - LEANDRO, Dr. Loredo  Referral Date : 05/17/25  Diagnosis: Fall from standing,Vertigo, Abrasion of forehead, Closed d/p spiral fx of shaft of L humerus, Closed fx of 
consistent with small vessel disease. Stable encephalomalacia in the right occipital lobe, likely from previous ischemic event. Extra-axial spaces and basal cisterns: Normal. Ventricles: Normal size and morphology for age. Paranasal sinuses and mastoid air cells: Visualized portions of paranasal sinuses are clear.  Mastoid air cells are clear. Orbits: Normal visualized portions. Sella/Skull Base: Normal. Other: Scalp and visualized soft tissues are normal.  Calvarium is normal.      No acute intracranial abnormality.  All CT scans are performed using dose optimization techniques as appropriate to the performed exam and include at least one of the following: Automated exposure control, adjustment of the mA and/or kV according to size, and the use of iterative reconstruction technique.  ______________________________________ Electronically signed by: USMAN BAUTISTA M.D. Date:     05/15/2025 Time:    16:06     CT CERVICAL SPINE WO CONTRAST  Result Date: 5/15/2025  EXAMINATION: CT CERVICAL SPINE WITHOUT CONTRAST  HISTORY: Trauma.  TECHNIQUE: Computed tomography (CT) of the cervical spine was performed according to standard protocol without intravenous contrast.2-D coronal and sagittal reformatted images were obtained from the axial source images. Contrast Dose: None. CT Dose Reduction Techniques Performed: Yes.  COMPARISON: None.  FINDINGS:  Limited Head: Normal. Craniocervical Junction: Normal. Atlantoaxial Joint: Atlantodental interval is normal.  Dens is normal.  Lateral masses of C1 are normal in alignment. Alignment: No listhesis.  Mild levocurvature Post-Surgical Changes/Hardware: None. Bones: No acute fracture. No chronic compression deformity. Soft Tissues: No prevertebral soft tissue swelling. Limited Upper Chest: Normal. Degenerative Changes: Mild multilevel degenerative disc disease and moderate - severe multilevel facet arthropathy..      - No acute fracture or traumatic malalignment. - Multilevel cervical 
microvascular disease.No acute infarction, acute hemorrhage, mass lesion, or midline shift. Postsurgical changes both globes. Ventricles appropriate in size. Basilar  cisterns clear. Flow voids demonstrated in the major intracranial vasculature. Grossly normal pituitary and sella. Normal calvarial marrow signal. Paranasal sinuses clear.      Impression:     Impression:  No acute intracranial abnormality.  Moderate microvascular disease. Remote ischemic changes in the right occipital and right temporal regions.        ______________________________________  Electronically signed by: MAYRA ZUÑIGA D.O.  Date:     05/18/2025  Time:    07:28           RECORD REVIEW: Previous medical records, medications were reviewed at today's visit    IMPRESSION:   Intermittent vertigo most suggestive of BPPV (benign paroxysmal positional vertigo)-triggered by head movement     Initial examination  Awake, alert, oriented, fluent, appropriate  No nystagmus noted  Left arm in sling/status post left femur fracture repair     MRI of the brain/MRA of the head and neck for completeness-no acute changes noted/chronic ischemic change in the right occipital/temporal region     PT for vestibular exercises which is most effective management of BPPV     Hypertension-on meds monitor  Pain control-Celebrex/Roxicodone as needed  Diabetes-on insulin monitor blood sugar  History of seizure-on Keppra  GI-PPI/bowel regimen  Hyperlipidemia-on statin  History of stroke-aspirin/statin  Status post left femur fracture and repair     Supportive care    Disposition pending    Okay to DC from [facility] once medically cleared    Follow-up as needed    CALL WITH ANY QUESTIONS  [phone] CELL  Dr Miguel Loredo

## 2025-06-05 ENCOUNTER — OFFICE VISIT (OUTPATIENT)
Age: 80
End: 2025-06-05

## 2025-06-05 VITALS — BODY MASS INDEX: 34.83 KG/M2 | WEIGHT: 204 LBS | HEIGHT: 64 IN

## 2025-06-05 DIAGNOSIS — M25.512 LEFT SHOULDER PAIN, UNSPECIFIED CHRONICITY: ICD-10-CM

## 2025-06-05 DIAGNOSIS — M89.8X5 PAIN OF LEFT FEMUR: Primary | ICD-10-CM

## 2025-06-05 PROCEDURE — 99024 POSTOP FOLLOW-UP VISIT: CPT | Performed by: ORTHOPAEDIC SURGERY

## 2025-06-05 NOTE — PROGRESS NOTES
[facility]  [facility]  [address]  [facility] [id number] [phone]     Patient: Michelle Al   YOB: 1945   Date: 6/5/2025     Chief Complaint   Patient presents with    Post-Op Check     ORIF Left Femur/Humeral Shaft FX        History of Present Illness  This is a 80 y.o. female presents today 3 weeks status post periprosthetic femur fracture on the left knee as well as a humeral shaft fracture occurring 5/15/2025 she is here by herself she is living in the facility right now..    History of Present Illness  The patient presents for evaluation of her femur.    She is currently residing in a nursing home and was brought in by a  from the facility. She has been utilizing an immobilizer since her surgical procedure, which took place 3 weeks ago. She expresses a desire to have her brace adjusted due to discomfort, as it extends up to her arm. She also seeks advice on whether to maintain her scheduled appointment with Dr. Greene on 06/20/2025.       Past Medical History:   Diagnosis Date    Chronic GERD     CVA (cerebral vascular accident) (HCC)     Diverticulitis     Hypertension     Seizures (HCC)       Past Surgical History:   Procedure Laterality Date    APPENDECTOMY      BACK SURGERY      L T     FEMUR SURGERY Left 5/16/2025    LEFT FEMUR OPEN REDUCTION INTERNAL FIXATION performed by Krishna Ibrahim MD at [facility] OR    GALLBLADDER SURGERY      TOTAL KNEE ARTHROPLASTY Bilateral     TUBAL LIGATION        Social History     Socioeconomic History    Marital status:    Tobacco Use    Smoking status: Never     Passive exposure: Never    Smokeless tobacco: Never   Vaping Use    Vaping status: Never Used   Substance and Sexual Activity    Alcohol use: Never    Drug use: Never    Sexual activity: Not Currently     Social Drivers of Health     Food Insecurity: No Food Insecurity (5/18/2025)    Hunger Vital Sign

## 2025-07-01 ENCOUNTER — OFFICE VISIT (OUTPATIENT)
Age: 80
End: 2025-07-01

## 2025-07-01 VITALS — WEIGHT: 189 LBS | HEIGHT: 64 IN | BODY MASS INDEX: 32.27 KG/M2

## 2025-07-01 DIAGNOSIS — M25.512 LEFT SHOULDER PAIN, UNSPECIFIED CHRONICITY: ICD-10-CM

## 2025-07-01 DIAGNOSIS — S42.342D CLOSED DISPLACED SPIRAL FRACTURE OF SHAFT OF LEFT HUMERUS WITH ROUTINE HEALING, SUBSEQUENT ENCOUNTER: ICD-10-CM

## 2025-07-01 DIAGNOSIS — M89.8X5 PAIN OF LEFT FEMUR: Primary | ICD-10-CM

## 2025-07-01 DIAGNOSIS — S72.492A CLOSED COMMINUTED INTRA-ARTICULAR FRACTURE OF DISTAL FEMUR, LEFT, INITIAL ENCOUNTER (HCC): ICD-10-CM

## 2025-07-01 PROBLEM — S42.302A CLOSED FRACTURE OF SHAFT OF LEFT HUMERUS: Status: ACTIVE | Noted: 2025-07-01

## 2025-07-01 PROCEDURE — 99024 POSTOP FOLLOW-UP VISIT: CPT | Performed by: ORTHOPAEDIC SURGERY

## 2025-07-01 NOTE — PROGRESS NOTES
Complaint    Ht 1.626 m (5' 4\")   Wt 85.7 kg (189 lb)   BMI 32.44 kg/m²      Physical Exam   Physical Examination:  General: The patient is a well nourished 80 y.o. female who is alert and oriented x3 in no distress. The patient is cooperative during the exam.  Psychological: Is a pleasant female, good mood and affect, answers questions appropriately.  Head and Neck: Normocephalic, Atraumatic. Neck supple, no evidence of masses.   Abdomen: Soft, nontender, nondistended.  Eyes: Perrla. Extraocular movements intact.   Respiratory: Rate and effort within normal limits.       Left upper extremity: Bill splint removed I was able to passively range her shoulder with mild mild to moderate pain she is neurovascular tact for the median ulnar and radial nerves.\    Left knee: Incisions well-healed laterally full extension flexes easily to about 70 degrees.      Imaging:    XR FEMUR LEFT (MIN 2 VIEWS)  Result Date: 7/1/2025  725: 7/1/2025: 2 views left femur show a well aligned supracondylar periprosthetic right which spans from her knee replacement to her hip.  There is maintained alignment from immediate postoperative fixation 7/1/2025: 2 views of the left humerus AP and lateral show a comminuted oblique spiral type fractures of the midshaft humerus with some early callus formation.     XR HUMERUS LEFT (MIN 2 VIEWS)  Result Date: 7/1/2025  725: 7/1/2025: 2 views left femur show a well aligned supracondylar periprosthetic right which spans from her knee replacement to her hip.  There is maintained alignment from immediate postoperative fixation 7/1/2025: 2 views of the left humerus AP and lateral show a comminuted oblique spiral type fractures of the midshaft humerus with some early callus formation.          Diagnosis:    ICD-10-CM    1. Pain of left femur  M89.8X5 XR FEMUR LEFT (MIN 2 VIEWS)      2. Left shoulder pain, unspecified chronicity  M25.512 XR HUMERUS LEFT (MIN 2 VIEWS)      3. Closed comminuted

## 2025-07-09 ENCOUNTER — TELEPHONE (OUTPATIENT)
Age: 80
End: 2025-07-09

## 2025-07-09 NOTE — TELEPHONE ENCOUNTER
Called spoke to Christo and advised him pt does not need to be seen tomorrow and I cancelled this apt. Pt has apt scheduled 8/18 pt was just seen 7/1. Caller and pt both verbalized understanding.

## 2025-07-24 ENCOUNTER — TELEPHONE (OUTPATIENT)
Age: 80
End: 2025-07-24

## 2025-07-24 NOTE — TELEPHONE ENCOUNTER
Patient called stating the facility she is staying at needs a weight bearing restriction for her care. I called [facility] and spoke with Erick. I obtained and sent over letter stating Dr. Ibrahim's orders in his last note and advised if they needed anything else to let us know. The request was faxed. Erick verbalized understanding.

## 2025-07-28 ENCOUNTER — TELEPHONE (OUTPATIENT)
Age: 80
End: 2025-07-28

## 2025-07-28 NOTE — TELEPHONE ENCOUNTER
Called spoke to pt who said her insurance has stopped paying for nursing home stay because she is still non weigh baring with no improvement.

## 2025-07-28 NOTE — TELEPHONE ENCOUNTER
Patient wants to speak to clinic regarding getting seen by Patrice. She states the nursing home she is at is charging her $400 a day.

## 2025-07-28 NOTE — TELEPHONE ENCOUNTER
Called spoke to pt and advised her per Dr Foy 7/1 office note she is to be non weight baring for 6 weeks and she would need to speak to her insurance company about this. Pt verbalized understanding.

## 2025-08-11 ENCOUNTER — TELEPHONE (OUTPATIENT)
Dept: NEUROLOGY | Age: 80
End: 2025-08-11

## 2025-08-18 ENCOUNTER — TELEPHONE (OUTPATIENT)
Age: 80
End: 2025-08-18

## 2025-08-18 ENCOUNTER — OFFICE VISIT (OUTPATIENT)
Age: 80
End: 2025-08-18
Payer: MEDICARE

## 2025-08-18 VITALS — HEIGHT: 64 IN | BODY MASS INDEX: 32.27 KG/M2 | WEIGHT: 189 LBS

## 2025-08-18 DIAGNOSIS — M89.8X5 PAIN OF LEFT FEMUR: Primary | ICD-10-CM

## 2025-08-18 DIAGNOSIS — S42.342D CLOSED DISPLACED SPIRAL FRACTURE OF SHAFT OF LEFT HUMERUS WITH ROUTINE HEALING, SUBSEQUENT ENCOUNTER: ICD-10-CM

## 2025-08-18 PROCEDURE — 1160F RVW MEDS BY RX/DR IN RCRD: CPT | Performed by: PHYSICIAN ASSISTANT

## 2025-08-18 PROCEDURE — 1159F MED LIST DOCD IN RCRD: CPT | Performed by: PHYSICIAN ASSISTANT

## 2025-08-18 PROCEDURE — 99212 OFFICE O/P EST SF 10 MIN: CPT | Performed by: PHYSICIAN ASSISTANT

## 2025-08-18 PROCEDURE — 1123F ACP DISCUSS/DSCN MKR DOCD: CPT | Performed by: PHYSICIAN ASSISTANT

## 2025-08-18 RX ORDER — FLUCONAZOLE 150 MG/1
TABLET ORAL
COMMUNITY
Start: 2025-06-30

## 2025-08-18 RX ORDER — LOSARTAN POTASSIUM 50 MG/1
TABLET ORAL
COMMUNITY
Start: 2025-07-28

## 2025-08-19 ENCOUNTER — TELEPHONE (OUTPATIENT)
Age: 80
End: 2025-08-19

## (undated) DEVICE — SUTURE VICRYL + 3-0 L27IN ABSRB UD PS-2 L19MM 3/8 CIR PRIM VCP427H

## (undated) DEVICE — SYSTEM SKIN CLSR 22CM DERMBND PRINEO

## (undated) DEVICE — IMPLANTABLE DEVICE
Type: IMPLANTABLE DEVICE | Site: FEMUR | Status: NON-FUNCTIONAL
Removed: 2025-05-16

## (undated) DEVICE — GUIDEWIRE ORTH DIA2.5MM LOK SMOOTH DRL TIP FLX THRD FOR

## (undated) DEVICE — DRAPE,ORTHOMAX ,HIP,W/POUCHES: Brand: MEDLINE

## (undated) DEVICE — GLOVE SURG SZ 8 L12IN FNGR THK79MIL GRN LTX FREE

## (undated) DEVICE — SPLINT ORTH 22IN KNEE BASIC

## (undated) DEVICE — DRAPE C ARM W42XL120IN W POLY STRP W OUT LENS

## (undated) DEVICE — Device

## (undated) DEVICE — SUTURE VICRYL + SZ 1 L18IN ABSRB UD L36MM CT-1 1/2 CIR VCP841D

## (undated) DEVICE — GLOVE SURG SZ 75 CRM LTX FREE POLYISOPRENE POLYMER BEAD ANTI

## (undated) DEVICE — TOWEL,OR,DSP,ST,BLUE,DLX,4/PK,20PK/CS: Brand: MEDLINE

## (undated) DEVICE — SUTURE VICRYL + SZ 0 L27IN ABSRB UD CT-1 L36MM 1/2 CIR TAPR VCP260H

## (undated) DEVICE — DEVICE PUL REDUC EXT FIX FOR 43MM PERC DRL GUID NUT

## (undated) DEVICE — C-ARMOR C-ARM EQUIPMENT COVERS CLEAR STERILE UNIVERSAL FIT 12 PER CASE: Brand: C-ARMOR

## (undated) DEVICE — UNDERGLOVE SURG SZ 8 FNGR THK0.21MIL GRN LTX BEAD CUF

## (undated) DEVICE — SHEET,DRAPE,53X77,STERILE: Brand: MEDLINE

## (undated) DEVICE — GLOVE SURG SZ 85 CRM LTX FREE POLYISOPRENE POLYMER BEAD ANTI

## (undated) DEVICE — OPTIFOAM GENTLE SA, POSTOP, 4X8: Brand: MEDLINE

## (undated) DEVICE — BIT DRL L300MM DIA4.3MM QUIK CPL PERC CALIB W/O STP REUSE

## (undated) DEVICE — OPTIFOAM GENTLE SA, POSTOP, 4X12: Brand: MEDLINE

## (undated) DEVICE — SUTURE VICRYL + SZ 2-0 L36IN ABSRB UD L36MM CT-1 1/2 CIR VCP945H

## (undated) DEVICE — BANDAGE COMPR W6INXL10YD ST M E WHITE/BEIGE

## (undated) DEVICE — GLOVE SURG SZ 85 L12IN FNGR THK79MIL GRN LTX FREE

## (undated) DEVICE — SUTURE VICRYL ABSRB BRAID COAT UD CP NO 2 27IN  J195H